# Patient Record
Sex: FEMALE | Race: WHITE | Employment: OTHER | URBAN - METROPOLITAN AREA
[De-identification: names, ages, dates, MRNs, and addresses within clinical notes are randomized per-mention and may not be internally consistent; named-entity substitution may affect disease eponyms.]

---

## 2017-01-16 ENCOUNTER — TELEPHONE (OUTPATIENT)
Dept: RHEUMATOLOGY | Facility: CLINIC | Age: 57
End: 2017-01-16

## 2017-01-16 RX ORDER — OMEPRAZOLE 40 MG/1
CAPSULE, DELAYED RELEASE ORAL
Qty: 90 CAPSULE | Refills: 5 | Status: SHIPPED | OUTPATIENT
Start: 2017-01-16 | End: 2017-03-16

## 2017-01-16 NOTE — TELEPHONE ENCOUNTER
Pt states that she has pain in the buttocks area. Per pt the pain medication is not helping her at all. Pt would like to know what doctor recommends for the pain. Pt is requesting a call back in [address] [facility].

## 2017-01-16 NOTE — TELEPHONE ENCOUNTER
Attempted to contact pt via , Salvatore Eisenberg #[id number], not available at first number (W). Unable to leave message at home number. FYI-please see below.

## 2017-01-17 NOTE — TELEPHONE ENCOUNTER
Attempted to call pt.  Via  Erasmo Rothman - not available at home number and not able to leave message  Tried again to call work number - no answer

## 2017-02-07 RX ORDER — TRAMADOL HYDROCHLORIDE 50 MG/1
TABLET ORAL
Qty: 60 TABLET | Refills: 0 | OUTPATIENT
Start: 2017-02-07 | End: 2017-03-16

## 2017-02-09 RX ORDER — MELOXICAM 15 MG/1
TABLET ORAL
Qty: 30 TABLET | Refills: 1 | Status: SHIPPED | OUTPATIENT
Start: 2017-02-09 | End: 2017-04-04

## 2017-02-09 NOTE — TELEPHONE ENCOUNTER
Called [facility] pharmacy and left rx approval on voicemail as authorized by Dr. Fabi Villalobos for Tramadol 50mg # 60    TRAMADOL HCL 50 MG Oral Tab 60 tablet 0        Start: 2/7/2017       Sig:  TAKE 1 TABLET BY MOUTH TWICE A DAY AS NEEDED FOR PAIN       Class:  Phon

## 2017-02-10 RX ORDER — TRAMADOL HYDROCHLORIDE 50 MG/1
TABLET ORAL
Qty: 60 TABLET | OUTPATIENT
Start: 2017-02-10

## 2017-02-14 RX ORDER — OMEPRAZOLE 40 MG/1
CAPSULE, DELAYED RELEASE ORAL
Qty: 90 CAPSULE | Refills: 1 | Status: SHIPPED | OUTPATIENT
Start: 2017-02-14 | End: 2018-07-19

## 2017-03-06 ENCOUNTER — TELEPHONE (OUTPATIENT)
Dept: RHEUMATOLOGY | Facility: CLINIC | Age: 57
End: 2017-03-06

## 2017-03-06 NOTE — TELEPHONE ENCOUNTER
Patient states that she has been having pain in her buttock area where she is being treated and the medication that she is currently prescribed is not working for her. She would like to know what dr recommends.  Patient speak Greenlandic    She has an appt fo

## 2017-03-15 ENCOUNTER — TELEPHONE (OUTPATIENT)
Dept: INTERNAL MEDICINE CLINIC | Facility: CLINIC | Age: 57
End: 2017-03-15

## 2017-03-15 NOTE — TELEPHONE ENCOUNTER
Pt requesting a call back to advise if she is due for blood test, Pt states she recalls EG advised her needed to re-do in March, Please Advise.

## 2017-03-15 NOTE — TELEPHONE ENCOUNTER
Pt given appt for tomorrow at 11:20 am with Dr. Isael Trent in [address] [place] [address] for further evaluation.

## 2017-03-16 ENCOUNTER — OFFICE VISIT (OUTPATIENT)
Dept: RHEUMATOLOGY | Facility: CLINIC | Age: 57
End: 2017-03-16

## 2017-03-16 ENCOUNTER — TELEPHONE (OUTPATIENT)
Dept: RHEUMATOLOGY | Facility: CLINIC | Age: 57
End: 2017-03-16

## 2017-03-16 VITALS
HEIGHT: 59 IN | SYSTOLIC BLOOD PRESSURE: 156 MMHG | WEIGHT: 137.63 LBS | HEART RATE: 69 BPM | DIASTOLIC BLOOD PRESSURE: 92 MMHG | BODY MASS INDEX: 27.75 KG/M2 | TEMPERATURE: 98 F

## 2017-03-16 DIAGNOSIS — G89.29 CHRONIC LEFT SHOULDER PAIN: ICD-10-CM

## 2017-03-16 DIAGNOSIS — M53.3 CHRONIC SI JOINT PAIN: ICD-10-CM

## 2017-03-16 DIAGNOSIS — M70.61 TROCHANTERIC BURSITIS OF RIGHT HIP: ICD-10-CM

## 2017-03-16 DIAGNOSIS — M25.512 CHRONIC LEFT SHOULDER PAIN: ICD-10-CM

## 2017-03-16 DIAGNOSIS — M79.7 FIBROMYALGIA: Primary | ICD-10-CM

## 2017-03-16 DIAGNOSIS — M53.3 SACRAL PAIN: ICD-10-CM

## 2017-03-16 DIAGNOSIS — M54.50 CHRONIC MIDLINE LOW BACK PAIN WITHOUT SCIATICA: ICD-10-CM

## 2017-03-16 DIAGNOSIS — G89.29 CHRONIC MIDLINE LOW BACK PAIN WITHOUT SCIATICA: ICD-10-CM

## 2017-03-16 DIAGNOSIS — G89.29 CHRONIC SI JOINT PAIN: ICD-10-CM

## 2017-03-16 PROCEDURE — 20610 DRAIN/INJ JOINT/BURSA W/O US: CPT | Performed by: INTERNAL MEDICINE

## 2017-03-16 PROCEDURE — 99214 OFFICE O/P EST MOD 30 MIN: CPT | Performed by: INTERNAL MEDICINE

## 2017-03-16 RX ORDER — TRAMADOL HYDROCHLORIDE 50 MG/1
50 TABLET ORAL EVERY 8 HOURS PRN
Qty: 90 TABLET | Refills: 3 | Status: SHIPPED | OUTPATIENT
Start: 2017-03-16 | End: 2017-06-02

## 2017-03-16 RX ORDER — TRIAMCINOLONE ACETONIDE 40 MG/ML
40 INJECTION, SUSPENSION INTRA-ARTICULAR; INTRAMUSCULAR
Status: COMPLETED | OUTPATIENT
Start: 2017-03-16 | End: 2017-03-16

## 2017-03-16 NOTE — TELEPHONE ENCOUNTER
Told daughter was difficult to contact pt.  Back - and com alan - pt.'s phone was disconnected and not working for a while   [facility]   Her daughter's number - [phone] - Mandeep Tenorio -

## 2017-03-16 NOTE — PROCEDURES
With damaristent's consent, I injected pt's left shoudler ac joint with 1ml lidocaine 1 % and 1 ml kenalog 40. It was done under sterile technique using iodine and alcohol swabs and ethyl chloride was used as an anaesthetic spray. Pt.  tolerated it well.   With

## 2017-03-16 NOTE — PATIENT INSTRUCTIONS
1. Cont.  meloxicam 15m ga day   2. Physiatry referral   3. Take tramadol  increase traamdotl to 50mg every 8 hours   4. Return to clinic in 2-3 months. 5. Gabapentin 300mg twice a day   6. Rest left shoudler and right hip x 3 days - can uses ice    7.  C

## 2017-03-16 NOTE — PROGRESS NOTES
Verona Borges is a 61year old female who presents for Patient presents with:  Fibromyalgia Syndrome  Hip Pain: bilateral  Back Pain: saccrum/coccyx  .    HPI:     She is a pleasant 61year old who has joint pain 9/10 and is here for evaluation on She wants to talk about medical marijuana. She feels she has been losing muscle on her bottome side and back. 3/16/2017  She has a lto of pain. Her daughter is transltion. She cna't yara down or sit down for a long period of time.  seh feels when sh Does not apply Misc Test blood sugar twice a day.  Disp: 100 each Rfl: 6      Past Medical History   Diagnosis Date   • Hyperlipidemia    • Hypertension    • Depression    • Carpal tunnel syndrome      L & R   • Cyst of finger 2013     R index   • Unspecifi other ROS are negative.      EXAM:   /92 mmHg  Pulse 69  Temp(Src) 97.9 °F (36.6 °C)  Ht 4' 11\" (1.499 m)  Wt 137 lb 9.6 oz (62.415 kg)  BMI 27.78 kg/m2  HEENT: Clear oropharynx, no oral ulcers, EOM intact, clear sclear, PERRLA, pleasant, no acute di (HgA1c) (L)      4.0 - 6.0 % 6.1 (H)     4/17/2013 - emg b/l hand s- mod to marketed right carpal tunnel and mild to moderated left carpal surgery , no cervical radiculopathy -   4/11/2016 - ct abd .pelvis   No actue intrabominal process is identified, ine 500mg bid and felt pain was worse - she doenst' want to take this medications   - will refer to physiatry to see if local inejction s- si ojint or lumbar are appropriate for her   - she would like to try right trochanteir cbursitis injections while she is

## 2017-04-04 RX ORDER — MELOXICAM 15 MG/1
TABLET ORAL
Qty: 30 TABLET | Refills: 1 | Status: SHIPPED | OUTPATIENT
Start: 2017-04-04 | End: 2017-05-27

## 2017-04-04 NOTE — TELEPHONE ENCOUNTER
LOV: 3/16/17  Last Refilled:#30, 1rf 2/9/17    Future Appointments  Date Time Provider Callie Perez   4/10/2017 2:00 PM Thiago Baldwin MD [facility]       Please advise.

## 2017-04-05 ENCOUNTER — TELEPHONE (OUTPATIENT)
Dept: INTERNAL MEDICINE CLINIC | Facility: CLINIC | Age: 57
End: 2017-04-05

## 2017-04-06 NOTE — TELEPHONE ENCOUNTER
The orders will be given to the patient is they call for follow-up after we review the prior labs. The only time that we order prior to the visit is scheduled annual physical. Call the patient and relay the information.  Thanks

## 2017-04-10 ENCOUNTER — TELEPHONE (OUTPATIENT)
Dept: NEUROLOGY | Facility: CLINIC | Age: 57
End: 2017-04-10

## 2017-04-10 ENCOUNTER — OFFICE VISIT (OUTPATIENT)
Dept: NEUROLOGY | Facility: CLINIC | Age: 57
End: 2017-04-10

## 2017-04-10 VITALS
WEIGHT: 136 LBS | DIASTOLIC BLOOD PRESSURE: 86 MMHG | RESPIRATION RATE: 16 BRPM | HEIGHT: 59 IN | HEART RATE: 80 BPM | SYSTOLIC BLOOD PRESSURE: 144 MMHG | BODY MASS INDEX: 27.42 KG/M2

## 2017-04-10 DIAGNOSIS — M46.1 BILATERAL SACROILIITIS (HCC): ICD-10-CM

## 2017-04-10 DIAGNOSIS — M70.72 ISCHIAL BURSITIS, LEFT: ICD-10-CM

## 2017-04-10 DIAGNOSIS — M53.3 COCCYDYNIA: Primary | ICD-10-CM

## 2017-04-10 DIAGNOSIS — M70.71 BURSITIS, ISCHIAL, RIGHT: ICD-10-CM

## 2017-04-10 DIAGNOSIS — M21.70 LEG LENGTH DISCREPANCY: ICD-10-CM

## 2017-04-10 DIAGNOSIS — M95.5 PELVIC OBLIQUITY: ICD-10-CM

## 2017-04-10 DIAGNOSIS — M70.61 GREATER TROCHANTERIC BURSITIS OF RIGHT HIP: ICD-10-CM

## 2017-04-10 PROCEDURE — 99204 OFFICE O/P NEW MOD 45 MIN: CPT | Performed by: PHYSICAL MEDICINE & REHABILITATION

## 2017-04-10 NOTE — PROGRESS NOTES
History of Present Illness:   Patient presents with:  Low Back Pain: New patient, referred by Dr. Le Barclay, reports bilateral buttock pain for three years. Tried Meloxicam, but stopped, due to not helping.  Had x-ray and MRI of sacrum and coccyx at [facility] Pre-diabetes            Past Surgical History    VAGINAL HYSTERECTOMY  2007    CARPAL TUNNEL RELEASE Bilateral 2013    OTHER  2015    Comment Right shoulder arthroscopically assisted rotator-cuff repair    REPAIR ROTATOR CUFF,ACUTE           Fish-Derived P coffee daily    Exercise No     Social History Narrative    The patient does not use an assistive device. .      The patient does live in a home with stairs.            Review of Systems:  Constitutional: negative for fevers  Eyes: negative for drainage  Ear painful. Right tfl is painful more than gr troch. Ilial rotation freda does not create pain. Right sij stiffer than left to compression. Right posterior rotation and posterior shift.    Range of Motion thoracolumbar spine:  decr range of motion to flexion and

## 2017-04-10 NOTE — PROGRESS NOTES
At end of office visit, patient received the routine printed and verbal instructions for steroid injection. Patient was instructed to avoid aspirin, NSAIDs, fish oil, multivit and vit. E for one week before injection.  Also was instructed to have a  t

## 2017-04-10 NOTE — TELEPHONE ENCOUNTER
Medicare Online for insurance coverage of sacrococcygeal joint and coccygeal joint steroid injections under fluoroscopy cpt code 99700. Insurance was verified and procedure  is a covered benefit and does not require authorization. .  Will inform Nursing.

## 2017-04-10 NOTE — PATIENT INSTRUCTIONS
- schedule physical therapy - [address] [phone] ALDAIR  Consider the back injection - tailbone. Stop for 7 days before injection- all aspirin, advil, aleve, fish oil, vitamin E and similar products.  Including any medications combined year.      Jamar Frames up protocol for controlled substances: Written prescriptions  · Written prescriptions must be picked up in office.   · Please allow the office 48-72 hours to fill the prescription as our physicians rotate between multiple offices and procedu

## 2017-04-12 ENCOUNTER — TELEPHONE (OUTPATIENT)
Dept: NEUROLOGY | Facility: CLINIC | Age: 57
End: 2017-04-12

## 2017-04-12 NOTE — TELEPHONE ENCOUNTER
Patient was scheduled for sacrococcygeal joint and coccygeal joint steroid injections under fluoroscopy with MAC on Monday, April 24, 2017. Medications and allergies reviewed with daughter.  Patient's daughter informed that patient will need to hold aspiri

## 2017-04-18 NOTE — TELEPHONE ENCOUNTER
Please advise regarding refill request. Rx pended for review.        Refill Protocol Appointment Criteria  · Appointment scheduled in the past 6 months or in the next 3 months  Recent Visits       Provider Department Primary Dx    4 months ago Vaughan Denver

## 2017-04-20 RX ORDER — ZOLPIDEM TARTRATE 10 MG/1
TABLET ORAL
Qty: 30 TABLET | Refills: 2 | OUTPATIENT
Start: 2017-04-20 | End: 2017-06-02

## 2017-04-24 ENCOUNTER — OFFICE VISIT (OUTPATIENT)
Dept: SURGERY | Facility: CLINIC | Age: 57
End: 2017-04-24

## 2017-04-24 DIAGNOSIS — M53.3 COCCYDYNIA: Primary | ICD-10-CM

## 2017-04-24 PROCEDURE — 77002 NEEDLE LOCALIZATION BY XRAY: CPT | Performed by: PHYSICAL MEDICINE & REHABILITATION

## 2017-04-24 PROCEDURE — 20600 DRAIN/INJ JOINT/BURSA W/O US: CPT | Performed by: PHYSICAL MEDICINE & REHABILITATION

## 2017-04-24 NOTE — PROCEDURES
[facility]      [facility]   [id number]       DATE OF BIRTH:  1/25/1960  PROCEDURE DATE:  4/24/2017  OPERATING PHYSICIAN:  Gavin Bundy MD      OPERATIVE REPORT    PREOPERATIV coccygeal joint. Omnipaque-240 contrast was used to obtain an  arthrogram.  Radiographic interpretation was that the needle was in the proper position for the injection. No blood, fluid, or air was aspirated.   The joint was injected with a 0.5 ml of the

## 2017-04-24 NOTE — PROGRESS NOTES
Sacrococcygeal and coccygeal joint steroid injection under fluoro. On further review of imaging, distal sacrum may have additional disc or coccygeal joints are enlarged. Distal 2 joints injected.  If needed, will do proximal joint/disc. -mk

## 2017-04-25 ENCOUNTER — TELEPHONE (OUTPATIENT)
Dept: NEUROLOGY | Facility: CLINIC | Age: 57
End: 2017-04-25

## 2017-04-25 NOTE — TELEPHONE ENCOUNTER
Patient had sacrococcygeal joint and coccygeal joint steroid injections on 4/24/17. Spoke to patient's daughter who states that patient still has pain in tailbone and feels the pain is unchanged from before. She states that the pain is 9/10.  Patient wanted

## 2017-04-26 NOTE — TELEPHONE ENCOUNTER
Ok. On fu Will need to check that she is not having pain midline and complaining of medial glute pain still.  Informed preprocedure injection is not meant for the whole area of her glutes and tailbone. -mk

## 2017-04-27 ENCOUNTER — OFFICE VISIT (OUTPATIENT)
Dept: PHYSICAL THERAPY | Facility: HOSPITAL | Age: 57
End: 2017-04-27
Attending: PHYSICAL MEDICINE & REHABILITATION
Payer: MEDICARE

## 2017-04-27 DIAGNOSIS — M70.71 BURSITIS, ISCHIAL, RIGHT: ICD-10-CM

## 2017-04-27 DIAGNOSIS — M46.1 BILATERAL SACROILIITIS (HCC): ICD-10-CM

## 2017-04-27 DIAGNOSIS — M70.72 ISCHIAL BURSITIS, LEFT: ICD-10-CM

## 2017-04-27 DIAGNOSIS — M95.5 PELVIC OBLIQUITY: ICD-10-CM

## 2017-04-27 DIAGNOSIS — M21.70 LEG LENGTH DISCREPANCY: ICD-10-CM

## 2017-04-27 DIAGNOSIS — M70.61 GREATER TROCHANTERIC BURSITIS OF RIGHT HIP: Primary | ICD-10-CM

## 2017-04-27 DIAGNOSIS — M53.3 COCCYDYNIA: ICD-10-CM

## 2017-04-27 PROCEDURE — 97110 THERAPEUTIC EXERCISES: CPT

## 2017-04-27 PROCEDURE — 97162 PT EVAL MOD COMPLEX 30 MIN: CPT

## 2017-04-27 NOTE — PROGRESS NOTES
PELVIC FLOOR EVALUATION:   Referring Physician: Dr. Becca Chaudhary  Diagnosis: Greater trochanteric bursitis of right hip (M70.61)  Pelvic obliquity (M95.5)  Bilateral sacroiliitis (HCC) (M46.1)  Bursitis, ischial, right (M70.71)  Ischial bursitis, left (M70.72) fracture.   FOTO outcome score: 62% impaired          ASSESSMENT:    Cristin is a pleasant Cambodian speaking 61year old female requiring language line for interpretation with a diagnosis of Coccydynia (M53.3), Greater trochanteric bursitis o re-education, manual joint mobilization, self care, education, HEP and ultrasound    Reccommended frequency/duration: 1-2x/week X 10 VISITS: by 7/30/17    Patient/Family Goal: \"to be able to sit down again\"     Long Term Goals (Time Frame 10 visits) by 0 deliveries: none  Menarche: 10  Last menstrual period: Natural menopause - Hysterectomy 2007    URINARY HABITS  Types of symptoms: stress incontinence  Events associated with the onset of urinary complaints: none  Vaginal Pressure complaints: no  Urinary F 4+/5    Internal Rotation 4/5 4/5    Knee:      Flexion 5/5 5/5    Extension 5/5 5/5    Ankle:      Dorsiflexion 5/5 5/5    Abdominals: 3+/5       FLEXIBILITY/PALPATION    Muscle Left Right Comments   Illiopsoas Mild restriction Mild restriction    [place] questions, please contact me at Dept: [phone]    Sincerely,  Electronically signed by:    Therapist: Parminder Newman, PT, DPT, OCS  4/27/2017 5:30 PM      Physician's certification required: Yes  I certify the need for these services furnished under th

## 2017-05-01 RX ORDER — SIMVASTATIN 20 MG
TABLET ORAL
Qty: 90 TABLET | Refills: 1 | Status: SHIPPED | OUTPATIENT
Start: 2017-05-01 | End: 2017-10-15

## 2017-05-04 ENCOUNTER — OFFICE VISIT (OUTPATIENT)
Dept: PHYSICAL THERAPY | Facility: HOSPITAL | Age: 57
End: 2017-05-04
Attending: PHYSICAL MEDICINE & REHABILITATION
Payer: MEDICARE

## 2017-05-04 DIAGNOSIS — M70.72 ISCHIAL BURSITIS, LEFT: ICD-10-CM

## 2017-05-04 DIAGNOSIS — M70.61 GREATER TROCHANTERIC BURSITIS OF RIGHT HIP: Primary | ICD-10-CM

## 2017-05-04 DIAGNOSIS — M70.71 BURSITIS, ISCHIAL, RIGHT: ICD-10-CM

## 2017-05-04 DIAGNOSIS — M21.70 LEG LENGTH DISCREPANCY: ICD-10-CM

## 2017-05-04 DIAGNOSIS — M53.3 COCCYDYNIA: ICD-10-CM

## 2017-05-04 DIAGNOSIS — M95.5 PELVIC OBLIQUITY: ICD-10-CM

## 2017-05-04 DIAGNOSIS — M46.1 BILATERAL SACROILIITIS (HCC): ICD-10-CM

## 2017-05-04 PROCEDURE — 97140 MANUAL THERAPY 1/> REGIONS: CPT

## 2017-05-04 NOTE — PROGRESS NOTES
Goals     • Therapy Goals       Long Term Goals (Time Frame 10 visits) by 07/30/17   1. Pt to report reduction of buttock pain with sitting >20 minutes to no more than 2-3/10 for functional pain management   2.  Pt to demonstrate improved strength in all mu language line interpretation used and will perform at next visit to assess coccydynia and pt complaint of stress urinary incontinence.  The patient would benefit from the skilled intervention of a physical therapist for the impairments and functional limita after treatment, although reports feeling slightly better to a 7/10 pain now post-tx. Instructed pt to start performing self massage to the trigger point and verbalized understanding.   Plan to continue performing soft tissue mobilization as needed and yazan

## 2017-05-22 ENCOUNTER — APPOINTMENT (OUTPATIENT)
Dept: PHYSICAL THERAPY | Facility: HOSPITAL | Age: 57
End: 2017-05-22
Attending: PHYSICAL MEDICINE & REHABILITATION
Payer: MEDICARE

## 2017-05-22 PROCEDURE — 97110 THERAPEUTIC EXERCISES: CPT

## 2017-05-22 PROCEDURE — 97140 MANUAL THERAPY 1/> REGIONS: CPT

## 2017-05-22 NOTE — PROGRESS NOTES
Goals     • Therapy Goals       Long Term Goals (Time Frame 10 visits) by 07/30/17   1. Pt to report reduction of buttock pain with sitting >20 minutes to no more than 2-3/10 for functional pain management   2.  Pt to demonstrate improved strength in all mu language line interpretation used and will perform at next visit to assess coccydynia and pt complaint of stress urinary incontinence.  The patient would benefit from the skilled intervention of a physical therapist for the impairments and functional limita walking.  Continues to demonstrate moderate to severe restrictions and trigger points present in the R>L piriformis, gluteals, hamstrings and quadratus lumborum which is addressed with soft tissue mobilization including effleurage and pétrissage, myofascial distraction. Plan to continue performing soft tissue mobilization as needed and plan to progress lumbo-pelvic strengthening as able and plan to perform internal treatment as needed.      Therapist: Symone Perdue PT, DPT, OCS  5/11/2017 5:36 PM    5/22/

## 2017-05-30 NOTE — TELEPHONE ENCOUNTER
LOV:3-16  Last Filled:4-4, #30 with 1 refill  Labs:6-1, WNL  Future Appointments  Date Time Provider Callie Perez   5/31/2017 4:45 PM Fahad Gonzalez PT [facility]   6/2/2017 4:05 PM Elva Thapa MD [facility]   6/6/2017 4:00 PM Lauryn Torres,

## 2017-05-31 ENCOUNTER — APPOINTMENT (OUTPATIENT)
Dept: PHYSICAL THERAPY | Facility: HOSPITAL | Age: 57
End: 2017-05-31
Attending: PHYSICAL MEDICINE & REHABILITATION
Payer: MEDICARE

## 2017-05-31 RX ORDER — MELOXICAM 15 MG/1
TABLET ORAL
Qty: 30 TABLET | Refills: 1 | Status: SHIPPED | OUTPATIENT
Start: 2017-05-31 | End: 2017-08-08

## 2017-06-02 NOTE — PROGRESS NOTES
HPI:    Patient ID: Nehal Pierce is a 61year old female. Low Back Pain  This is a chronic problem. The current episode started more than 1 month ago. Pain location: coccyx. Pertinent negatives include no chest pain or fever.  Treatments tried • Hyperlipidemia    • Hypertension    • Depression    • Carpal tunnel syndrome      L & R   • Cyst of finger 2013     R index   • Unspecified essential hypertension    • Other and unspecified hyperlipidemia    • Rotator cuff disorder      right shoulder HCl (ZOLOFT) 50 MG Oral Tab Take 1 tablet (50 mg total) by mouth daily.  Disp: 30 tablet Rfl: 2     Allergies:  Fish-Derived Produc*    Unknown  Pollen                    Seasonal                   PHYSICAL EXAM:   Physical Exam   Constitutional: She appear diagnosis)  - Patient is currently taking zolpidem tartrate 10 MG with significant improvement and was instructed to continue medication as prescribed. Pt no longer taking sertraline HCl 50 MG.   - Received a refill for zolpidem tartrate 10 MG.    - Chadwick Sandhu Yandy Albrecht MD.   Electronically Signed: Chip Will, 6/2/2017, 3:55 PM.      I, Nam Miller MD,  personally performed the services described in this documentation. All medical record entries made by the scribe were at my direction and in my presence.   I

## 2017-06-06 ENCOUNTER — OFFICE VISIT (OUTPATIENT)
Dept: PHYSICAL THERAPY | Facility: HOSPITAL | Age: 57
End: 2017-06-06
Attending: PHYSICAL MEDICINE & REHABILITATION
Payer: MEDICARE

## 2017-06-06 PROCEDURE — 97140 MANUAL THERAPY 1/> REGIONS: CPT

## 2017-06-06 NOTE — PROGRESS NOTES
Goals     • Therapy Goals       Long Term Goals (Time Frame 10 visits) by 07/30/17   1. Pt to report reduction of buttock pain with sitting >20 minutes to no more than 2-3/10 for functional pain management   2.  Pt to demonstrate improved strength in all mu language line interpretation used and will perform at next visit to assess coccydynia and pt complaint of stress urinary incontinence.  The patient would benefit from the skilled intervention of a physical therapist for the impairments and functional limita of R LE radiating symptoms, although still present with prolonged standing and walking.  Continues to demonstrate moderate to severe restrictions and trigger points present in the R>L piriformis, gluteals, hamstrings and quadratus lumborum which is [employer] reports significant reduction in radiating symptoms with bilateral and single leg distraction.    Plan to continue performing soft tissue mobilization as needed and plan to progress lumbo-pelvic strengthening as able and plan to perform internal treatment a mottled  Labia minora: mottled  Urethral meatus: mottled  Introitus: mottled  Perineal body: WNL  Pelvic clock: Non-tender to all external except pain at R/L levator ani    Internal Palpation Left Right Comments   Superficial Transverse perineal minimal re

## 2017-06-07 ENCOUNTER — OFFICE VISIT (OUTPATIENT)
Dept: NEUROLOGY | Facility: CLINIC | Age: 57
End: 2017-06-07

## 2017-06-07 VITALS
HEIGHT: 59 IN | DIASTOLIC BLOOD PRESSURE: 70 MMHG | HEART RATE: 68 BPM | WEIGHT: 133 LBS | BODY MASS INDEX: 26.81 KG/M2 | SYSTOLIC BLOOD PRESSURE: 120 MMHG | RESPIRATION RATE: 14 BRPM

## 2017-06-07 DIAGNOSIS — M53.3 COCCYDYNIA: Primary | ICD-10-CM

## 2017-06-07 DIAGNOSIS — M54.42 CHRONIC BILATERAL LOW BACK PAIN WITH LEFT-SIDED SCIATICA: ICD-10-CM

## 2017-06-07 DIAGNOSIS — R10.2 PELVIC PAIN: ICD-10-CM

## 2017-06-07 DIAGNOSIS — G89.29 CHRONIC BILATERAL LOW BACK PAIN WITH LEFT-SIDED SCIATICA: ICD-10-CM

## 2017-06-07 PROCEDURE — 99215 OFFICE O/P EST HI 40 MIN: CPT | Performed by: PHYSICAL MEDICINE & REHABILITATION

## 2017-06-07 RX ORDER — BACLOFEN 10 MG/1
10 TABLET ORAL 3 TIMES DAILY PRN
Qty: 90 TABLET | Refills: 0 | Status: SHIPPED | OUTPATIENT
Start: 2017-06-07 | End: 2017-07-17

## 2017-06-07 NOTE — PROGRESS NOTES
Patient speaks [place] and [address] Answers all questions in [place] but [facility] Backbone also used as  for initial assessment. Language line used as  during exam and after exam. ID# [id number] Joseph.

## 2017-06-07 NOTE — PATIENT INSTRUCTIONS
- get the mri of your low back  - get the mri of your lower pelvis with and without contrast  - try 2 pills of tramadol at a time for three times a day as needed.  Maximum is 6 pills a day  - try the baclofen 10mg 1 pill three times a day as needed - new mu between multiple offices and procedure days in the hospitals. · Patient must present photo ID at time of .  If a designated family member will be picking up prescription, office must be given name of individual in advance and they must present an ID

## 2017-06-08 ENCOUNTER — TELEPHONE (OUTPATIENT)
Dept: NEUROLOGY | Facility: CLINIC | Age: 57
End: 2017-06-08

## 2017-06-08 ENCOUNTER — OFFICE VISIT (OUTPATIENT)
Dept: PHYSICAL THERAPY | Facility: HOSPITAL | Age: 57
End: 2017-06-08
Attending: PHYSICAL MEDICINE & REHABILITATION
Payer: MEDICARE

## 2017-06-08 PROCEDURE — 97112 NEUROMUSCULAR REEDUCATION: CPT

## 2017-06-08 PROCEDURE — 97110 THERAPEUTIC EXERCISES: CPT

## 2017-06-08 NOTE — PROGRESS NOTES
Goals     • Therapy Goals       Long Term Goals (Time Frame 10 visits) by 07/30/17   1. Pt to report reduction of buttock pain with sitting >20 minutes to no more than 2-3/10 for functional pain management   2.  Pt to demonstrate improved strength in all mu language line interpretation used and will perform at next visit to assess coccydynia and pt complaint of stress urinary incontinence.  The patient would benefit from the skilled intervention of a physical therapist for the impairments and functional limita cues:  Sidelyin. 2x10 c 5\" hold  2. 2x10 c 5x quick   1. PFM Endurance  2.  PFM quick coordination     Self-Care                      2017: Pt returns to clinic with 8-/10 tailbone and R buttock pain currently and reports the HEP stretches are go increase lymphatic flow, increase circulation and increase waste removal of inflammation.  Demonstrates slight R pelvic crest, ASIS, and PSIS elevated in standing and supine - addressed with single leg distraction with internal rotation of the femur to dist weakness for pain management and await for guidance from Dr. Rach Verduzco for further treatment options if necessary.      PELVIC EXAMINATION - Both vaginal and rectal examination performed  Verbal consent given internal examination: yes    External Observation  Mon tramadol to be used 3 times daily with new baclofen 10mg 3 times daily for muscle relaxant to determine pain relief. Performed exercises in sidelying position due to significantly increased pain in supine or seated position due to coccyx pressure.   Reports

## 2017-06-08 NOTE — TELEPHONE ENCOUNTER
Pt. informed insurance was verified and MRI pelvis w/wo is a covered benefit and does not require authorization. Can proceed with scheduling appt. Scheduled MRI on 06/15/17 at [address] [place] [address].

## 2017-06-12 RX ORDER — LOSARTAN POTASSIUM 50 MG/1
TABLET ORAL
Qty: 90 TABLET | Refills: 1 | Status: SHIPPED | OUTPATIENT
Start: 2017-06-12 | End: 2017-12-04

## 2017-06-15 ENCOUNTER — HOSPITAL ENCOUNTER (OUTPATIENT)
Dept: MRI IMAGING | Age: 57
Discharge: HOME | End: 2017-06-15
Attending: PHYSICAL MEDICINE & REHABILITATION
Payer: MEDICARE

## 2017-06-15 DIAGNOSIS — M53.3 COCCYDYNIA: ICD-10-CM

## 2017-06-15 DIAGNOSIS — R10.2 PELVIC PAIN: ICD-10-CM

## 2017-06-15 PROCEDURE — 72197 MRI PELVIS W/O & W/DYE: CPT | Performed by: PHYSICAL MEDICINE & REHABILITATION

## 2017-06-15 PROCEDURE — A9575 INJ GADOTERATE MEGLUMI 0.1ML: HCPCS | Performed by: PHYSICAL MEDICINE & REHABILITATION

## 2017-06-15 PROCEDURE — 82565 ASSAY OF CREATININE: CPT

## 2017-06-17 ENCOUNTER — LAB ENCOUNTER (OUTPATIENT)
Dept: LAB | Age: 57
End: 2017-06-17
Attending: INTERNAL MEDICINE
Payer: MEDICARE

## 2017-06-17 DIAGNOSIS — D64.9 ANEMIA, UNSPECIFIED TYPE: ICD-10-CM

## 2017-06-17 DIAGNOSIS — R73.01 IMPAIRED FASTING GLUCOSE: ICD-10-CM

## 2017-06-17 DIAGNOSIS — E78.5 HYPERLIPIDEMIA LDL GOAL <100: ICD-10-CM

## 2017-06-17 PROCEDURE — 36415 COLL VENOUS BLD VENIPUNCTURE: CPT

## 2017-06-17 PROCEDURE — 80053 COMPREHEN METABOLIC PANEL: CPT

## 2017-06-17 PROCEDURE — 85025 COMPLETE CBC W/AUTO DIFF WBC: CPT

## 2017-06-17 PROCEDURE — 83036 HEMOGLOBIN GLYCOSYLATED A1C: CPT

## 2017-06-17 PROCEDURE — 80061 LIPID PANEL: CPT

## 2017-06-19 ENCOUNTER — TELEPHONE (OUTPATIENT)
Dept: PHYSICAL THERAPY | Facility: HOSPITAL | Age: 57
End: 2017-06-19

## 2017-06-19 ENCOUNTER — TELEPHONE (OUTPATIENT)
Dept: INTERNAL MEDICINE CLINIC | Facility: CLINIC | Age: 57
End: 2017-06-19

## 2017-06-19 ENCOUNTER — TELEPHONE (OUTPATIENT)
Dept: NEUROLOGY | Facility: CLINIC | Age: 57
End: 2017-06-19

## 2017-06-19 NOTE — TELEPHONE ENCOUNTER
I called to give patient the MRI findings, and recommendation for follow up office visit. Patient said that she wants someone to speak Korean. I will forward message to [facility], LPN, in our office to call patient.

## 2017-06-19 NOTE — TELEPHONE ENCOUNTER
Mri does not show anything structurally abnormal that would cause her pain. She has some diverticulae in her colon but those are not inflamed. She can still have pain from her muscles and joints.  She needs fu appt-mk

## 2017-06-19 NOTE — TELEPHONE ENCOUNTER
Spoke to Beatrice Church and notified her that per Dr. Jose Champion, patient advised to complete one more PT session for HEP. Beatrice Church states that patient has a good HEP at this point and will hold her chart until after patient follow-up with Dr. Jose Champion.

## 2017-06-19 NOTE — TELEPHONE ENCOUNTER
Received call from Rudi Hawkins in [address] [place] [address]. She would like to know if she should continue PT with patient or if they should hold PT until after follow-up. Wang advise. Ext: [phone].

## 2017-06-19 NOTE — TELEPHONE ENCOUNTER
Spoke to "[employer]" RN for Dr. Yarelis Goel whom confirmed from Dr. Yarelis Goel to hold on therapy until she sees [place] for follow up on MRI results and to determine continuity of therapy services.   Roman Daniels was able to confirm the pt understood and will only perform H

## 2017-06-20 ENCOUNTER — TELEPHONE (OUTPATIENT)
Dept: NEUROLOGY | Facility: CLINIC | Age: 57
End: 2017-06-20

## 2017-06-20 NOTE — PROGRESS NOTES
History of Present Illness:   Patient presents with:  Pain: LOV 4/24/17. C/o pain to coccyx,buttocks greater on left side. Pain for last 3 years, worse since Jan 2017. Taking tramadol with very little relief. Pain is 9/10 today with tramadol.    Patient ret TraMADol HCl 50 MG Oral Tab Take 1 tablet (50 mg total) by mouth every 8 (eight) hours as needed for Pain. Disp: 90 tablet Rfl: 3   Zolpidem Tartrate 10 MG Oral Tab Take 1 tablet (10 mg total) by mouth nightly as needed for Sleep.  At Bedtime Disp: 30 tab Exam:  Vital Signs:  /70 mmHg  Pulse 68  Resp 14  Ht 59\"  Wt 133 lb  BMI 26.85 kg/m2    General: Alert, oriented x3. Cooperative. No apparent distress. HEENT:  No discharge freda eyes, nares, ears. .   Lungs: no acute respiratory distress.   Pelvic a tailbone area and she wants to know if she can get another injection. 2. Mri pelvis with and without contrast.  History of hysterectomy. Pelvic floor and area of tenderness is not visible on mri sacrum.   3. Showed mri sacrum to patient and where the imag

## 2017-06-20 NOTE — TELEPHONE ENCOUNTER
Recent blood tests done was normal for the CBC. Excellent results for cholesterol panel with HDL 72, triglycerides 87, LDL 79. Triglycerides 87. Comprehensive metabolic panel was normal except for glucose elevated at 120.   If the patient was fasting for

## 2017-06-20 NOTE — TELEPHONE ENCOUNTER
Pt. informed insurance was verified and MRI L-spine wo is a covered benefit and does not require authorization. Pt. Had procedure today.

## 2017-06-21 ENCOUNTER — TELEPHONE (OUTPATIENT)
Dept: NEUROLOGY | Facility: CLINIC | Age: 57
End: 2017-06-21

## 2017-06-21 NOTE — TELEPHONE ENCOUNTER
Dr. Rach Verduzco has ordered MRI of lumbar spine for patient to have completed. Called patient. She requested that someone Greek speaking call her. Will try to have someone Greek speaking call patient.

## 2017-06-21 NOTE — TELEPHONE ENCOUNTER
Pt called back - per Ocie Day was just to inform pt Dr Bob Smith ordered MRI LSpine ok to sched - PSR informed pt new MRI was ordered and ok to sched per auth notes - scheduling # given to pt

## 2017-06-24 ENCOUNTER — HOSPITAL ENCOUNTER (OUTPATIENT)
Dept: MRI IMAGING | Facility: HOSPITAL | Age: 57
Discharge: HOME | End: 2017-06-24
Attending: PHYSICAL MEDICINE & REHABILITATION
Payer: MEDICARE

## 2017-06-24 DIAGNOSIS — M54.42 CHRONIC BILATERAL LOW BACK PAIN WITH LEFT-SIDED SCIATICA: ICD-10-CM

## 2017-06-24 DIAGNOSIS — G89.29 CHRONIC BILATERAL LOW BACK PAIN WITH LEFT-SIDED SCIATICA: ICD-10-CM

## 2017-06-24 PROCEDURE — 72148 MRI LUMBAR SPINE W/O DYE: CPT | Performed by: PHYSICAL MEDICINE & REHABILITATION

## 2017-06-28 ENCOUNTER — APPOINTMENT (OUTPATIENT)
Dept: PHYSICAL THERAPY | Facility: HOSPITAL | Age: 57
End: 2017-06-28
Attending: PHYSICAL MEDICINE & REHABILITATION
Payer: MEDICARE

## 2017-06-28 ENCOUNTER — TELEPHONE (OUTPATIENT)
Dept: NEUROLOGY | Facility: CLINIC | Age: 57
End: 2017-06-28

## 2017-06-28 NOTE — TELEPHONE ENCOUNTER
Spoke to patient and advised her to schedule appointment to see Dr. Yessica Garcia to review results. She was understanding. Transferred to the front to schedule appointment.

## 2017-06-28 NOTE — TELEPHONE ENCOUNTER
----- Message from Alejandrina Castrejon MD sent at 6/27/2017  6:22 PM CDT -----  Viewed pls call -   Mri low back shows a few disc bulges with arthritis. She has diverticulosis on part of the colon that was in the mri.  She has one bone that has gotten smaller du

## 2017-07-03 ENCOUNTER — APPOINTMENT (OUTPATIENT)
Dept: PHYSICAL THERAPY | Facility: HOSPITAL | Age: 57
End: 2017-07-03
Attending: PHYSICAL MEDICINE & REHABILITATION
Payer: MEDICARE

## 2017-07-05 ENCOUNTER — TELEPHONE (OUTPATIENT)
Dept: INTERNAL MEDICINE CLINIC | Facility: CLINIC | Age: 57
End: 2017-07-05

## 2017-07-05 DIAGNOSIS — G89.29 CHRONIC LOW BACK PAIN WITH SCIATICA, SCIATICA LATERALITY UNSPECIFIED, UNSPECIFIED BACK PAIN LATERALITY: Primary | ICD-10-CM

## 2017-07-05 DIAGNOSIS — M54.40 CHRONIC LOW BACK PAIN WITH SCIATICA, SCIATICA LATERALITY UNSPECIFIED, UNSPECIFIED BACK PAIN LATERALITY: Primary | ICD-10-CM

## 2017-07-05 NOTE — TELEPHONE ENCOUNTER
I generated a referral for Pain management. Please provide the phone number to the patient and let her know that the referral has been completed. Thanks.

## 2017-07-05 NOTE — TELEPHONE ENCOUNTER
Actions Requested:  Patient states she has severe back pain 9/10, and would like to see Dr. Fabi Villalobos for pain management.   Appointment given for 7/5/2017 at 1:50 pm.  Problem:   Severe back pain  Onset and Timing:   ongoing  Associated Symptoms:   Patient i Abdominal pain and age > 61  * Unable to urinate (or only a few drops) and bladder feels very full  * Numbness (loss of sensation) in groin or rectal area  * Sudden onset of severe back pain and age > 60  * Pain radiates into groin, scrotum  * Blood in uri

## 2017-07-05 NOTE — TELEPHONE ENCOUNTER
Unable to leave VM at either # listed for pt. Will need to try again at another time. Referred to  [address] [place] [address] for Pain Management [address]  [phone]

## 2017-07-06 ENCOUNTER — OFFICE VISIT (OUTPATIENT)
Dept: INTERNAL MEDICINE CLINIC | Facility: CLINIC | Age: 57
End: 2017-07-06

## 2017-07-06 VITALS
HEART RATE: 67 BPM | SYSTOLIC BLOOD PRESSURE: 138 MMHG | WEIGHT: 132.38 LBS | DIASTOLIC BLOOD PRESSURE: 90 MMHG | TEMPERATURE: 98 F | BODY MASS INDEX: 27 KG/M2

## 2017-07-06 DIAGNOSIS — M51.16 LUMBAR DISC DISEASE WITH RADICULOPATHY: Primary | ICD-10-CM

## 2017-07-06 DIAGNOSIS — M26.622 ARTHRALGIA OF LEFT TEMPOROMANDIBULAR JOINT: ICD-10-CM

## 2017-07-06 PROCEDURE — 99214 OFFICE O/P EST MOD 30 MIN: CPT | Performed by: INTERNAL MEDICINE

## 2017-07-06 PROCEDURE — G0463 HOSPITAL OUTPT CLINIC VISIT: HCPCS | Performed by: INTERNAL MEDICINE

## 2017-07-06 NOTE — TELEPHONE ENCOUNTER
Please try calling the patient again. If unable to reach the patient please send a letter with information to the patient.

## 2017-07-06 NOTE — PROGRESS NOTES
HPI:    Patient ID: Laura Patricia is a 61year old female. Ear Pain    There is pain in the left ear. This is a new problem. The current episode started in the past 7 days. The problem occurs constantly. The problem has been unchanged.  There h LOSARTAN POTASSIUM 50 MG Oral Tab TAKE 1 TABLET (50 MG TOTAL) BY MOUTH ONCE DAILY. Disp: 90 tablet Rfl: 1   baclofen 10 MG Oral Tab Take 1 tablet (10 mg total) by mouth 3 (three) times daily as needed. First dose at night.  Disp: 90 tablet Rfl: 0   TraMADol Pulmonary/Chest: No respiratory distress. Neurological: She is alert. Skin: Skin is dry. Psychiatric: Her behavior is normal.   Nursing note and vitals reviewed.      07/06/17  1823   BP: 138/90   Pulse: 67   Temp: 98.3 °F (36.8 °C)   TempSrc: Oral Shelia French MD,  personally performed the services described in this documentation. All medical record entries made by the scribe were at my direction and in my presence.   I have reviewed the chart and discharge instructions (if applicable) and agree

## 2017-07-06 NOTE — TELEPHONE ENCOUNTER
Spoke to Daughter [facility] - she stated the patient already has an office visit today with Dr Rubina Hernandez at 6:20pm.    Informed daughter that Dr Rubina Hernandez put in order for referral for the pain clinic, she states she will let her mother know.

## 2017-07-08 ENCOUNTER — HOSPITAL ENCOUNTER (EMERGENCY)
Facility: HOSPITAL | Age: 57
Discharge: HOME | End: 2017-07-08
Attending: EMERGENCY MEDICINE
Payer: MEDICARE

## 2017-07-08 VITALS
BODY MASS INDEX: 31.02 KG/M2 | SYSTOLIC BLOOD PRESSURE: 145 MMHG | RESPIRATION RATE: 18 BRPM | WEIGHT: 158 LBS | HEIGHT: 60 IN | DIASTOLIC BLOOD PRESSURE: 78 MMHG | OXYGEN SATURATION: 98 % | HEART RATE: 62 BPM | TEMPERATURE: 97 F

## 2017-07-08 DIAGNOSIS — J01.00 ACUTE NON-RECURRENT MAXILLARY SINUSITIS: ICD-10-CM

## 2017-07-08 DIAGNOSIS — R11.0 NAUSEA: Primary | ICD-10-CM

## 2017-07-08 LAB
ALBUMIN SERPL BCP-MCNC: 4.5 G/DL (ref 3.5–4.8)
ALBUMIN/GLOB SERPL: 1.6 {RATIO} (ref 1–2)
ALP SERPL-CCNC: 78 U/L (ref 32–100)
ALT SERPL-CCNC: 19 U/L (ref 14–54)
ANION GAP SERPL CALC-SCNC: 17 MMOL/L (ref 0–18)
AST SERPL-CCNC: 29 U/L (ref 15–41)
BACTERIA UR QL AUTO: NEGATIVE /HPF
BASOPHILS # BLD: 0.1 K/UL (ref 0–0.2)
BASOPHILS NFR BLD: 1 %
BILIRUB SERPL-MCNC: 1.1 MG/DL (ref 0.3–1.2)
BILIRUB UR QL: NEGATIVE
BUN SERPL-MCNC: 5 MG/DL (ref 8–20)
BUN/CREAT SERPL: 8.3 (ref 10–20)
CALCIUM SERPL-MCNC: 9.5 MG/DL (ref 8.5–10.5)
CHLORIDE SERPL-SCNC: 91 MMOL/L (ref 95–110)
CLARITY UR: CLEAR
CO2 SERPL-SCNC: 19 MMOL/L (ref 22–32)
COLOR UR: YELLOW
CREAT SERPL-MCNC: 0.6 MG/DL (ref 0.5–1.5)
EOSINOPHIL # BLD: 0 K/UL (ref 0–0.7)
EOSINOPHIL NFR BLD: 0 %
ERYTHROCYTE [DISTWIDTH] IN BLOOD BY AUTOMATED COUNT: 13 % (ref 11–15)
GLOBULIN PLAS-MCNC: 2.8 G/DL (ref 2.5–3.7)
GLUCOSE SERPL-MCNC: 137 MG/DL (ref 70–99)
HCT VFR BLD AUTO: 41.6 % (ref 35–48)
HGB BLD-MCNC: 14.3 G/DL (ref 12–16)
KETONES UR-MCNC: 20 MG/DL
LEUKOCYTE ESTERASE UR QL STRIP.AUTO: NEGATIVE
LYMPHOCYTES # BLD: 1.7 K/UL (ref 1–4)
LYMPHOCYTES NFR BLD: 23 %
MCH RBC QN AUTO: 30.6 PG (ref 27–32)
MCHC RBC AUTO-ENTMCNC: 34.4 G/DL (ref 32–37)
MCV RBC AUTO: 88.9 FL (ref 80–100)
MONOCYTES # BLD: 0.4 K/UL (ref 0–1)
MONOCYTES NFR BLD: 6 %
NEUTROPHILS # BLD AUTO: 5.2 K/UL (ref 1.8–7.7)
NEUTROPHILS NFR BLD: 70 %
NITRITE UR QL STRIP.AUTO: NEGATIVE
OSMOLALITY UR CALC.SUM OF ELEC: 263 MOSM/KG (ref 275–295)
PH UR: 8 [PH] (ref 5–8)
PLATELET # BLD AUTO: 307 K/UL (ref 140–400)
PMV BLD AUTO: 8.6 FL (ref 7.4–10.3)
POTASSIUM SERPL-SCNC: 3.5 MMOL/L (ref 3.3–5.1)
PROT SERPL-MCNC: 7.3 G/DL (ref 5.9–8.4)
PROT UR-MCNC: NEGATIVE MG/DL
RBC # BLD AUTO: 4.68 M/UL (ref 3.7–5.4)
RBC #/AREA URNS AUTO: 1 /HPF
SODIUM SERPL-SCNC: 127 MMOL/L (ref 136–144)
SP GR UR STRIP: 1.01 (ref 1–1.03)
UROBILINOGEN UR STRIP-ACNC: <2
VIT C UR-MCNC: NEGATIVE MG/DL
WBC # BLD AUTO: 7.4 K/UL (ref 4–11)
WBC #/AREA URNS AUTO: <1 /HPF

## 2017-07-08 PROCEDURE — 99284 EMERGENCY DEPT VISIT MOD MDM: CPT

## 2017-07-08 PROCEDURE — 81001 URINALYSIS AUTO W/SCOPE: CPT | Performed by: EMERGENCY MEDICINE

## 2017-07-08 PROCEDURE — 85025 COMPLETE CBC W/AUTO DIFF WBC: CPT | Performed by: EMERGENCY MEDICINE

## 2017-07-08 PROCEDURE — 96374 THER/PROPH/DIAG INJ IV PUSH: CPT

## 2017-07-08 PROCEDURE — 80053 COMPREHEN METABOLIC PANEL: CPT | Performed by: EMERGENCY MEDICINE

## 2017-07-08 PROCEDURE — 96375 TX/PRO/DX INJ NEW DRUG ADDON: CPT

## 2017-07-08 PROCEDURE — 96361 HYDRATE IV INFUSION ADD-ON: CPT

## 2017-07-08 RX ORDER — ONDANSETRON 2 MG/ML
4 INJECTION INTRAMUSCULAR; INTRAVENOUS ONCE
Status: COMPLETED | OUTPATIENT
Start: 2017-07-08 | End: 2017-07-08

## 2017-07-08 RX ORDER — AMOXICILLIN 875 MG/1
875 TABLET, COATED ORAL 2 TIMES DAILY
Qty: 14 TABLET | Refills: 0 | Status: SHIPPED | OUTPATIENT
Start: 2017-07-08 | End: 2017-07-15

## 2017-07-08 RX ORDER — ONDANSETRON 4 MG/1
4 TABLET, ORALLY DISINTEGRATING ORAL EVERY 4 HOURS PRN
Qty: 15 TABLET | Refills: 0 | Status: SHIPPED | OUTPATIENT
Start: 2017-07-08 | End: 2017-10-12 | Stop reason: ALTCHOICE

## 2017-07-08 RX ORDER — ONDANSETRON 2 MG/ML
INJECTION INTRAMUSCULAR; INTRAVENOUS
Status: COMPLETED
Start: 2017-07-08 | End: 2017-07-08

## 2017-07-08 RX ORDER — KETOROLAC TROMETHAMINE 30 MG/ML
30 INJECTION, SOLUTION INTRAMUSCULAR; INTRAVENOUS ONCE
Status: COMPLETED | OUTPATIENT
Start: 2017-07-08 | End: 2017-07-08

## 2017-07-09 NOTE — ED INITIAL ASSESSMENT (HPI)
Pt cam in for N/V and diffuse abdominal pain since this morning. Reports chills and fatigue. Denies CP/SOB, fever, cough, diarrhea and urinary symptoms. RR even and nonlabored, speaking in full sentences.

## 2017-07-14 NOTE — TELEPHONE ENCOUNTER
Unable to locate fax, call placed to [facility] to see which medication asking for refill. Message left.

## 2017-07-17 RX ORDER — BACLOFEN 10 MG/1
10 TABLET ORAL 3 TIMES DAILY PRN
Qty: 90 TABLET | Refills: 0 | Status: SHIPPED | OUTPATIENT
Start: 2017-07-17 | End: 2017-08-13

## 2017-07-17 NOTE — TELEPHONE ENCOUNTER
Spoke to pharmacy. Patient needs refill on baclofen 10 mg.      Refill request for baclofen 10 mg, take 1 tab TID PRN, #90, no refills    LOV: 6/7/17  NOV: 8/8/17  Last refilled on 6/7/17

## 2017-08-08 ENCOUNTER — OFFICE VISIT (OUTPATIENT)
Dept: NEUROLOGY | Facility: CLINIC | Age: 57
End: 2017-08-08

## 2017-08-08 ENCOUNTER — TELEPHONE (OUTPATIENT)
Dept: NEUROLOGY | Facility: CLINIC | Age: 57
End: 2017-08-08

## 2017-08-08 VITALS
RESPIRATION RATE: 16 BRPM | HEIGHT: 59 IN | WEIGHT: 131 LBS | BODY MASS INDEX: 26.41 KG/M2 | HEART RATE: 64 BPM | SYSTOLIC BLOOD PRESSURE: 116 MMHG | DIASTOLIC BLOOD PRESSURE: 80 MMHG

## 2017-08-08 DIAGNOSIS — G89.29 CHRONIC MIDLINE THORACIC BACK PAIN: ICD-10-CM

## 2017-08-08 DIAGNOSIS — M54.6 CHRONIC MIDLINE THORACIC BACK PAIN: ICD-10-CM

## 2017-08-08 DIAGNOSIS — M51.26 BULGING LUMBAR DISC: ICD-10-CM

## 2017-08-08 DIAGNOSIS — M79.18 MYOFASCIAL PAIN: ICD-10-CM

## 2017-08-08 DIAGNOSIS — M47.816 LUMBAR FACET ARTHROPATHY: ICD-10-CM

## 2017-08-08 DIAGNOSIS — M53.3 COCCYDYNIA: Primary | ICD-10-CM

## 2017-08-08 PROCEDURE — 99214 OFFICE O/P EST MOD 30 MIN: CPT | Performed by: PHYSICAL MEDICINE & REHABILITATION

## 2017-08-08 NOTE — TELEPHONE ENCOUNTER
Medicare Online for insurance coverage of ganglion  Impar steroid injection cpt code 57051,65831. Insurance was verified and procedure is a covered benefit and does not require authorization. .  Will inform Nursing.

## 2017-08-08 NOTE — PATIENT INSTRUCTIONS
- Consider the tailbone injection. Stop for 7 days before injection- all aspirin, advil, aleve, fish oil, vitamin E and similar products. Including any medications combined with ibuprofen. You need a  to get home even if you are not being sedated. prescriptions  · Written prescriptions must be picked up in office. · Please allow the office 48-72 hours to fill the prescription as our physicians rotate between multiple offices and procedure days in the hospitals.   · Patient must present photo ID at t

## 2017-08-09 NOTE — PROGRESS NOTES
History of Present Illness:   Patient presents with:  Pain: Patient presents today for a follow up on MRI of Lumbar Spine and Pelvis done 06/2017. Pt states she is having constant pain on buttocks and L pelvis. Pt rates pain 8/10.  Pt states she is having s repair  No date: REPAIR ROTATOR CUFF,ACUTE  2007: VAGINAL HYSTERECTOMY      Fish-Derived Produc*    Unknown  Pollen                    Seasonal                      Current Outpatient Prescriptions:  ondansetron 4 MG Oral Tablet Dispersible Take 1 tablet ( incontinence  Integument/breast: negative for rash  Hematologic/lymphatic: negative for bleeding  Musculoskeletal: see hpi  Neurological: negative for tremors  Behavioral/Psych: negative for sleep disturbance  Endocrine: negative for diabetic symptoms  All tailbone or freda gluteal pain. Mri lumbar shows the facet arthropathy and disc bulges. Showed L2 compression fracture and angulation that is chronic related to posture.   Informed she has different areas of pain for different reasons but all have been chroni

## 2017-08-14 RX ORDER — BACLOFEN 10 MG/1
10 TABLET ORAL 3 TIMES DAILY PRN
Qty: 90 TABLET | Refills: 0 | Status: SHIPPED | OUTPATIENT
Start: 2017-08-14 | End: 2017-09-13

## 2017-08-14 NOTE — TELEPHONE ENCOUNTER
Refill request for baclofen 10 mg, TID PRN, #90, no refills    LOV: 8/8/17  NOV :None  Last refilled on 7/17/17

## 2017-10-12 ENCOUNTER — OFFICE VISIT (OUTPATIENT)
Dept: INTERNAL MEDICINE CLINIC | Facility: CLINIC | Age: 57
End: 2017-10-12

## 2017-10-12 VITALS
WEIGHT: 129.81 LBS | HEART RATE: 69 BPM | SYSTOLIC BLOOD PRESSURE: 151 MMHG | TEMPERATURE: 99 F | DIASTOLIC BLOOD PRESSURE: 96 MMHG | BODY MASS INDEX: 26 KG/M2

## 2017-10-12 DIAGNOSIS — M53.3 COCCYX PAIN: Primary | ICD-10-CM

## 2017-10-12 DIAGNOSIS — G47.01 INSOMNIA SECONDARY TO CHRONIC PAIN: ICD-10-CM

## 2017-10-12 DIAGNOSIS — G89.29 INSOMNIA SECONDARY TO CHRONIC PAIN: ICD-10-CM

## 2017-10-12 DIAGNOSIS — Z12.39 BREAST SCREENING: ICD-10-CM

## 2017-10-12 DIAGNOSIS — F41.8 DEPRESSION WITH ANXIETY: ICD-10-CM

## 2017-10-12 DIAGNOSIS — M48.061 SPINAL STENOSIS OF LUMBAR REGION WITHOUT NEUROGENIC CLAUDICATION: ICD-10-CM

## 2017-10-12 PROCEDURE — G0463 HOSPITAL OUTPT CLINIC VISIT: HCPCS | Performed by: INTERNAL MEDICINE

## 2017-10-12 PROCEDURE — 99214 OFFICE O/P EST MOD 30 MIN: CPT | Performed by: INTERNAL MEDICINE

## 2017-10-12 PROCEDURE — 90686 IIV4 VACC NO PRSV 0.5 ML IM: CPT | Performed by: INTERNAL MEDICINE

## 2017-10-12 PROCEDURE — G0008 ADMIN INFLUENZA VIRUS VAC: HCPCS | Performed by: INTERNAL MEDICINE

## 2017-10-12 RX ORDER — HYDROCODONE BITARTRATE AND ACETAMINOPHEN 10; 325 MG/1; MG/1
1 TABLET ORAL 2 TIMES DAILY PRN
Qty: 30 TABLET | Refills: 1 | Status: SHIPPED | OUTPATIENT
Start: 2017-10-12 | End: 2018-06-28

## 2017-10-12 RX ORDER — ZOLPIDEM TARTRATE 10 MG/1
10 TABLET ORAL NIGHTLY PRN
Qty: 30 TABLET | Refills: 3 | Status: SHIPPED | OUTPATIENT
Start: 2017-10-12 | End: 2018-04-23

## 2017-10-12 RX ORDER — TRAMADOL HYDROCHLORIDE 50 MG/1
50 TABLET ORAL EVERY 8 HOURS PRN
Qty: 90 TABLET | Refills: 2 | Status: SHIPPED | OUTPATIENT
Start: 2017-10-12 | End: 2017-12-01

## 2017-10-12 RX ORDER — PAROXETINE HYDROCHLORIDE 20 MG/1
10 TABLET, FILM COATED ORAL EVERY MORNING
Qty: 30 TABLET | Refills: 2 | Status: SHIPPED | OUTPATIENT
Start: 2017-10-12 | End: 2018-04-05

## 2017-10-12 NOTE — PROGRESS NOTES
HPI:    Patient ID: Anh Way is a 61year old female. Back Pain   This is a chronic problem. The current episode started more than 1 year ago (MRI spine lumbar, 6/24/17). The problem occurs constantly. The problem is unchanged.  The pain i tunnel syndrome     L & R   • Cyst of finger 2013    R index   • Depression    • Esophageal reflux    • Hyperlipidemia    • Hypertension    • Obesity    • Other and unspecified hyperlipidemia    • Pre-diabetes    • Rotator cuff disorder     right shoulder sugar twice daily. Disp: 1 kit Rfl: 0   Anastasiya Microlet Lancets Does not apply Misc Test blood sugar twice a day.  Disp: 100 each Rfl: 6     Allergies:  Fish-Derived Produc*    Unknown  Pollen                    Seasonal                   PHYSICAL EXAM:   Ph claudication  Insomnia secondary to chronic pain  Breast screening         (M48.061) Spinal stenosis of lumbar region without neurogenic claudication  Plan  -MRI spine lumbar, 6/24/17, see imaging above  -Patient is currently taking Tramadol HCl 50 mg with HYDROcodone-acetaminophen  MG Oral Tab 30 tablet 1      Sig: Take 1 tablet by mouth 2 (two) times daily as needed for Pain.            Imaging & Referrals:  [facility] SCREENING BILAT (ZJI=[id number])       AO#[id number]  By signing my name below, Stanislaw Clarke,

## 2017-10-17 RX ORDER — SIMVASTATIN 20 MG
TABLET ORAL
Qty: 90 TABLET | Refills: 1 | Status: SHIPPED | OUTPATIENT
Start: 2017-10-17 | End: 2017-12-04

## 2017-10-18 NOTE — TELEPHONE ENCOUNTER
Cholesterol Medications  Protocol Criteria:  · Appointment scheduled in the past 12 months or in the next 3 months  · ALT & LDL on file in the past 12 months  · ALT result < 80  · LDL result <130   Recent Outpatient Visits            5 days ago Coccyx pain

## 2017-11-27 DIAGNOSIS — M53.3 COCCYX PAIN: ICD-10-CM

## 2017-11-27 DIAGNOSIS — M48.061 SPINAL STENOSIS OF LUMBAR REGION WITHOUT NEUROGENIC CLAUDICATION: ICD-10-CM

## 2017-11-27 NOTE — TELEPHONE ENCOUNTER
Mirta from pharmacy is calling to request refill on pts:      Current Outpatient Prescriptions:  TraMADol HCl 50 MG Oral Tab Take 1 tablet (50 mg total) by mouth every 8 (eight) hours as needed for Pain.  Disp: 90 tablet Rfl: 2

## 2017-11-28 RX ORDER — GABAPENTIN 300 MG/1
CAPSULE ORAL
Qty: 450 CAPSULE | Refills: 0 | Status: SHIPPED | OUTPATIENT
Start: 2017-11-28 | End: 2018-02-24

## 2017-11-28 NOTE — TELEPHONE ENCOUNTER
LOV: 3/6/17  No future appointments. Please advise    Summary:  1. Cont.  meloxicam 15m ga day   2. Physiatry referral   3. Take tramadol  increase traamdotl to 50mg every 8 hours   4. Return to clinic in 2-3 months. 5. Gabapentin 300mg twice a day   6.

## 2017-12-01 ENCOUNTER — TELEPHONE (OUTPATIENT)
Dept: NEUROLOGY | Facility: CLINIC | Age: 57
End: 2017-12-01

## 2017-12-01 DIAGNOSIS — M53.3 COCCYX PAIN: ICD-10-CM

## 2017-12-01 DIAGNOSIS — M48.061 SPINAL STENOSIS OF LUMBAR REGION WITHOUT NEUROGENIC CLAUDICATION: ICD-10-CM

## 2017-12-01 RX ORDER — LOSARTAN POTASSIUM 50 MG/1
TABLET ORAL
Qty: 90 TABLET | Refills: 1 | Status: CANCELLED | OUTPATIENT
Start: 2017-12-01

## 2017-12-01 NOTE — TELEPHONE ENCOUNTER
Using language line   Sherwin Cosby [id number]:  Patient stated she needs a refill for Tramadol for her middle back pain and hip pain. Patient is crying on the phone. Stated pain is a sharp 9/10 and goes to her left leg. Instructed if the pain is so sever she needs to proceed to the ED. The patient stated she does not want to go to the ED. Patient did not want the appointment in [address] [place] [address] or [facility] tomorrow. Asking for a [place] appointment with Dr. Alannah Moore. Appointment given for Monday 12/4/17. Patient stated she will take tylenol and ice the area. She will proceed to the ED if pain becomes unbearable. Patient also has calls into Dr. Sarahi cain for refill on Tramadol and  Rheumatology for refill on Gabapentin. Dr. Alannah Moore or on call Dr. Uzma Yap  will you refill some of the Tramadol until seen? Patient new PCP is Dr. Alannah Moore. Last refilled on 10/12/17  #90 2 refills.     Please reply to pool: MARISOL Motley Links

## 2017-12-01 NOTE — TELEPHONE ENCOUNTER
CSS please assist patient to make appt with new PCP as EG was her last physician and then re-route to triage

## 2017-12-01 NOTE — TELEPHONE ENCOUNTER
Pt. called with update. States  told her she can take 2 Tramadols at one time  for pain at last visit; out of med. when she takes 2 Tramadol pain is still 7/10. Requesting refill for increased amount or another med for pain.  States she cannot take [employer]

## 2017-12-02 RX ORDER — TRAMADOL HYDROCHLORIDE 50 MG/1
50 TABLET ORAL EVERY 8 HOURS PRN
Qty: 90 TABLET | Refills: 2 | Status: SHIPPED | OUTPATIENT
Start: 2017-12-02 | End: 2018-03-19

## 2017-12-02 NOTE — TELEPHONE ENCOUNTER
Rx for Tramadol HCL 50 mg faxed to [facility] pharmacy confirmation received.

## 2017-12-04 ENCOUNTER — OFFICE VISIT (OUTPATIENT)
Dept: FAMILY MEDICINE CLINIC | Facility: CLINIC | Age: 57
End: 2017-12-04

## 2017-12-04 VITALS
BODY MASS INDEX: 25.4 KG/M2 | DIASTOLIC BLOOD PRESSURE: 87 MMHG | WEIGHT: 126 LBS | HEART RATE: 82 BPM | SYSTOLIC BLOOD PRESSURE: 134 MMHG | HEIGHT: 59 IN

## 2017-12-04 DIAGNOSIS — M54.50 CHRONIC LOW BACK PAIN WITHOUT SCIATICA, UNSPECIFIED BACK PAIN LATERALITY: ICD-10-CM

## 2017-12-04 DIAGNOSIS — G89.29 CHRONIC LOW BACK PAIN WITHOUT SCIATICA, UNSPECIFIED BACK PAIN LATERALITY: ICD-10-CM

## 2017-12-04 DIAGNOSIS — M79.7 FIBROMYALGIA: Primary | ICD-10-CM

## 2017-12-04 PROCEDURE — 99203 OFFICE O/P NEW LOW 30 MIN: CPT | Performed by: FAMILY MEDICINE

## 2017-12-04 PROCEDURE — G0463 HOSPITAL OUTPT CLINIC VISIT: HCPCS | Performed by: FAMILY MEDICINE

## 2017-12-04 RX ORDER — TRAMADOL HYDROCHLORIDE 50 MG/1
TABLET ORAL
Qty: 90 TABLET | OUTPATIENT
Start: 2017-12-04

## 2017-12-04 RX ORDER — SIMVASTATIN 20 MG
TABLET ORAL
Qty: 90 TABLET | Refills: 1 | Status: SHIPPED | OUTPATIENT
Start: 2017-12-04 | End: 2018-10-01

## 2017-12-04 RX ORDER — LOSARTAN POTASSIUM 50 MG/1
TABLET ORAL
Qty: 90 TABLET | Refills: 4 | Status: SHIPPED | OUTPATIENT
Start: 2017-12-04 | End: 2018-11-27

## 2017-12-04 NOTE — PROGRESS NOTES
Mercedes Johnson is a 61year old female. Patient presents with:  Low Back Pain: follow up     HPI:   Pt is new to me. Been  About 1 year with pain - its getting worse. Does not work - disability. Reports pain in her lower back is not improving. tunnel syndrome     L & R   • Cyst of finger 2013    R index   • Depression    • Esophageal reflux    • Hyperlipidemia    • Hypertension    • Obesity    • Other and unspecified hyperlipidemia    • Pre-diabetes    • Rotator cuff disorder     right shoulder

## 2018-01-02 ENCOUNTER — HOSPITAL ENCOUNTER (OUTPATIENT)
Dept: MAMMOGRAPHY | Age: 58
Discharge: HOME | End: 2018-01-02
Attending: INTERNAL MEDICINE
Payer: MEDICARE

## 2018-01-02 DIAGNOSIS — Z12.39 BREAST SCREENING: ICD-10-CM

## 2018-01-02 PROCEDURE — 77067 SCR MAMMO BI INCL CAD: CPT | Performed by: INTERNAL MEDICINE

## 2018-01-09 ENCOUNTER — TELEPHONE (OUTPATIENT)
Dept: FAMILY MEDICINE CLINIC | Facility: CLINIC | Age: 58
End: 2018-01-09

## 2018-01-09 NOTE — TELEPHONE ENCOUNTER
----- Message from Chares Kehr, MD sent at 1/4/2018  9:18 AM CST -----  Normal mammogram. Plan for repeat in 1 year.

## 2018-01-31 ENCOUNTER — OFFICE VISIT (OUTPATIENT)
Dept: ORTHOPEDICS CLINIC | Facility: CLINIC | Age: 58
End: 2018-01-31

## 2018-01-31 DIAGNOSIS — M79.7 FIBROMYALGIA: Primary | ICD-10-CM

## 2018-01-31 DIAGNOSIS — M54.40 LOW BACK PAIN WITH SCIATICA, SCIATICA LATERALITY UNSPECIFIED, UNSPECIFIED BACK PAIN LATERALITY, UNSPECIFIED CHRONICITY: ICD-10-CM

## 2018-01-31 DIAGNOSIS — M53.3 SI (SACROILIAC) JOINT DYSFUNCTION: ICD-10-CM

## 2018-01-31 PROCEDURE — G0463 HOSPITAL OUTPT CLINIC VISIT: HCPCS | Performed by: ORTHOPAEDIC SURGERY

## 2018-01-31 PROCEDURE — 99213 OFFICE O/P EST LOW 20 MIN: CPT | Performed by: ORTHOPAEDIC SURGERY

## 2018-01-31 NOTE — PROGRESS NOTES
[address], [address] [facility]    Patient: [id number] [facility] Record Number: [id number]  Site: [address] [facility], [address] [address],[address], [address]  Referring Physician:  Meg Brown The patient indicates understanding of these issues and agrees to the plan. I appreciate the chance to take care of your patient.     Respectfully yours,    Jorge Villareal MD                                                                I have carefu Smoking status: Never Smoker                                                                Smokeless tobacco: Never Used                        Alcohol use: No              Drug use:  No              Sexual activity: Yes                     Birth control GENERAL HEALTH: No fevers, chills, recent weight loss or unremitting night pain,  n difficulty sleeping, fatigue  SKIN: No history of skin rashes, n easy bruising, swollen ankles  EYES: No blurred vision, double vision or changes in vision.   HEENT: No swal Lumbar/Sacral Integument: Skin over lumbar sacral spine is intact without rashes,excoriations,lesions or erythema noted    ROM:  Forward Flexion  Pain free    Extension  Pain     Palpation:  See chart below:  Palpation of Lumbar Area Right   (POS or NEG) L

## 2018-02-03 RX ORDER — OMEPRAZOLE 40 MG/1
CAPSULE, DELAYED RELEASE ORAL
Qty: 90 CAPSULE | Refills: 1 | Status: SHIPPED | OUTPATIENT
Start: 2018-02-03 | End: 2018-04-05

## 2018-02-26 RX ORDER — GABAPENTIN 300 MG/1
CAPSULE ORAL
Qty: 450 CAPSULE | Refills: 0 | Status: SHIPPED | OUTPATIENT
Start: 2018-02-26 | End: 2018-11-27

## 2018-02-26 NOTE — TELEPHONE ENCOUNTER
LOV: 3-16-17  Last Refilled:#450, 0rfs 11/28/17    Future Appointments  Date Time Provider Callie Perez   4/4/2018 5:30 PM Stefanie Orlando MD [facility]       Please advise.

## 2018-03-19 DIAGNOSIS — M48.061 SPINAL STENOSIS OF LUMBAR REGION WITHOUT NEUROGENIC CLAUDICATION: ICD-10-CM

## 2018-03-19 DIAGNOSIS — M53.3 COCCYX PAIN: ICD-10-CM

## 2018-03-19 NOTE — TELEPHONE ENCOUNTER
LOV: 12/4/17 Last Rx: 12/2/17    Please advise in regards to refill request. Thank You    Please respond to pool: MARISOL CHAVEZO LPN/CRUZITO

## 2018-03-20 RX ORDER — TRAMADOL HYDROCHLORIDE 50 MG/1
TABLET ORAL
Qty: 90 TABLET | Refills: 2 | Status: SHIPPED | OUTPATIENT
Start: 2018-03-20 | End: 2018-04-05

## 2018-04-05 ENCOUNTER — OFFICE VISIT (OUTPATIENT)
Dept: FAMILY MEDICINE CLINIC | Facility: CLINIC | Age: 58
End: 2018-04-05

## 2018-04-05 VITALS
HEIGHT: 59 IN | DIASTOLIC BLOOD PRESSURE: 94 MMHG | HEART RATE: 75 BPM | BODY MASS INDEX: 25.6 KG/M2 | TEMPERATURE: 98 F | WEIGHT: 127 LBS | SYSTOLIC BLOOD PRESSURE: 142 MMHG

## 2018-04-05 DIAGNOSIS — M48.061 SPINAL STENOSIS OF LUMBAR REGION WITHOUT NEUROGENIC CLAUDICATION: ICD-10-CM

## 2018-04-05 DIAGNOSIS — G89.29 CHRONIC LOW BACK PAIN WITHOUT SCIATICA, UNSPECIFIED BACK PAIN LATERALITY: Primary | ICD-10-CM

## 2018-04-05 DIAGNOSIS — M54.50 CHRONIC LOW BACK PAIN WITHOUT SCIATICA, UNSPECIFIED BACK PAIN LATERALITY: Primary | ICD-10-CM

## 2018-04-05 DIAGNOSIS — F41.8 DEPRESSION WITH ANXIETY: ICD-10-CM

## 2018-04-05 DIAGNOSIS — R21 RASH: ICD-10-CM

## 2018-04-05 PROCEDURE — 99214 OFFICE O/P EST MOD 30 MIN: CPT | Performed by: FAMILY MEDICINE

## 2018-04-05 PROCEDURE — G0463 HOSPITAL OUTPT CLINIC VISIT: HCPCS | Performed by: FAMILY MEDICINE

## 2018-04-05 RX ORDER — TRAMADOL HYDROCHLORIDE 50 MG/1
TABLET ORAL
Qty: 90 TABLET | Refills: 2 | Status: SHIPPED | OUTPATIENT
Start: 2018-04-05 | End: 2018-05-08

## 2018-04-05 RX ORDER — PAROXETINE HYDROCHLORIDE 20 MG/1
TABLET, FILM COATED ORAL
Qty: 30 TABLET | Refills: 2 | Status: SHIPPED | OUTPATIENT
Start: 2018-04-05 | End: 2020-08-03

## 2018-04-05 RX ORDER — HYDROCODONE BITARTRATE AND ACETAMINOPHEN 10; 325 MG/1; MG/1
1 TABLET ORAL EVERY 8 HOURS PRN
Qty: 60 TABLET | Refills: 0 | Status: SHIPPED | OUTPATIENT
Start: 2018-04-05 | End: 2018-05-08 | Stop reason: DRUGHIGH

## 2018-04-05 NOTE — PROGRESS NOTES
4/5/2018  3:18 PM    Debbie Toribio is a 60year old female. Chief complaint(s): Patient presents with:  Back Pain  Derm Problem: right chest area    HPI:     Debbie Catarino primary complaint is regarding as above.      [address] [facility] arthroscopically assisted                rotator-cuff repair  No date: REPAIR ROTATOR CUFF,ACUTE  2007: VAGINAL HYSTERECTOMY   Family History   Problem Relation Age of Onset   • Diabetes Mother    • Diabetes Brother       Social History: Smoking status: Ne CONTOUR MONITOR) W/DEVICE Does not apply Kit Check blood sugar twice daily. Disp: 1 kit Rfl: 0   Anastasiya Microlet Lancets Does not apply Misc Test blood sugar twice a day.  Disp: 100 each Rfl: 6       Allergies:    Fish-Derived Produc*    Unknown  Pollen (primary encounter diagnosis)  Spinal stenosis of lumbar region without neurogenic claudication  Rash    1. Chronic low back pain without sciatica, unspecified back pain laterality  2.  Spinal stenosis of lumbar region without neurogenic claudication    MED

## 2018-04-05 NOTE — TELEPHONE ENCOUNTER
Refill Protocol Appointment Criteria  · Appointment scheduled in the past 6 months or in the next 3 months  Recent Outpatient Visits            2 months ago [address] [place] [address] [facility] Rahel Uribe MD    Of

## 2018-04-18 NOTE — Clinical Note
RM#8  Chief Complaint   Patient presents with    Complete Physical     Results for orders placed or performed in visit on 04/18/18   AMB POC URINALYSIS DIP STICK AUTO W/O MICRO   Result Value Ref Range    Color (UA POC) Yellow     Clarity (UA POC) Clear     Glucose (UA POC) Negative Negative    Bilirubin (UA POC) Negative Negative    Ketones (UA POC) Negative Negative    Specific gravity (UA POC) 1.020 1.001 - 1.035    Blood (UA POC) 1+ Negative    pH (UA POC) 5.0 4.6 - 8.0    Protein (UA POC) Negative Negative    Urobilinogen (UA POC) 0.2 mg/dL 0.2 - 1    Nitrites (UA POC) Negative Negative    Leukocyte esterase (UA POC) Negative Negative       1. Have you been to the ER, urgent care clinic since your last visit? Hospitalized since your last visit? No    2. Have you seen or consulted any other health care providers outside of the [address] [place] [address] since your last visit? Include any pap smears or colon screening.  No  Health Maintenance Due   Topic Date Due    Pneumococcal 19-64 Medium Risk (1 of 1 - PPSV23) 02/09/2007    PAP AKA CERVICAL CYTOLOGY  10/18/2016 Spoke with pt today for TCM--sent TE to office staff for appt f/u--thank you.

## 2018-04-23 ENCOUNTER — TELEPHONE (OUTPATIENT)
Dept: GASTROENTEROLOGY | Facility: CLINIC | Age: 58
End: 2018-04-23

## 2018-04-23 DIAGNOSIS — G47.01 INSOMNIA SECONDARY TO CHRONIC PAIN: ICD-10-CM

## 2018-04-23 DIAGNOSIS — G89.29 INSOMNIA SECONDARY TO CHRONIC PAIN: ICD-10-CM

## 2018-04-23 RX ORDER — ZOLPIDEM TARTRATE 10 MG/1
TABLET ORAL
Qty: 30 TABLET | Refills: 0 | OUTPATIENT
Start: 2018-04-23 | End: 2018-04-24

## 2018-04-23 NOTE — TELEPHONE ENCOUNTER
----- Message from Neymar Edwards RN sent at 7/28/2015  3:45 PM CDT -----  Regarding: Recall Colon  Recall colon in 3 years per PL.  Colon done 5/23/15

## 2018-04-24 ENCOUNTER — OFFICE VISIT (OUTPATIENT)
Dept: FAMILY MEDICINE CLINIC | Facility: CLINIC | Age: 58
End: 2018-04-24

## 2018-04-24 ENCOUNTER — TELEPHONE (OUTPATIENT)
Dept: FAMILY MEDICINE CLINIC | Facility: CLINIC | Age: 58
End: 2018-04-24

## 2018-04-24 VITALS
HEART RATE: 66 BPM | TEMPERATURE: 98 F | BODY MASS INDEX: 26 KG/M2 | DIASTOLIC BLOOD PRESSURE: 82 MMHG | WEIGHT: 128 LBS | SYSTOLIC BLOOD PRESSURE: 122 MMHG

## 2018-04-24 DIAGNOSIS — G47.01 INSOMNIA SECONDARY TO CHRONIC PAIN: ICD-10-CM

## 2018-04-24 DIAGNOSIS — M48.061 SPINAL STENOSIS OF LUMBAR REGION WITHOUT NEUROGENIC CLAUDICATION: ICD-10-CM

## 2018-04-24 DIAGNOSIS — G89.29 INSOMNIA SECONDARY TO CHRONIC PAIN: ICD-10-CM

## 2018-04-24 DIAGNOSIS — G89.29 CHRONIC LOW BACK PAIN WITHOUT SCIATICA, UNSPECIFIED BACK PAIN LATERALITY: Primary | ICD-10-CM

## 2018-04-24 DIAGNOSIS — M54.50 CHRONIC LOW BACK PAIN WITHOUT SCIATICA, UNSPECIFIED BACK PAIN LATERALITY: Primary | ICD-10-CM

## 2018-04-24 PROCEDURE — G0463 HOSPITAL OUTPT CLINIC VISIT: HCPCS | Performed by: FAMILY MEDICINE

## 2018-04-24 PROCEDURE — 99214 OFFICE O/P EST MOD 30 MIN: CPT | Performed by: FAMILY MEDICINE

## 2018-04-24 RX ORDER — ZOLPIDEM TARTRATE 10 MG/1
TABLET ORAL
Qty: 90 TABLET | Refills: 0 | Status: SHIPPED | OUTPATIENT
Start: 2018-04-24 | End: 2018-04-24

## 2018-04-24 NOTE — PROGRESS NOTES
4/24/2018  11:45 AM    Bob Ruffin is a 60year old female. Chief complaint(s): Patient presents with: Follow - Up  Back Pain    HPI:     Bob Ruffin primary complaint is regarding back pain.      Bob Ruffin is a 60 See medication list for a list of her current prescriptions. Remedies that she has already tried include OTC sleep aids.       HISTORY:  Past Medical History:   Diagnosis Date   • Carpal tunnel syndrome     L & R   • Cyst of finger 2013    R index   • Depr Rfl: 0   simvastatin 20 MG Oral Tab TAKE 1 TABLET BY MOUTH NIGHTLY. Disp: 90 tablet Rfl: 1   Losartan Potassium 50 MG Oral Tab TAKE 1 TABLET (50 MG TOTAL) BY MOUTH ONCE DAILY.  Disp: 90 tablet Rfl: 4   HYDROcodone-acetaminophen  MG Oral Tab Take 1 tab Normal rate and regular rhythm. Pulmonary/Chest: Effort normal and breath sounds normal. She has no wheezes. She has no rales. Abdominal: Soft. Normal appearance and bowel sounds are normal. There is no tenderness.    Musculoskeletal:   Negative for sc adhere to a proper sleep hygiene schedule, get regular exercise, maintain a sleep diary, reduce stress. FOLLOW-UP: Advised to call if there is no improvement in 7 day(s). Schedule follow-up appointments on a p.r.n. basis.           Orders This Visit:

## 2018-04-25 RX ORDER — ZOLPIDEM TARTRATE 10 MG/1
TABLET ORAL
Qty: 90 TABLET | Refills: 1 | Status: SHIPPED | OUTPATIENT
Start: 2018-04-25 | End: 2018-07-19

## 2018-04-25 NOTE — TELEPHONE ENCOUNTER
Spoke with [facility] who is requesting a refill on the zolpidem.  Rx dated 4/25/18 for the zolpidem was called into the pharmacy this nurse spoke with Rene Harrison the pharmacist.

## 2018-04-25 NOTE — TELEPHONE ENCOUNTER
Anastasia/[facility] [phone] is calling for status of medication refill request. Pt is out of medication.

## 2018-05-08 ENCOUNTER — LAB ENCOUNTER (OUTPATIENT)
Dept: LAB | Age: 58
End: 2018-05-08
Attending: FAMILY MEDICINE
Payer: MEDICARE

## 2018-05-08 ENCOUNTER — APPOINTMENT (OUTPATIENT)
Dept: LAB | Age: 58
End: 2018-05-08
Attending: FAMILY MEDICINE
Payer: MEDICARE

## 2018-05-08 ENCOUNTER — OFFICE VISIT (OUTPATIENT)
Dept: FAMILY MEDICINE CLINIC | Facility: CLINIC | Age: 58
End: 2018-05-08

## 2018-05-08 VITALS
SYSTOLIC BLOOD PRESSURE: 124 MMHG | HEART RATE: 65 BPM | BODY MASS INDEX: 25.6 KG/M2 | TEMPERATURE: 98 F | DIASTOLIC BLOOD PRESSURE: 85 MMHG | HEIGHT: 59 IN | WEIGHT: 127 LBS

## 2018-05-08 DIAGNOSIS — Z00.00 MEDICARE ANNUAL WELLNESS VISIT, SUBSEQUENT: ICD-10-CM

## 2018-05-08 DIAGNOSIS — M53.3 COCCYX PAIN: ICD-10-CM

## 2018-05-08 DIAGNOSIS — A04.8 H. PYLORI INFECTION: ICD-10-CM

## 2018-05-08 DIAGNOSIS — M54.12 CERVICAL RADICULOPATHY: ICD-10-CM

## 2018-05-08 DIAGNOSIS — I10 ESSENTIAL HYPERTENSION: ICD-10-CM

## 2018-05-08 DIAGNOSIS — D22.30 NEVUS OF FACE: ICD-10-CM

## 2018-05-08 DIAGNOSIS — G89.29 CHRONIC LOW BACK PAIN WITHOUT SCIATICA, UNSPECIFIED BACK PAIN LATERALITY: ICD-10-CM

## 2018-05-08 DIAGNOSIS — M48.061 SPINAL STENOSIS OF LUMBAR REGION WITHOUT NEUROGENIC CLAUDICATION: ICD-10-CM

## 2018-05-08 DIAGNOSIS — E78.00 PURE HYPERCHOLESTEROLEMIA: ICD-10-CM

## 2018-05-08 DIAGNOSIS — M54.50 CHRONIC LOW BACK PAIN WITHOUT SCIATICA, UNSPECIFIED BACK PAIN LATERALITY: ICD-10-CM

## 2018-05-08 DIAGNOSIS — G47.01 INSOMNIA SECONDARY TO CHRONIC PAIN: ICD-10-CM

## 2018-05-08 DIAGNOSIS — K63.5 POLYP OF COLON, UNSPECIFIED PART OF COLON, UNSPECIFIED TYPE: ICD-10-CM

## 2018-05-08 DIAGNOSIS — Z00.00 MEDICARE ANNUAL WELLNESS VISIT, SUBSEQUENT: Primary | ICD-10-CM

## 2018-05-08 DIAGNOSIS — K21.9 GASTROESOPHAGEAL REFLUX DISEASE WITHOUT ESOPHAGITIS: ICD-10-CM

## 2018-05-08 DIAGNOSIS — G89.29 INSOMNIA SECONDARY TO CHRONIC PAIN: ICD-10-CM

## 2018-05-08 DIAGNOSIS — M79.7 FIBROMYALGIA: ICD-10-CM

## 2018-05-08 DIAGNOSIS — Z13.820 OSTEOPOROSIS SCREENING: ICD-10-CM

## 2018-05-08 DIAGNOSIS — M72.2 PLANTAR FASCIITIS: ICD-10-CM

## 2018-05-08 PROCEDURE — 84443 ASSAY THYROID STIM HORMONE: CPT

## 2018-05-08 PROCEDURE — 80053 COMPREHEN METABOLIC PANEL: CPT

## 2018-05-08 PROCEDURE — 85025 COMPLETE CBC W/AUTO DIFF WBC: CPT

## 2018-05-08 PROCEDURE — 81015 MICROSCOPIC EXAM OF URINE: CPT | Performed by: FAMILY MEDICINE

## 2018-05-08 PROCEDURE — 81001 URINALYSIS AUTO W/SCOPE: CPT

## 2018-05-08 PROCEDURE — 93010 ELECTROCARDIOGRAM REPORT: CPT | Performed by: FAMILY MEDICINE

## 2018-05-08 PROCEDURE — 93005 ELECTROCARDIOGRAM TRACING: CPT

## 2018-05-08 PROCEDURE — 80061 LIPID PANEL: CPT

## 2018-05-08 PROCEDURE — G0438 PPPS, INITIAL VISIT: HCPCS | Performed by: FAMILY MEDICINE

## 2018-05-08 PROCEDURE — 86304 IMMUNOASSAY TUMOR CA 125: CPT

## 2018-05-08 PROCEDURE — 36415 COLL VENOUS BLD VENIPUNCTURE: CPT

## 2018-05-08 PROCEDURE — 83036 HEMOGLOBIN GLYCOSYLATED A1C: CPT

## 2018-05-08 RX ORDER — TRAMADOL HYDROCHLORIDE 50 MG/1
TABLET ORAL
Qty: 90 TABLET | Refills: 1 | Status: SHIPPED | OUTPATIENT
Start: 2018-05-08 | End: 2018-06-28

## 2018-05-08 NOTE — PROGRESS NOTES
Please call patient, results are within normal limits, except a1c at prediabetes range recheck in 6 months. Advise to follow a low carb diet.

## 2018-05-08 NOTE — PROGRESS NOTES
HPI:   Brenna Webster is a 60year old female who presents for a Medicare Subsequent Annual Wellness visit (Pt already had Initial Annual Wellness).     Brenna Webster is a 60year old female present today for a routine periodic health gyn Walking problems based on screening of functional status. Difficulty walking?: Yes (back issues)   She has problems with Daily Activities based on screening of functional status.    Problems with daily activities? : Yes (back issues)          Depression S 07/08/2017   ALT 19 07/08/2017   CA 9.5 07/08/2017   ALB 4.5 07/08/2017   TSH 1.36 07/16/2014   CREATSERUM 0.60 07/08/2017    (H) 07/08/2017        CBC  (most recent labs)     Lab Results  Component Value Date   WBC 7.4 07/08/2017   HGB 14.3 07/08/2 hysterectomy (2007). Her family history includes Diabetes in her brother and mother. SOCIAL HISTORY:   She  reports that she has never smoked. She has never used smokeless tobacco. She reports that she does not drink alcohol or use drugs.      REVIEW O No I have trouble following the conversations when two or more people are talking at the same time:  No   I have trouble understanding things on the TV:  Sometimes I have to strain to understand conversations:  No   I have to worry about missing the teleph and normal tonsils. Neck: Neck supple. No JVD present. No thyromegaly present. Cardiovascular: Normal rate, regular rhythm and S2 normal.  Exam reveals no gallop and no friction rub. No murmur heard. Pulmonary/Chest: No respiratory distress.  Right URINALYSIS, ROUTINE; Future  -     URINE MICROSCOPIC W REFLEX CULTURE  -     VITAMIN D, 25-HYDROXY    Chronic low back pain without sciatica, unspecified back pain laterality    Spinal stenosis of lumbar region without neurogenic claudication    Fibromyalg self breast examination to be done on a monthly basis. Consider a  if over weight and/or having difficult in staying active. Attempt to keep a schedule that includes adequate sleep, and physical activities/exercise.  Patient was educated on would you describe your daily physical activity?: Light  How would you describe your current health state?: Poor  How do you maintain positive mental well-being?: Visiting Family; Social Interaction      This section provided for quick review of doug orta valentin Cook due on 01/02/2019 Update Health Maintenance if applicable     Immunizations (Update Immunization Activity if applicable)     Influenza  Covered Annually 10/12/2017 Please get every year    Pneumococcal 13 (Prevnar)  Covered Once af

## 2018-05-24 ENCOUNTER — OFFICE VISIT (OUTPATIENT)
Dept: FAMILY MEDICINE CLINIC | Facility: CLINIC | Age: 58
End: 2018-05-24

## 2018-05-24 VITALS
DIASTOLIC BLOOD PRESSURE: 84 MMHG | WEIGHT: 127 LBS | HEART RATE: 78 BPM | SYSTOLIC BLOOD PRESSURE: 127 MMHG | TEMPERATURE: 98 F | BODY MASS INDEX: 26 KG/M2

## 2018-05-24 DIAGNOSIS — D22.9 NEVUS: Primary | ICD-10-CM

## 2018-05-24 PROCEDURE — 11421 EXC H-F-NK-SP B9+MARG 0.6-1: CPT | Performed by: FAMILY MEDICINE

## 2018-05-24 NOTE — PROGRESS NOTES
5/24/2018  3:14 PM    Alexx Davis is a 60year old female.     Chief complaint(s): Patient presents with:  Procedure    HPI:     Alexx Ramal primary complaint is regarding nevous removal.     Patient is a 77-year-old female who prese TAKE 1/2 TABLET BY MOUTH EVERY MORNING Disp: 30 tablet Rfl: 2   GABAPENTIN 300 MG Oral Cap TAKE 2 CAPSULES BY MOUTH EVERY MORNING AND TAKE 3 CAPSULES BY MOUTH IN THE EVENING Disp: 450 capsule Rfl: 0   simvastatin 20 MG Oral Tab TAKE 1 TABLET BY MOUTH NIGHT normal.   Left Ear: External ear normal.   Nose: No rhinorrhea. Mouth/Throat: Oropharynx is clear and moist.   Eyes: Conjunctivae are normal.   Neck: Neck supple. Cardiovascular: Normal rate and regular rhythm.     Pulmonary/Chest: Effort normal and allyson office with any questions or concerns. Notify Dr Mert García or the [address] [place] [address] [place] if there is a deterioration or worsening of the medical condition. Also, inform the doctor with any new symptoms or medications' side effects. Keep area clean.     FOLLOW-UP:

## 2018-05-29 ENCOUNTER — OFFICE VISIT (OUTPATIENT)
Dept: FAMILY MEDICINE CLINIC | Facility: CLINIC | Age: 58
End: 2018-05-29

## 2018-05-29 VITALS
SYSTOLIC BLOOD PRESSURE: 147 MMHG | DIASTOLIC BLOOD PRESSURE: 92 MMHG | BODY MASS INDEX: 26 KG/M2 | WEIGHT: 128 LBS | HEART RATE: 66 BPM

## 2018-05-29 DIAGNOSIS — M48.061 SPINAL STENOSIS OF LUMBAR REGION WITHOUT NEUROGENIC CLAUDICATION: ICD-10-CM

## 2018-05-29 DIAGNOSIS — M54.50 CHRONIC LOW BACK PAIN WITHOUT SCIATICA, UNSPECIFIED BACK PAIN LATERALITY: Primary | ICD-10-CM

## 2018-05-29 DIAGNOSIS — G89.29 CHRONIC LOW BACK PAIN WITHOUT SCIATICA, UNSPECIFIED BACK PAIN LATERALITY: Primary | ICD-10-CM

## 2018-05-29 DIAGNOSIS — D22.9 NEVUS: ICD-10-CM

## 2018-05-29 PROCEDURE — G0463 HOSPITAL OUTPT CLINIC VISIT: HCPCS | Performed by: FAMILY MEDICINE

## 2018-05-29 PROCEDURE — 99024 POSTOP FOLLOW-UP VISIT: CPT | Performed by: FAMILY MEDICINE

## 2018-05-29 RX ORDER — GABAPENTIN 300 MG/1
CAPSULE ORAL
Qty: 450 CAPSULE | Refills: 0 | OUTPATIENT
Start: 2018-05-29

## 2018-05-29 NOTE — TELEPHONE ENCOUNTER
Pt. Needs follow up - please have her schedule since it's over 1 year and then I can refill for her.

## 2018-05-29 NOTE — PROGRESS NOTES
5/29/2018  1:22 PM    Brenna Webster is a 60year old female.     Chief complaint(s): Patient presents with:  Suture Removal  Shoulder Pain: left shoulder pain    HPI:     Brenna Hazeljorge primary complaint is regarding back and shoulder damaris • Hypertension    • Obesity    • Other and unspecified hyperlipidemia    • Pre-diabetes    • Rotator cuff disorder     right shoulder   • Unspecified essential hypertension       Past Surgical History:  2013: CARPAL TUNNEL RELEASE Bilateral  2007: [place] Letters Glucose Blood (ANASTASIYA CONTOUR TEST) In Vitro Strip Test blood sugar twice daily. Disp: 100 strip Rfl: 6   Blood Glucose Monitoring Suppl (ANASTASIYA CONTOUR MONITOR) W/DEVICE Does not apply Kit Check blood sugar twice daily.  Disp: 1 kit Rfl: 0   Anastasiya Microlet well.    LABORATORY RESULTS:   No results found for: Chaim Armijo     Results for orders placed or performed in visit on 05/24/18  -SURGICAL PATHOLOGY TISSUE   Result Value Ref Range   Case Report Surgical Pathology claudication    MEDICATIONS:   REFERRALS: Dr Manny Cuellar. RECOMMENDATIONS given include: Please, call our office with any questions or concerns. Notify Dr Vergara Sender or the [address] [place] [address] if there is a deterioration or worsening of the medical condition.

## 2018-05-29 NOTE — TELEPHONE ENCOUNTER
LOV:3-16-17  Last Filled:2-26, #450 with 0 refill  Labs:   Future Appointments  Date Time Provider Callie Ana   5/29/2018 1:00 PM Tawana Chavis MD [facility]       Please Advise

## 2018-05-30 ENCOUNTER — TELEPHONE (OUTPATIENT)
Dept: FAMILY MEDICINE CLINIC | Facility: CLINIC | Age: 58
End: 2018-05-30

## 2018-05-30 DIAGNOSIS — M85.89 OSTEOPENIA OF MULTIPLE SITES: Primary | ICD-10-CM

## 2018-05-30 NOTE — TELEPHONE ENCOUNTER
Central scheduling called to advise that DX code for XR DEXA BONE DENSITOMETRY (CPT=77080) (DEXA) [[id number]] (Order [id number]) Failed medicare's medical necessity.      Please update the order

## 2018-06-04 NOTE — TELEPHONE ENCOUNTER
DX code for XR DEXA BONE DENSITOMETRY (CPT=77080) (DEXA) [[id number]] (Order [id number]) Failed medicare's medical necessity please advise

## 2018-06-09 ENCOUNTER — HOSPITAL ENCOUNTER (OUTPATIENT)
Dept: BONE DENSITY | Age: 58
Discharge: HOME | End: 2018-06-09
Attending: FAMILY MEDICINE
Payer: MEDICARE

## 2018-06-09 DIAGNOSIS — Z13.820 OSTEOPOROSIS SCREENING: ICD-10-CM

## 2018-06-09 DIAGNOSIS — Z00.00 MEDICARE ANNUAL WELLNESS VISIT, SUBSEQUENT: ICD-10-CM

## 2018-06-09 PROCEDURE — 77080 DXA BONE DENSITY AXIAL: CPT | Performed by: FAMILY MEDICINE

## 2018-06-18 ENCOUNTER — TELEPHONE (OUTPATIENT)
Dept: FAMILY MEDICINE CLINIC | Facility: CLINIC | Age: 58
End: 2018-06-18

## 2018-06-18 NOTE — TELEPHONE ENCOUNTER
Pt would like a call back with her bone density test. Pt also states that she can also be reached on her cell # [phone]. Pt is requesting return call in [address] [facility]

## 2018-06-20 NOTE — TELEPHONE ENCOUNTER
Result Notes     Notes recorded by Lane Clarke RN on 6/20/2018 at 1:01 PM CDT  No answer, no voicemail. Please try again later. Thanks. Please transfer to triage if pt calls back.    Pt does not have pending appt.  ------    Notes recorded by [facility]

## 2018-06-21 NOTE — TELEPHONE ENCOUNTER
No answer at home #. Calling Cell #. Results given to patient. Patient states would like to schedule soon appt. Did not want to wait. She would like to discuss back issues, and if any different recommendations can be given. appt given 6/28/18.

## 2018-06-28 ENCOUNTER — OFFICE VISIT (OUTPATIENT)
Dept: FAMILY MEDICINE CLINIC | Facility: CLINIC | Age: 58
End: 2018-06-28

## 2018-06-28 ENCOUNTER — APPOINTMENT (OUTPATIENT)
Dept: LAB | Age: 58
End: 2018-06-28
Attending: FAMILY MEDICINE
Payer: MEDICARE

## 2018-06-28 VITALS
HEART RATE: 78 BPM | WEIGHT: 128 LBS | DIASTOLIC BLOOD PRESSURE: 83 MMHG | BODY MASS INDEX: 26 KG/M2 | SYSTOLIC BLOOD PRESSURE: 131 MMHG | TEMPERATURE: 98 F

## 2018-06-28 DIAGNOSIS — M81.0 AGE-RELATED OSTEOPOROSIS WITHOUT CURRENT PATHOLOGICAL FRACTURE: Primary | ICD-10-CM

## 2018-06-28 PROCEDURE — 82306 VITAMIN D 25 HYDROXY: CPT | Performed by: FAMILY MEDICINE

## 2018-06-28 PROCEDURE — 99214 OFFICE O/P EST MOD 30 MIN: CPT | Performed by: FAMILY MEDICINE

## 2018-06-28 PROCEDURE — 36415 COLL VENOUS BLD VENIPUNCTURE: CPT | Performed by: FAMILY MEDICINE

## 2018-06-28 PROCEDURE — G0463 HOSPITAL OUTPT CLINIC VISIT: HCPCS | Performed by: FAMILY MEDICINE

## 2018-06-28 RX ORDER — HYDROCODONE BITARTRATE AND ACETAMINOPHEN 10; 325 MG/1; MG/1
1 TABLET ORAL 2 TIMES DAILY PRN
Qty: 30 TABLET | Refills: 1 | Status: SHIPPED | OUTPATIENT
Start: 2018-06-28 | End: 2018-11-27

## 2018-06-28 RX ORDER — RISEDRONATE SODIUM 35 MG/1
35 TABLET, FILM COATED ORAL
Qty: 12 TABLET | Refills: 4 | Status: SHIPPED | OUTPATIENT
Start: 2018-06-28 | End: 2019-04-05

## 2018-06-28 NOTE — PROGRESS NOTES
6/28/2018  3:39 PM    Judi Ballesteros is a 60year old female. Chief complaint(s): Patient presents with:  Test Results: Bone Density results    HPI:     Judi Ballesteros primary complaint is regarding abnl dexa scan.      Patient is a 60- Administered    Flulaval 0.5 ml 6 months & older (06014)                          10/12/2017      Influenza Vaccine, No Preserv, 3YR +                          12/05/2014      Medications (Active prior to today's visit):    Current Outpatient Prescriptions Musculoskeletal: Positive for myalgias and back pain. Neurological: Negative for seizures and headaches. Psychiatric/Behavioral: Negative for depressed mood.        PHYSICAL EXAM:   Physical Exam    Constitutional: She appears well-developed and well- to Prevention and Treatment of Osteoporosis recommendations for treatment: Post menopausal women and men age 48 and older presenting with the following should be considered for treatment: * A hip or vertebral (clinical or morphometric) fracture * T score < Disp Refills    Risedronate Sodium 35 MG Oral Tab 12 tablet 4      Sig: Take 1 tablet (35 mg total) by mouth every 7 days.       HYDROcodone-acetaminophen  MG Oral Tab 30 tablet 1      Sig: Take 1 tablet by mouth 2 (two) times daily as needed for Pain

## 2018-06-29 RX ORDER — ERGOCALCIFEROL 1.25 MG/1
50000 CAPSULE ORAL WEEKLY
Qty: 12 CAPSULE | Refills: 4 | Status: SHIPPED | OUTPATIENT
Start: 2018-06-29 | End: 2018-07-29

## 2018-07-16 ENCOUNTER — TELEPHONE (OUTPATIENT)
Dept: GASTROENTEROLOGY | Facility: CLINIC | Age: 58
End: 2018-07-16

## 2018-07-16 ENCOUNTER — TELEPHONE (OUTPATIENT)
Dept: FAMILY MEDICINE CLINIC | Facility: CLINIC | Age: 58
End: 2018-07-16

## 2018-07-16 NOTE — TELEPHONE ENCOUNTER
Patient will need to schedule an appointment with Dr. Pushpa Amos for this injection since it cannot be done as a nurse visit.

## 2018-07-16 NOTE — TELEPHONE ENCOUNTER
The patient's chart has been reviewed. Okay to schedule pt for 3 year colonoscopy recall r/t hx colon polyps with Dr. Ermelinda Gordon. Advise MAC due to hydrocodone use with split dose Colyte or equivalent (eRx) preparation.      May continue all medications list

## 2018-07-16 NOTE — TELEPHONE ENCOUNTER
Last Procedure:  CLN 5/23/15  Last Diagnosis:  POST-PROCEDURE DIAGNOSES:  1. Two colonic polyps. 2.  Diverticulosis. 3.  Internal hemorrhoids. 4.  Hiatal hernia. 5.  Gastritis.   Hx of h.pylori  Recalled for (years): 3 yrs-due 5/23/18  Sedation used pr

## 2018-07-16 NOTE — TELEPHONE ENCOUNTER
Pt indicates she received letter for 3 yr recall cln, pt needs clarification as to when she is due, pt thought it was for every 5 yrs, pls call with  at:M#[phone],thanks.   *Pt also ask that any letter be sent in [address] [address]

## 2018-07-19 ENCOUNTER — OFFICE VISIT (OUTPATIENT)
Dept: FAMILY MEDICINE CLINIC | Facility: CLINIC | Age: 58
End: 2018-07-19
Payer: MEDICARE

## 2018-07-19 VITALS
TEMPERATURE: 98 F | WEIGHT: 129 LBS | BODY MASS INDEX: 26 KG/M2 | HEART RATE: 77 BPM | SYSTOLIC BLOOD PRESSURE: 141 MMHG | DIASTOLIC BLOOD PRESSURE: 91 MMHG

## 2018-07-19 DIAGNOSIS — L30.9 ECZEMA, UNSPECIFIED TYPE: ICD-10-CM

## 2018-07-19 DIAGNOSIS — M85.89 OSTEOPENIA OF MULTIPLE SITES: Primary | ICD-10-CM

## 2018-07-19 DIAGNOSIS — M54.50 CHRONIC LOW BACK PAIN WITHOUT SCIATICA, UNSPECIFIED BACK PAIN LATERALITY: ICD-10-CM

## 2018-07-19 DIAGNOSIS — G89.29 INSOMNIA SECONDARY TO CHRONIC PAIN: ICD-10-CM

## 2018-07-19 DIAGNOSIS — G47.01 INSOMNIA SECONDARY TO CHRONIC PAIN: ICD-10-CM

## 2018-07-19 DIAGNOSIS — G89.29 CHRONIC LOW BACK PAIN WITHOUT SCIATICA, UNSPECIFIED BACK PAIN LATERALITY: ICD-10-CM

## 2018-07-19 DIAGNOSIS — M81.0 AGE-RELATED OSTEOPOROSIS WITHOUT CURRENT PATHOLOGICAL FRACTURE: ICD-10-CM

## 2018-07-19 PROCEDURE — 99214 OFFICE O/P EST MOD 30 MIN: CPT | Performed by: FAMILY MEDICINE

## 2018-07-19 PROCEDURE — G0463 HOSPITAL OUTPT CLINIC VISIT: HCPCS | Performed by: FAMILY MEDICINE

## 2018-07-19 RX ORDER — MOMETASONE FUROATE 1 MG/G
CREAM TOPICAL
Qty: 30 G | Refills: 1 | Status: SHIPPED | OUTPATIENT
Start: 2018-07-19 | End: 2019-02-01

## 2018-07-19 RX ORDER — ZOLPIDEM TARTRATE 10 MG/1
TABLET ORAL
Qty: 90 TABLET | Refills: 1 | Status: SHIPPED | OUTPATIENT
Start: 2018-07-19 | End: 2019-05-28

## 2018-07-19 RX ORDER — OMEPRAZOLE 40 MG/1
CAPSULE, DELAYED RELEASE ORAL
Qty: 90 CAPSULE | Refills: 1 | Status: SHIPPED | OUTPATIENT
Start: 2018-07-19 | End: 2019-01-22

## 2018-07-19 NOTE — PROGRESS NOTES
7/19/2018  3:41 PM    Florencia Smith is a 60year old female. Chief complaint(s): Patient presents with: Follow - Up  Osteoporosis    HPI:     Florencia Smith primary complaint is regarding back pain/osteoporosis.      Patient is a 60-y ml 6 months & older (49262)                          10/12/2017      Influenza Vaccine, No Preserv, 3YR +                          12/05/2014      Medications (Active prior to today's visit):    Current Outpatient Prescriptions:  Mometasone Furoate 0.1 % E Constitutional: Negative for chills, fatigue and fever. HENT: Negative for ear pain and sore throat. Respiratory: Negative for cough and wheezing. Cardiovascular: Negative for chest pain and palpitations.    Gastrointestinal: Negative for nausea, affected area(s) BID, Disp: 30 g, Rfl: 1  •  ergocalciferol 25662 units Oral Cap, Take 1 capsule (50,000 Units total) by mouth once a week., Disp: 12 capsule, Rfl: 4  •  HYDROcodone-acetaminophen  MG Oral Tab, Take 1 tablet by mouth 2 (two) times rdaha doctor with any new symptoms or medications' side effects. FOLLOW-UP: Schedule a follow-up visit in  Prn /kpa. Orders This Visit:  No orders of the defined types were placed in this encounter.       Meds This Visit:    Pending Prescriptions Dis

## 2018-07-19 NOTE — TELEPHONE ENCOUNTER
CBLM to schedule procedure. Please transfer to [facility] at ext [phone] or [phone] for scheduling. Or please transfer to [facility] in GI if unavailable.

## 2018-07-23 ENCOUNTER — TELEPHONE (OUTPATIENT)
Dept: GASTROENTEROLOGY | Facility: CLINIC | Age: 58
End: 2018-07-23

## 2018-07-23 DIAGNOSIS — Z86.010 PERSONAL HISTORY OF COLONIC POLYPS: Primary | ICD-10-CM

## 2018-07-23 NOTE — TELEPHONE ENCOUNTER
Pt calling to [facility] her 3yr recall (declined consult) pt informed about the 72hr cb with , pls call at: Z#[phone] or [phone],DEA.

## 2018-07-23 NOTE — TELEPHONE ENCOUNTER
#[id number]     Last Procedure:  05/23/15 colonoscopy and EGD  Last Diagnosis:  2 colonic polyps,diverticulosis,internal hemorrhoids,hiatal hernia and gastritis  Recalled for (years): 3 years  Sedation used previously:  Versed and Fentanyl  Last Pr

## 2018-07-23 NOTE — TELEPHONE ENCOUNTER
The patient's chart has been reviewed. Okay to schedule pt for 3 year colonoscopy recall r/t history of colon polyps with Dr. Racheal Clark. Advise MAC due to use of hydrocodone with split dose Colyte or equivalent (eRx) preparation.      May continue all medic

## 2018-07-24 NOTE — TELEPHONE ENCOUNTER
Contacted pt using language line.       Scheduled for:  Colonoscopy - 52609  Provider Name:  Dr. Jaguar Bran  Date:  8/28/18  [place]  [facility]  Sedation:  MAC  Time:  10:30 am (pt is aware to arrive at 9:30 am)  Prep:  Colyte, Prep instructions were given to

## 2018-08-04 RX ORDER — TRAMADOL HYDROCHLORIDE 50 MG/1
TABLET ORAL
Qty: 90 TABLET | Refills: 1
Start: 2018-08-04

## 2018-08-28 ENCOUNTER — HOSPITAL ENCOUNTER (OUTPATIENT)
Age: 58
Setting detail: HOSPITAL OUTPATIENT SURGERY
Discharge: HOME | End: 2018-08-28
Attending: INTERNAL MEDICINE | Admitting: INTERNAL MEDICINE
Payer: MEDICARE

## 2018-08-28 ENCOUNTER — ANESTHESIA (OUTPATIENT)
Dept: ENDOSCOPY | Age: 58
End: 2018-08-28
Payer: MEDICARE

## 2018-08-28 ENCOUNTER — ANESTHESIA EVENT (OUTPATIENT)
Dept: ENDOSCOPY | Age: 58
End: 2018-08-28
Payer: MEDICARE

## 2018-08-28 ENCOUNTER — SURGERY (OUTPATIENT)
Age: 58
End: 2018-08-28

## 2018-08-28 VITALS
SYSTOLIC BLOOD PRESSURE: 134 MMHG | HEART RATE: 54 BPM | RESPIRATION RATE: 13 BRPM | OXYGEN SATURATION: 100 % | BODY MASS INDEX: 26 KG/M2 | WEIGHT: 129 LBS | DIASTOLIC BLOOD PRESSURE: 86 MMHG | HEIGHT: 59 IN

## 2018-08-28 DIAGNOSIS — Z86.010 PERSONAL HISTORY OF COLONIC POLYPS: ICD-10-CM

## 2018-08-28 PROCEDURE — 99070 SPECIAL SUPPLIES PHYS/QHP: CPT | Performed by: INTERNAL MEDICINE

## 2018-08-28 PROCEDURE — 45385 COLONOSCOPY W/LESION REMOVAL: CPT | Performed by: INTERNAL MEDICINE

## 2018-08-28 PROCEDURE — 88305 TISSUE EXAM BY PATHOLOGIST: CPT | Performed by: INTERNAL MEDICINE

## 2018-08-28 PROCEDURE — 45380 COLONOSCOPY AND BIOPSY: CPT | Performed by: INTERNAL MEDICINE

## 2018-08-28 RX ORDER — LIDOCAINE HYDROCHLORIDE 10 MG/ML
INJECTION, SOLUTION EPIDURAL; INFILTRATION; INTRACAUDAL; PERINEURAL AS NEEDED
Status: DISCONTINUED | OUTPATIENT
Start: 2018-08-28 | End: 2018-08-28 | Stop reason: SURG

## 2018-08-28 RX ORDER — NALOXONE HYDROCHLORIDE 0.4 MG/ML
80 INJECTION, SOLUTION INTRAMUSCULAR; INTRAVENOUS; SUBCUTANEOUS AS NEEDED
Status: CANCELLED | OUTPATIENT
Start: 2018-08-28 | End: 2018-08-28

## 2018-08-28 RX ORDER — SODIUM CHLORIDE, SODIUM LACTATE, POTASSIUM CHLORIDE, CALCIUM CHLORIDE 600; 310; 30; 20 MG/100ML; MG/100ML; MG/100ML; MG/100ML
INJECTION, SOLUTION INTRAVENOUS CONTINUOUS
Status: CANCELLED | OUTPATIENT
Start: 2018-08-28

## 2018-08-28 RX ORDER — SODIUM CHLORIDE, SODIUM LACTATE, POTASSIUM CHLORIDE, CALCIUM CHLORIDE 600; 310; 30; 20 MG/100ML; MG/100ML; MG/100ML; MG/100ML
INJECTION, SOLUTION INTRAVENOUS CONTINUOUS
Status: DISCONTINUED | OUTPATIENT
Start: 2018-08-28 | End: 2018-08-28

## 2018-08-28 RX ADMIN — SODIUM CHLORIDE, SODIUM LACTATE, POTASSIUM CHLORIDE, CALCIUM CHLORIDE: 600; 310; 30; 20 INJECTION, SOLUTION INTRAVENOUS at 10:36:00

## 2018-08-28 RX ADMIN — SODIUM CHLORIDE, SODIUM LACTATE, POTASSIUM CHLORIDE, CALCIUM CHLORIDE: 600; 310; 30; 20 INJECTION, SOLUTION INTRAVENOUS at 11:03:00

## 2018-08-28 RX ADMIN — LIDOCAINE HYDROCHLORIDE 40 MG: 10 INJECTION, SOLUTION EPIDURAL; INFILTRATION; INTRACAUDAL; PERINEURAL at 10:39:00

## 2018-08-28 NOTE — ANESTHESIA PREPROCEDURE EVALUATION
Anesthesia PreOp Note    HPI:     Cristin is a 60year old female who presents for preoperative consultation requested by: Shruthi Stoll MD    Date of Surgery: 8/28/2018    Procedure(s):  COLONOSCOPY  Indication: Personal history of col capsule Rfl: 1 8/27/2018   Risedronate Sodium 35 MG Oral Tab Take 1 tablet (35 mg total) by mouth every 7 days. Disp: 12 tablet Rfl: 4 8/26/2018   HYDROcodone-acetaminophen  MG Oral Tab Take 1 tablet by mouth 2 (two) times daily as needed for Pain.  D Social History Narrative    The patient does not use an assistive device. .      The patient does live in a home with stairs. Available pre-op labs reviewed. Vital Signs: Body mass index is 26.05 kg/m².    height is 1.499 m (4' 11\

## 2018-08-28 NOTE — H&P
History & Physical Examination    Patient Name: [place]  MRN: [id number]  CSN: [id number]  YOB: 1960    Diagnosis: history of colon polyp    Prescriptions Prior to Admission:  PEG 3350-[id number]-NaCl-NaSulf (COLYTE WITH FLAVOR Seasonal                    Past Medical History:   Diagnosis Date   • Anxiety state    • Carpal tunnel syndrome     L & R   • Cyst of finger 2013    R index   • Depression    • Esophageal reflux    • High blood pressure    • High choleste

## 2018-08-28 NOTE — OPERATIVE REPORT
[facility] Endoscopy Report      Preoperative Diagnosis:  - history of colon polyps       Postoperative Diagnosis:  - colon polyps x 3  - diverticular disease with associated colitis  - internal hemorrhoids      Procedure:    Colonoscopy diverticular disease with associated colitis  - internal hemorrhoids    Recommendations:  - Post polypectomy instructions given  - Repeat colonoscopy in 3- 5 years  - High fiber diet for diverticular disease  - Symptomatic treatment of hemorrhoids

## 2018-08-29 ENCOUNTER — TELEPHONE (OUTPATIENT)
Dept: GASTROENTEROLOGY | Facility: CLINIC | Age: 58
End: 2018-08-29

## 2018-08-29 NOTE — TELEPHONE ENCOUNTER
----- Message from Eder Cummings MD sent at 8/29/2018 12:45 PM CDT -----  RN to place on the colon call back for 5 years and mail letter to the pt. Thanks.

## 2018-09-27 ENCOUNTER — OFFICE VISIT (OUTPATIENT)
Dept: ORTHOPEDICS CLINIC | Facility: CLINIC | Age: 58
End: 2018-09-27
Payer: MEDICARE

## 2018-09-27 ENCOUNTER — HOSPITAL ENCOUNTER (OUTPATIENT)
Dept: GENERAL RADIOLOGY | Facility: HOSPITAL | Age: 58
Discharge: HOME | End: 2018-09-27
Attending: ORTHOPAEDIC SURGERY | Admitting: ORTHOPAEDIC SURGERY
Payer: MEDICARE

## 2018-09-27 DIAGNOSIS — M25.512 LEFT SHOULDER PAIN, UNSPECIFIED CHRONICITY: ICD-10-CM

## 2018-09-27 DIAGNOSIS — M75.122 COMPLETE TEAR OF LEFT ROTATOR CUFF: Primary | ICD-10-CM

## 2018-09-27 PROCEDURE — 20610 DRAIN/INJ JOINT/BURSA W/O US: CPT | Performed by: ORTHOPAEDIC SURGERY

## 2018-09-27 PROCEDURE — 99213 OFFICE O/P EST LOW 20 MIN: CPT | Performed by: ORTHOPAEDIC SURGERY

## 2018-09-27 PROCEDURE — 73030 X-RAY EXAM OF SHOULDER: CPT | Performed by: ORTHOPAEDIC SURGERY

## 2018-09-27 NOTE — H&P
Chief Complaint: Left shoulder pain    NURSING INTAKE COMMENTS: Patient presents with:  Consult: C/o constant burning pain in the left shoulder for 3 months. Stts she's unable to lift arm above her head. Had left shoulder surgery in 2014.   Pain level 9 Examination:  There is no height or weight on file to calculate BMI. This 60year old female is A&O in no acute distress.   SHOULDER EXAM: LEFT  RIGHT   Range of Motion-FF 90 WNL   Abd 80 WNL   IR T12 WNL   ER WNL WNL   Atrophy None None   Bruising None No

## 2018-09-27 NOTE — PROGRESS NOTES
Verbal order given by Dr. Shay Dominguez to draw up 20 mg of Kenalog and 2 cc 1% lidocaine for a left shoulder injection.   Pt had left the office before I could obtain post vital injections

## 2018-10-02 RX ORDER — SIMVASTATIN 20 MG
TABLET ORAL
Qty: 90 TABLET | Refills: 0 | Status: SHIPPED | OUTPATIENT
Start: 2018-10-02 | End: 2018-11-27

## 2018-10-02 NOTE — TELEPHONE ENCOUNTER
Requested Prescriptions     Pending Prescriptions Disp Refills   • SIMVASTATIN 20 MG Oral Tab [Pharmacy Med Name: SIMVASTATIN 20 MG TABLET] 90 tablet 1     Sig: TAKE 1 TABLET BY MOUTH NIGHTLY. Refill approved per protocol.   Cholesterol Medications

## 2018-11-02 RX ORDER — TRAMADOL HYDROCHLORIDE 50 MG/1
TABLET ORAL
Qty: 90 TABLET | OUTPATIENT
Start: 2018-11-02

## 2018-11-08 ENCOUNTER — HOSPITAL ENCOUNTER (OUTPATIENT)
Dept: MRI IMAGING | Age: 58
Discharge: HOME | End: 2018-11-08
Attending: ORTHOPAEDIC SURGERY
Payer: MEDICARE

## 2018-11-08 DIAGNOSIS — M75.122 COMPLETE TEAR OF LEFT ROTATOR CUFF: ICD-10-CM

## 2018-11-08 DIAGNOSIS — M25.512 LEFT SHOULDER PAIN, UNSPECIFIED CHRONICITY: ICD-10-CM

## 2018-11-08 PROCEDURE — 73221 MRI JOINT UPR EXTREM W/O DYE: CPT | Performed by: ORTHOPAEDIC SURGERY

## 2018-11-19 ENCOUNTER — OFFICE VISIT (OUTPATIENT)
Dept: ORTHOPEDICS CLINIC | Facility: CLINIC | Age: 58
End: 2018-11-19
Payer: MEDICARE

## 2018-11-19 DIAGNOSIS — M75.122 COMPLETE ROTATOR CUFF TEAR OF LEFT SHOULDER: Primary | ICD-10-CM

## 2018-11-19 PROCEDURE — 99213 OFFICE O/P EST LOW 20 MIN: CPT | Performed by: ORTHOPAEDIC SURGERY

## 2018-11-19 PROCEDURE — G0463 HOSPITAL OUTPT CLINIC VISIT: HCPCS | Performed by: ORTHOPAEDIC SURGERY

## 2018-11-19 NOTE — H&P
Chief Complaint: left shoulder pain    NURSING INTAKE COMMENTS: Patient presents with:  Shoulder Pain: Pt is here for a follow up on her left shoulder. Pain scale 9 of the shoulder. Pt had MRI. To discuss the results. History of present illness:   Jonatan Rodas 110 WNL   Abd 90 WNL   IR T12 WNL   ER WNL WNL   Atrophy None None   Bruising None None   Strength        Supraspinatus 4/5 5/5      Infraspinatus 5/5 5/5      Teres Minor 5/5 5/5      Subscapularis 5/5 5/5      Deltoid 5/5 5/5      Biceps 5/5 5/5        P

## 2018-11-27 ENCOUNTER — OFFICE VISIT (OUTPATIENT)
Dept: FAMILY MEDICINE CLINIC | Facility: CLINIC | Age: 58
End: 2018-11-27
Payer: MEDICARE

## 2018-11-27 VITALS
SYSTOLIC BLOOD PRESSURE: 153 MMHG | BODY MASS INDEX: 25.16 KG/M2 | DIASTOLIC BLOOD PRESSURE: 91 MMHG | HEART RATE: 65 BPM | WEIGHT: 124.81 LBS | TEMPERATURE: 98 F | HEIGHT: 59 IN

## 2018-11-27 DIAGNOSIS — G89.29 INSOMNIA SECONDARY TO CHRONIC PAIN: ICD-10-CM

## 2018-11-27 DIAGNOSIS — I10 ESSENTIAL HYPERTENSION: Primary | ICD-10-CM

## 2018-11-27 DIAGNOSIS — M75.102 TEAR OF LEFT ROTATOR CUFF, UNSPECIFIED TEAR EXTENT: ICD-10-CM

## 2018-11-27 DIAGNOSIS — G47.01 INSOMNIA SECONDARY TO CHRONIC PAIN: ICD-10-CM

## 2018-11-27 DIAGNOSIS — E78.00 PURE HYPERCHOLESTEROLEMIA: ICD-10-CM

## 2018-11-27 PROCEDURE — G0463 HOSPITAL OUTPT CLINIC VISIT: HCPCS | Performed by: FAMILY MEDICINE

## 2018-11-27 PROCEDURE — 99214 OFFICE O/P EST MOD 30 MIN: CPT | Performed by: FAMILY MEDICINE

## 2018-11-27 PROCEDURE — G0008 ADMIN INFLUENZA VIRUS VAC: HCPCS | Performed by: FAMILY MEDICINE

## 2018-11-27 PROCEDURE — 90686 IIV4 VACC NO PRSV 0.5 ML IM: CPT | Performed by: FAMILY MEDICINE

## 2018-11-27 RX ORDER — LOSARTAN POTASSIUM 50 MG/1
TABLET ORAL
Qty: 90 TABLET | Refills: 4 | Status: SHIPPED | OUTPATIENT
Start: 2018-11-27 | End: 2019-04-05

## 2018-11-27 RX ORDER — TRAMADOL HYDROCHLORIDE 50 MG/1
TABLET ORAL
Refills: 2 | COMMUNITY
Start: 2018-08-23 | End: 2018-11-27

## 2018-11-27 RX ORDER — GABAPENTIN 300 MG/1
CAPSULE ORAL
Qty: 450 CAPSULE | Refills: 0 | Status: SHIPPED | OUTPATIENT
Start: 2018-11-27 | End: 2019-02-19

## 2018-11-27 RX ORDER — TRAMADOL HYDROCHLORIDE 50 MG/1
TABLET ORAL
Qty: 90 TABLET | OUTPATIENT
Start: 2018-11-27

## 2018-11-27 RX ORDER — BLOOD PRESSURE TEST KIT
KIT MISCELLANEOUS
Qty: 1 KIT | Refills: 0 | Status: SHIPPED | OUTPATIENT
Start: 2018-11-27 | End: 2018-12-01

## 2018-11-27 RX ORDER — TRAMADOL HYDROCHLORIDE 50 MG/1
50 TABLET ORAL EVERY 8 HOURS PRN
Qty: 60 TABLET | Refills: 1 | Status: SHIPPED | OUTPATIENT
Start: 2018-11-27 | End: 2019-01-14

## 2018-11-27 RX ORDER — ZOLPIDEM TARTRATE 10 MG/1
TABLET ORAL
Qty: 90 TABLET | OUTPATIENT
Start: 2018-11-27

## 2018-11-27 RX ORDER — SIMVASTATIN 20 MG
20 TABLET ORAL NIGHTLY
Qty: 90 TABLET | Refills: 2 | Status: SHIPPED | OUTPATIENT
Start: 2018-11-27 | End: 2019-08-29

## 2018-11-27 NOTE — PROGRESS NOTES
11/27/2018  1:23 PM    Caitlyn Stage is a 60year old female. Chief complaint(s): Patient presents with:   Follow - Up: Patient here to f/u needs refills on medications  HTN  Hyperlipidemia    HPI:     Caitlyn Stage primary complaint pressure    • High cholesterol    • Hyperlipidemia    • Hypertension    • Obesity    • Other and unspecified hyperlipidemia    • Pre-diabetes    • Prediabetes    • Rotator cuff disorder     right shoulder   • Unspecified essential hypertension       Past S tablet Rfl: 1   Omeprazole 40 MG Oral Capsule Delayed Release TAKE 1 CAPSULE (40 MG TOTAL) BY MOUTH DAILY. Disp: 90 capsule Rfl: 1   Risedronate Sodium 35 MG Oral Tab Take 1 tablet (35 mg total) by mouth every 7 days.  Disp: 12 tablet Rfl: 4   Glucose Blood Neck supple. Cardiovascular: Normal rate and regular rhythm. Pulmonary/Chest: Effort normal and breath sounds normal. She has no wheezes. She has no rales.    Musculoskeletal:   + lumbar decrease ROM, + tenderness  + left shoulder stiff, decrease ROM, SUBDELTOID BURSA:  There is thickening with increased fluid in the subacromial subdeltoid bursa. CONCLUSION:   Partial-thickness articular surface tears of the supraspinatus and infraspinatus with subacromial subdeltoid bursitis.   Mild intra-articular MG Oral Cap 450 capsule 0     Sig: TAKE 2 CAPSULES BY MOUTH EVERY MORNING AND TAKE 3 CAPSULES BY MOUTH IN THE EVENING   • Losartan Potassium 50 MG Oral Tab 90 tablet 4     Sig: TAKE 1 TABLET (50 MG TOTAL) BY MOUTH ONCE DAILY.    • simvastatin 20 MG Oral Tab AND TAKE 3 CAPSULES BY MOUTH IN THE EVENING   • Losartan Potassium 50 MG Oral Tab 90 tablet 4     Sig: TAKE 1 TABLET (50 MG TOTAL) BY MOUTH ONCE DAILY. • simvastatin 20 MG Oral Tab 90 tablet 2     Sig: Take 1 tablet (20 mg total) by mouth nightly.    • Tr

## 2018-11-27 NOTE — TELEPHONE ENCOUNTER
Pt seen today in [facility] by Dr Chito Ridley. MD on 11/27/18 refilled tramadol 50 mg for 60 tablets with one additional refill. Called script to [facility] pharmacy in [place] to [facility].   Note: MD on 7/19/18  wrote script for zolpidem 10 mg nightly quantity 90+1 a

## 2018-12-01 RX ORDER — BLOOD PRESSURE TEST KIT
KIT MISCELLANEOUS
Qty: 1 KIT | Refills: 0 | Status: SHIPPED | OUTPATIENT
Start: 2018-12-01

## 2018-12-01 NOTE — TELEPHONE ENCOUNTER
Requested Prescriptions     Pending Prescriptions Disp Refills   • Blood Pressure Does not apply Kit 1 kit 0     Sig: Use as directed       Last Office Visit with PCP: 11/27/2018    Unable to fill per protocol. Please advise. Thank you.

## 2019-01-14 ENCOUNTER — OFFICE VISIT (OUTPATIENT)
Dept: FAMILY MEDICINE CLINIC | Facility: CLINIC | Age: 59
End: 2019-01-14
Payer: MEDICARE

## 2019-01-14 VITALS
BODY MASS INDEX: 25.68 KG/M2 | WEIGHT: 127.38 LBS | TEMPERATURE: 99 F | RESPIRATION RATE: 14 BRPM | DIASTOLIC BLOOD PRESSURE: 86 MMHG | HEIGHT: 59 IN | SYSTOLIC BLOOD PRESSURE: 135 MMHG | HEART RATE: 61 BPM

## 2019-01-14 DIAGNOSIS — I10 ESSENTIAL HYPERTENSION: Primary | ICD-10-CM

## 2019-01-14 DIAGNOSIS — R21 RASH: ICD-10-CM

## 2019-01-14 PROCEDURE — 99213 OFFICE O/P EST LOW 20 MIN: CPT | Performed by: FAMILY MEDICINE

## 2019-01-14 PROCEDURE — G0463 HOSPITAL OUTPT CLINIC VISIT: HCPCS | Performed by: FAMILY MEDICINE

## 2019-01-14 RX ORDER — DIAPER,BRIEF,INFANT-TODD,DISP
EACH MISCELLANEOUS
Qty: 30 G | Refills: 0 | Status: SHIPPED | OUTPATIENT
Start: 2019-01-14

## 2019-01-14 RX ORDER — TRAMADOL HYDROCHLORIDE 50 MG/1
50 TABLET ORAL EVERY 8 HOURS PRN
Qty: 60 TABLET | Refills: 1 | Status: ON HOLD | OUTPATIENT
Start: 2019-01-14 | End: 2019-02-05

## 2019-01-14 NOTE — PROGRESS NOTES
1/14/2019  3:01 PM    Tena Small is a 60year old female. Chief complaint(s): Patient presents with:  Hypertension  Bump: on left hand index finger     HPI:     Tena Small primary complaint is regarding HTN.      Shaneka Rodriguez 10/12/2017  11/27/2018      FLUZONE 3 Yrs+ Quad Prsv Free 0.5 ml (32843)                          12/05/2014      Medications (Active prior to today's visit):    Current Outpatient Medications:  TraMADol HCl 50 MG Oral Tab Take 1 tab fatigue and fever. HENT: Negative for ear pain and sore throat. Respiratory: Negative for cough and wheezing. Cardiovascular: Negative for chest pain and palpitations.    Gastrointestinal: Negative for nausea, vomiting, abdominal pain, diarrhea and Requested Prescriptions     Signed Prescriptions Disp Refills   • TraMADol HCl 50 MG Oral Tab 60 tablet 1     Sig: Take 1 tablet (50 mg total) by mouth every 8 (eight) hours as needed for Pain.    • hydrocortisone 1 % External Cream 30 g 0     Sig: Apply

## 2019-01-22 RX ORDER — OMEPRAZOLE 40 MG/1
CAPSULE, DELAYED RELEASE ORAL
Qty: 90 CAPSULE | Refills: 1 | Status: SHIPPED | OUTPATIENT
Start: 2019-01-22 | End: 2019-02-01

## 2019-01-23 RX ORDER — OMEPRAZOLE 40 MG/1
CAPSULE, DELAYED RELEASE ORAL
Qty: 90 CAPSULE | Refills: 1 | Status: SHIPPED | OUTPATIENT
Start: 2019-01-23 | End: 2020-01-10

## 2019-01-23 NOTE — TELEPHONE ENCOUNTER
Refill Protocol Appointment Criteria  · Appointment scheduled in the past 12 months or in the next 3 months  Recent Outpatient Visits            1 week ago Essential hypertension    [facility], [facility], [address], Chandu Worley MD    Office Visi

## 2019-02-05 ENCOUNTER — HOSPITAL ENCOUNTER (OUTPATIENT)
Facility: HOSPITAL | Age: 59
Setting detail: HOSPITAL OUTPATIENT SURGERY
Discharge: HOME | End: 2019-02-05
Attending: ORTHOPAEDIC SURGERY | Admitting: ORTHOPAEDIC SURGERY
Payer: MEDICARE

## 2019-02-05 ENCOUNTER — ANESTHESIA EVENT (OUTPATIENT)
Dept: SURGERY | Facility: HOSPITAL | Age: 59
End: 2019-02-05
Payer: MEDICARE

## 2019-02-05 ENCOUNTER — ANESTHESIA (OUTPATIENT)
Dept: SURGERY | Facility: HOSPITAL | Age: 59
End: 2019-02-05
Payer: MEDICARE

## 2019-02-05 VITALS
SYSTOLIC BLOOD PRESSURE: 136 MMHG | RESPIRATION RATE: 17 BRPM | OXYGEN SATURATION: 95 % | HEIGHT: 59 IN | WEIGHT: 128 LBS | DIASTOLIC BLOOD PRESSURE: 70 MMHG | BODY MASS INDEX: 25.8 KG/M2 | TEMPERATURE: 98 F | HEART RATE: 60 BPM

## 2019-02-05 DIAGNOSIS — M75.122 COMPLETE ROTATOR CUFF TEAR OF LEFT SHOULDER: ICD-10-CM

## 2019-02-05 LAB — GLUCOSE BLDC GLUCOMTR-MCNC: 93 MG/DL (ref 70–99)

## 2019-02-05 PROCEDURE — 82962 GLUCOSE BLOOD TEST: CPT

## 2019-02-05 PROCEDURE — 3E0T3BZ INTRODUCTION OF ANESTHETIC AGENT INTO PERIPHERAL NERVES AND PLEXI, PERCUTANEOUS APPROACH: ICD-10-PCS | Performed by: ANESTHESIOLOGY

## 2019-02-05 PROCEDURE — 0LS24ZZ REPOSITION LEFT SHOULDER TENDON, PERCUTANEOUS ENDOSCOPIC APPROACH: ICD-10-PCS | Performed by: ORTHOPAEDIC SURGERY

## 2019-02-05 PROCEDURE — 99152 MOD SED SAME PHYS/QHP 5/>YRS: CPT | Performed by: ORTHOPAEDIC SURGERY

## 2019-02-05 PROCEDURE — 0LQ24ZZ REPAIR LEFT SHOULDER TENDON, PERCUTANEOUS ENDOSCOPIC APPROACH: ICD-10-PCS | Performed by: ORTHOPAEDIC SURGERY

## 2019-02-05 PROCEDURE — 76942 ECHO GUIDE FOR BIOPSY: CPT | Performed by: ORTHOPAEDIC SURGERY

## 2019-02-05 PROCEDURE — 64415 NJX AA&/STRD BRCH PLXS IMG: CPT | Performed by: ORTHOPAEDIC SURGERY

## 2019-02-05 PROCEDURE — 94010 BREATHING CAPACITY TEST: CPT | Performed by: ORTHOPAEDIC SURGERY

## 2019-02-05 RX ORDER — SODIUM CHLORIDE, SODIUM LACTATE, POTASSIUM CHLORIDE, CALCIUM CHLORIDE 600; 310; 30; 20 MG/100ML; MG/100ML; MG/100ML; MG/100ML
INJECTION, SOLUTION INTRAVENOUS CONTINUOUS
Status: DISCONTINUED | OUTPATIENT
Start: 2019-02-05 | End: 2019-02-05

## 2019-02-05 RX ORDER — FAMOTIDINE 20 MG/1
20 TABLET ORAL ONCE
Status: DISCONTINUED | OUTPATIENT
Start: 2019-02-05 | End: 2019-02-05

## 2019-02-05 RX ORDER — ROPIVACAINE HYDROCHLORIDE 5 MG/ML
INJECTION, SOLUTION EPIDURAL; INFILTRATION; PERINEURAL AS NEEDED
Status: DISCONTINUED | OUTPATIENT
Start: 2019-02-05 | End: 2019-02-05 | Stop reason: SURG

## 2019-02-05 RX ORDER — NEOSTIGMINE METHYLSULFATE 0.5 MG/ML
INJECTION INTRAVENOUS AS NEEDED
Status: DISCONTINUED | OUTPATIENT
Start: 2019-02-05 | End: 2019-02-05 | Stop reason: SURG

## 2019-02-05 RX ORDER — DEXAMETHASONE SODIUM PHOSPHATE 4 MG/ML
VIAL (ML) INJECTION AS NEEDED
Status: DISCONTINUED | OUTPATIENT
Start: 2019-02-05 | End: 2019-02-05 | Stop reason: SURG

## 2019-02-05 RX ORDER — EPHEDRINE SULFATE 50 MG/ML
INJECTION, SOLUTION INTRAVENOUS AS NEEDED
Status: DISCONTINUED | OUTPATIENT
Start: 2019-02-05 | End: 2019-02-05 | Stop reason: SURG

## 2019-02-05 RX ORDER — HYDROCODONE BITARTRATE AND ACETAMINOPHEN 5; 325 MG/1; MG/1
2 TABLET ORAL AS NEEDED
Status: DISCONTINUED | OUTPATIENT
Start: 2019-02-05 | End: 2019-02-05

## 2019-02-05 RX ORDER — MORPHINE SULFATE 4 MG/ML
2 INJECTION, SOLUTION INTRAMUSCULAR; INTRAVENOUS EVERY 10 MIN PRN
Status: DISCONTINUED | OUTPATIENT
Start: 2019-02-05 | End: 2019-02-05

## 2019-02-05 RX ORDER — MORPHINE SULFATE 4 MG/ML
4 INJECTION, SOLUTION INTRAMUSCULAR; INTRAVENOUS EVERY 10 MIN PRN
Status: DISCONTINUED | OUTPATIENT
Start: 2019-02-05 | End: 2019-02-05

## 2019-02-05 RX ORDER — GLYCOPYRROLATE 0.2 MG/ML
INJECTION INTRAMUSCULAR; INTRAVENOUS AS NEEDED
Status: DISCONTINUED | OUTPATIENT
Start: 2019-02-05 | End: 2019-02-05 | Stop reason: SURG

## 2019-02-05 RX ORDER — ONDANSETRON 2 MG/ML
INJECTION INTRAMUSCULAR; INTRAVENOUS AS NEEDED
Status: DISCONTINUED | OUTPATIENT
Start: 2019-02-05 | End: 2019-02-05 | Stop reason: SURG

## 2019-02-05 RX ORDER — LIDOCAINE HYDROCHLORIDE 10 MG/ML
INJECTION, SOLUTION EPIDURAL; INFILTRATION; INTRACAUDAL; PERINEURAL AS NEEDED
Status: DISCONTINUED | OUTPATIENT
Start: 2019-02-05 | End: 2019-02-05 | Stop reason: SURG

## 2019-02-05 RX ORDER — MORPHINE SULFATE 10 MG/ML
6 INJECTION, SOLUTION INTRAMUSCULAR; INTRAVENOUS EVERY 10 MIN PRN
Status: DISCONTINUED | OUTPATIENT
Start: 2019-02-05 | End: 2019-02-05

## 2019-02-05 RX ORDER — ONDANSETRON 2 MG/ML
4 INJECTION INTRAMUSCULAR; INTRAVENOUS EVERY 6 HOURS PRN
Status: DISCONTINUED | OUTPATIENT
Start: 2019-02-05 | End: 2019-02-05

## 2019-02-05 RX ORDER — HYDROCODONE BITARTRATE AND ACETAMINOPHEN 10; 325 MG/1; MG/1
1-2 TABLET ORAL EVERY 6 HOURS PRN
Qty: 40 TABLET | Refills: 0 | Status: SHIPPED | OUTPATIENT
Start: 2019-02-05 | End: 2019-02-21 | Stop reason: ALTCHOICE

## 2019-02-05 RX ORDER — BUPIVACAINE HYDROCHLORIDE AND EPINEPHRINE 5; 5 MG/ML; UG/ML
INJECTION, SOLUTION PERINEURAL AS NEEDED
Status: DISCONTINUED | OUTPATIENT
Start: 2019-02-05 | End: 2019-02-05 | Stop reason: HOSPADM

## 2019-02-05 RX ORDER — HYDROCODONE BITARTRATE AND ACETAMINOPHEN 5; 325 MG/1; MG/1
1 TABLET ORAL AS NEEDED
Status: DISCONTINUED | OUTPATIENT
Start: 2019-02-05 | End: 2019-02-05

## 2019-02-05 RX ORDER — CEFAZOLIN SODIUM/WATER 2 G/20 ML
2 SYRINGE (ML) INTRAVENOUS ONCE
Status: DISCONTINUED | OUTPATIENT
Start: 2019-02-05 | End: 2019-02-05 | Stop reason: HOSPADM

## 2019-02-05 RX ORDER — ACETAMINOPHEN 500 MG
1000 TABLET ORAL ONCE
Status: COMPLETED | OUTPATIENT
Start: 2019-02-05 | End: 2019-02-05

## 2019-02-05 RX ORDER — HALOPERIDOL 5 MG/ML
0.25 INJECTION INTRAMUSCULAR ONCE AS NEEDED
Status: DISCONTINUED | OUTPATIENT
Start: 2019-02-05 | End: 2019-02-05

## 2019-02-05 RX ORDER — NALOXONE HYDROCHLORIDE 0.4 MG/ML
80 INJECTION, SOLUTION INTRAMUSCULAR; INTRAVENOUS; SUBCUTANEOUS AS NEEDED
Status: DISCONTINUED | OUTPATIENT
Start: 2019-02-05 | End: 2019-02-05

## 2019-02-05 RX ORDER — DEXAMETHASONE SODIUM PHOSPHATE 10 MG/ML
INJECTION, SOLUTION INTRAMUSCULAR; INTRAVENOUS AS NEEDED
Status: DISCONTINUED | OUTPATIENT
Start: 2019-02-05 | End: 2019-02-05 | Stop reason: SURG

## 2019-02-05 RX ORDER — MIDAZOLAM HYDROCHLORIDE 1 MG/ML
INJECTION INTRAMUSCULAR; INTRAVENOUS AS NEEDED
Status: DISCONTINUED | OUTPATIENT
Start: 2019-02-05 | End: 2019-02-05 | Stop reason: SURG

## 2019-02-05 RX ORDER — ONDANSETRON 2 MG/ML
4 INJECTION INTRAMUSCULAR; INTRAVENOUS ONCE AS NEEDED
Status: DISCONTINUED | OUTPATIENT
Start: 2019-02-05 | End: 2019-02-05

## 2019-02-05 RX ORDER — ROCURONIUM BROMIDE 10 MG/ML
INJECTION, SOLUTION INTRAVENOUS AS NEEDED
Status: DISCONTINUED | OUTPATIENT
Start: 2019-02-05 | End: 2019-02-05 | Stop reason: SURG

## 2019-02-05 RX ADMIN — SODIUM CHLORIDE, SODIUM LACTATE, POTASSIUM CHLORIDE, CALCIUM CHLORIDE: 600; 310; 30; 20 INJECTION, SOLUTION INTRAVENOUS at 14:30:00

## 2019-02-05 RX ADMIN — LIDOCAINE HYDROCHLORIDE 5 ML: 10 INJECTION, SOLUTION EPIDURAL; INFILTRATION; INTRACAUDAL; PERINEURAL at 12:36:00

## 2019-02-05 RX ADMIN — DEXAMETHASONE SODIUM PHOSPHATE 4 MG: 10 INJECTION, SOLUTION INTRAMUSCULAR; INTRAVENOUS at 12:38:00

## 2019-02-05 RX ADMIN — LIDOCAINE HYDROCHLORIDE 50 MG: 10 INJECTION, SOLUTION EPIDURAL; INFILTRATION; INTRACAUDAL; PERINEURAL at 13:12:00

## 2019-02-05 RX ADMIN — ROCURONIUM BROMIDE 40 MG: 10 INJECTION, SOLUTION INTRAVENOUS at 13:12:00

## 2019-02-05 RX ADMIN — GLYCOPYRROLATE 0.6 MG: 0.2 INJECTION INTRAMUSCULAR; INTRAVENOUS at 14:25:00

## 2019-02-05 RX ADMIN — ONDANSETRON 4 MG: 2 INJECTION INTRAMUSCULAR; INTRAVENOUS at 14:24:00

## 2019-02-05 RX ADMIN — DEXAMETHASONE SODIUM PHOSPHATE 4 MG: 4 MG/ML VIAL (ML) INJECTION at 13:27:00

## 2019-02-05 RX ADMIN — SODIUM CHLORIDE, SODIUM LACTATE, POTASSIUM CHLORIDE, CALCIUM CHLORIDE: 600; 310; 30; 20 INJECTION, SOLUTION INTRAVENOUS at 13:10:00

## 2019-02-05 RX ADMIN — EPHEDRINE SULFATE 10 MG: 50 INJECTION, SOLUTION INTRAVENOUS at 13:41:00

## 2019-02-05 RX ADMIN — ROPIVACAINE HYDROCHLORIDE 30 ML: 5 INJECTION, SOLUTION EPIDURAL; INFILTRATION; PERINEURAL at 12:38:00

## 2019-02-05 RX ADMIN — NEOSTIGMINE METHYLSULFATE 4 MG: 0.5 INJECTION INTRAVENOUS at 14:25:00

## 2019-02-05 RX ADMIN — MIDAZOLAM HYDROCHLORIDE 2 MG: 1 INJECTION INTRAMUSCULAR; INTRAVENOUS at 12:34:00

## 2019-02-05 NOTE — BRIEF OP NOTE
Pre-Operative Diagnosis: Complete rotator cuff tear of left shoulder [M75.122]     Post-Operative Diagnosis: Complete rotator cuff tear of left shoulder [M75.122]      Procedure Performed:   Procedure(s):  LEFT SHOULDER ARTHROSCOPIC ROTATOR CUFF REPAIR, BI

## 2019-02-05 NOTE — ANESTHESIA PROCEDURE NOTES
Peripheral Block    Anesthesiologist:  Stanislaw Corona DO  Performed by:   Anesthesiologist  Patient Location:  PACU  Start Time:  2/5/2019 12:33 PM  End Time:  2/5/2019 12:38 PM  Site Identification: ultrasound guided, real time ultrasound guided and IMAGE

## 2019-02-05 NOTE — ANESTHESIA PROCEDURE NOTES
ANESTHESIA INTUBATION  Urgency: elective    Airway not difficult    General Information and Staff    Patient location during procedure: OR  Anesthesiologist: Mariah Nicole, DO  Resident/CRNA: Gissell Ca CRNA  Performed: anesthesiologist and C

## 2019-02-05 NOTE — ANESTHESIA PREPROCEDURE EVALUATION
Anesthesia PreOp Note    HPI:     Leisa Cr is a 60year old female who presents for preoperative consultation requested by:  Kathryn Scott MD    Date of Surgery: 2/5/2019    Procedure(s):  SHOULDER ARTHROSCOPY ROTATOR CUFF REPAIR  In Unknown time   gabapentin 300 MG Oral Cap TAKE 2 CAPSULES BY MOUTH EVERY MORNING AND TAKE 3 CAPSULES BY MOUTH IN THE EVENING Disp: 450 capsule Rfl: 0 2/1/2019 at Unknown time   Losartan Potassium 50 MG Oral Tab TAKE 1 TABLET (50 MG TOTAL) BY MOUTH ONCE VERONICA OTHER (SEE COMMENTS)    Comment:Nasal and throat congestion  Seasonal                    Family History   Problem Relation Age of Onset   • Diabetes Mother    • Diabetes Brother      Social History    Socioeconomic History      Marital status:  full  Dental - normal exam     Pulmonary - negative ROS and normal exam   Cardiovascular - normal exam    Neuro/Psych      GI/Hepatic/Renal      Endo/Other - negative ROS   Abdominal              Anesthesia Plan:   ASA:  2  Plan:   General  Post-op Pain Ma

## 2019-02-05 NOTE — ANESTHESIA POSTPROCEDURE EVALUATION
Patient: Lino     Procedure Summary     Date:  02/05/19 Room / Location:  [address] [place] [address] MAIN OR  / [address] [place] [address] MAIN OR    Anesthesia Start:  4091 Anesthesia Stop:      Procedure:  SHOULDER ARTHROSCOPY ROTATOR CUFF REPAIR (Left ) Diagnosis:       Comple

## 2019-02-05 NOTE — H&P
Chief Complaint: left shoulder pain     History of present illness:   This 60year old female presents for rotator cuff repair because of MRI findings, lack of response to conservative measures, and my clinical judgement.     HISTORY:       Past Medical His Myelopathic signs none none   Apprehension none none              Radiographs and Imaging: MRI was reviewed showing a partial-thickness articular sided tear of the rotator cuff        Assessment: 60year old female with left shoulder rotator cuff tear

## 2019-02-05 NOTE — OPERATIVE REPORT
[facility]    PATIENT'S NAME: ANSELMO Tate   ATTENDING PHYSICIAN: Rosalinda Muñoz MD   OPERATING PHYSICIAN: Rosalinda Muñoz MD   PATIENT ACCOUNT#:   [id number]    LOCATION:  [place] [address]  MEDICAL RECORD #:   [id number] Significant degeneration of the long head of the biceps tendon to the supraglenoid tubercle attachment. U-shaped tear of the supraspinatus anterior two-thirds with minimal to no retraction. No significant tearing of the subscapularis.   No significant gle tubercle using the radiofrequency wand. We lightly debrided the rotator cuff from the undersurface. We then redirected our instrumentation into the subacromial space. Bursectomy was performed. Tear was located and debrided further.   It was U-shaped in

## 2019-02-18 ENCOUNTER — TELEPHONE (OUTPATIENT)
Dept: ORTHOPEDICS CLINIC | Facility: CLINIC | Age: 59
End: 2019-02-18

## 2019-02-18 NOTE — TELEPHONE ENCOUNTER
Pt c/o of Left shoulder pain rates pain 7/10 s/p sx on 2/5/19.  Pt states has 2 Tab left on Norco. Pt would like to know if rx can be filled, or if she is switch back to Tramadol, she was informed to stop rx once provided with Norco. Pt states Tylenol and I

## 2019-02-19 RX ORDER — GABAPENTIN 300 MG/1
CAPSULE ORAL
Qty: 450 CAPSULE | Refills: 0 | Status: SHIPPED | OUTPATIENT
Start: 2019-02-19 | End: 2019-05-14

## 2019-02-19 NOTE — TELEPHONE ENCOUNTER
She can switch to tramadol. If she needs a refill, ok to provide.   Tramadol 50mg po Q6h prn pain, #40, 0 refill

## 2019-02-21 ENCOUNTER — OFFICE VISIT (OUTPATIENT)
Dept: ORTHOPEDICS CLINIC | Facility: CLINIC | Age: 59
End: 2019-02-21
Payer: MEDICARE

## 2019-02-21 DIAGNOSIS — M75.122 COMPLETE ROTATOR CUFF TEAR OF LEFT SHOULDER: Primary | ICD-10-CM

## 2019-02-21 PROCEDURE — G0463 HOSPITAL OUTPT CLINIC VISIT: HCPCS | Performed by: ORTHOPAEDIC SURGERY

## 2019-02-21 PROCEDURE — 99024 POSTOP FOLLOW-UP VISIT: CPT | Performed by: ORTHOPAEDIC SURGERY

## 2019-02-21 RX ORDER — HYDROCODONE BITARTRATE AND ACETAMINOPHEN 5; 325 MG/1; MG/1
1 TABLET ORAL EVERY 6 HOURS PRN
Qty: 30 TABLET | Refills: 0 | Status: SHIPPED | OUTPATIENT
Start: 2019-02-21 | End: 2019-04-05

## 2019-02-21 RX ORDER — TRAMADOL HYDROCHLORIDE 50 MG/1
TABLET ORAL
COMMUNITY
Start: 2019-02-19 | End: 2019-04-05

## 2019-02-21 NOTE — PROGRESS NOTES
NURSING INTAKE COMMENTS: Patient presents with:  Post-Op: 1st post op f/u on left shoulder surgery. Stts she notice swelling in the left shoulder more in the afternoon. Also there's pain and tingling in the left hand.   Was given a script for tramadol but 1 TABLET (50 MG TOTAL) BY MOUTH ONCE DAILY. Disp: 90 tablet Rfl: 4   simvastatin 20 MG Oral Tab Take 1 tablet (20 mg total) by mouth nightly.  Disp: 90 tablet Rfl: 2   Zolpidem Tartrate 10 MG Oral Tab TAKE 1 TABLET BY MOUTH NIGHTLY AT BEDTIME AS NEEDED FOR stairs. Physical examination reveals well healed incisions. Range of motion is appropriate. The operative extremity is neurovascularly intact with no focal deficits. Patient denies any calf pain.     Imaging: none    [place] was seen today for po

## 2019-03-07 ENCOUNTER — OFFICE VISIT (OUTPATIENT)
Dept: PHYSICAL THERAPY | Age: 59
End: 2019-03-07
Attending: ORTHOPAEDIC SURGERY
Payer: MEDICARE

## 2019-03-07 DIAGNOSIS — M75.122 COMPLETE ROTATOR CUFF TEAR OF LEFT SHOULDER: ICD-10-CM

## 2019-03-07 PROCEDURE — 97110 THERAPEUTIC EXERCISES: CPT | Performed by: PHYSICAL THERAPIST

## 2019-03-07 PROCEDURE — 97162 PT EVAL MOD COMPLEX 30 MIN: CPT | Performed by: PHYSICAL THERAPIST

## 2019-03-07 NOTE — PROGRESS NOTES
SHOULDER EVALUATION:   Referring Physician: Dr. Lito Benavides  Diagnosis: Complete rotator cuff tear of left shoulder (M75.122)  Onset: 2/5/19    Evaluation Date: 3/7/2019  Visit # 1  Scheduled Visits 8  Insurance Authorized visits 10 medicare     PATIENT S ADLs    Precautions: see protocol on order   OBJECTIVE:   Observation/Posture: poor B rounded shoulders and forward head, slouched sitting (pt unable to sit for long periods of time, pt stood up after 3-5 minutes during the evaluation  C-spine AROM  Flex10 education; Home exercise program instruction; modalities prn    Education or treatment limitation: None  Rehab Potential: good    FOTO: 96% limitation    Patient was advised of these findings, precautions, and treatment options and has agreed to actively p

## 2019-03-19 ENCOUNTER — OFFICE VISIT (OUTPATIENT)
Dept: PHYSICAL THERAPY | Age: 59
End: 2019-03-19
Attending: ORTHOPAEDIC SURGERY
Payer: MEDICARE

## 2019-03-19 DIAGNOSIS — M75.122 COMPLETE ROTATOR CUFF TEAR OF LEFT SHOULDER: ICD-10-CM

## 2019-03-19 PROCEDURE — 97110 THERAPEUTIC EXERCISES: CPT | Performed by: PHYSICAL THERAPIST

## 2019-03-19 NOTE — PROGRESS NOTES
Dx: Complete rotator cuff tear of left shoulder (Y24.100)         Visit # 2  Fall Risk: standard     Scheduled Visits 8  Precautions: See below for protocol   Insurance Authorized visits      10 medicare    Next MD visit: none scheduled  Evaluation Date 3/ IN THE FIRST 4-6 WEEKS  1. Sling for everyone 4 weeks for small/medium tears and 6 week for large tears   2. Cryotherapy prn   3. Pendulum exercises   4. Wrist/elbow ROM exercises   5.  exercises   6. Scapular exercises   7.  Day 10-14 suture removal IR/ER (0, 45, 90 degrees abduction) and rhythmic stabilization (flex, ext, hor abd/add) at 45, 60, 90, 120 degrees elevation in scapular plane   - AROM progressing to light manual resistance for PNF patterns   - Aquatic therapy- increases speed of movement

## 2019-03-21 ENCOUNTER — OFFICE VISIT (OUTPATIENT)
Dept: PHYSICAL THERAPY | Age: 59
End: 2019-03-21
Attending: ORTHOPAEDIC SURGERY
Payer: MEDICARE

## 2019-03-21 DIAGNOSIS — M75.122 COMPLETE ROTATOR CUFF TEAR OF LEFT SHOULDER: ICD-10-CM

## 2019-03-21 PROCEDURE — 97110 THERAPEUTIC EXERCISES: CPT | Performed by: PHYSICAL THERAPIST

## 2019-03-21 NOTE — PROGRESS NOTES
Dx: Complete rotator cuff tear of left shoulder (M39.056)         Visit # 3  Fall Risk: standard     Scheduled Visits 8  Precautions: See below for protocol   Insurance Authorized visits      10 medicare    Next MD visit: none scheduled  Evaluation Date 3/ tenotomy is performed, active elbow flexion is delayed until pain is resolved in the bicep, often by week 5-6.      Phase 1(0-4weeks)   Begins immediately post-op through approximately 4-6 weeks. THIS CAN MOSTLY BE ACHIEVED AT HOME.   PREFER \"UNDERDOING\" mobilization/passive ROM- (target- achieve full ROM by 12 weeks   2. AROM elevation/scaption in standing (must be performed in ROM that allows for good biomechanics; use mirror for feedback)   3.  Strengthening   - Continue manual resistance- rhythmic stabi tennis. ..)   2. Interval sport programs as indicated   3. Plyometrics with pitchback   4.  Advanced strengthening as indicated      Return to Activity Guidelines     Golf 12 weeks (chip & putt) 5-6 months (progress to full swing)   Ski 5-6 months   Tennis 6

## 2019-03-26 ENCOUNTER — OFFICE VISIT (OUTPATIENT)
Dept: PHYSICAL THERAPY | Age: 59
End: 2019-03-26
Attending: ORTHOPAEDIC SURGERY
Payer: MEDICARE

## 2019-03-26 DIAGNOSIS — M75.122 COMPLETE ROTATOR CUFF TEAR OF LEFT SHOULDER: ICD-10-CM

## 2019-03-26 PROCEDURE — 97110 THERAPEUTIC EXERCISES: CPT | Performed by: PHYSICAL THERAPIST

## 2019-03-26 NOTE — PROGRESS NOTES
Dx: Complete rotator cuff tear of left shoulder (E70.400)         Visit # 4  Fall Risk: standard     Scheduled Visits 8  Precautions: See below for protocol   Insurance Authorized visits      10 medicare    Next MD visit: none scheduled  Evaluation Date 3/ until pain is resolved in the bicep, often by week 5-6.      Phase 1(0-4weeks)   Begins immediately post-op through approximately 4-6 weeks. THIS CAN MOSTLY BE ACHIEVED AT HOME. PREFER \"UNDERDOING\" THERAPY IN THE FIRST 4-6 WEEKS  1.  Sling for everyone 2. AROM elevation/scaption in standing (must be performed in ROM that allows for good biomechanics; use mirror for feedback)   3.  Strengthening   - Continue manual resistance- rhythmic stabilization for IR/ER (0, 45, 90 degrees abduction) and rhythmic st with pitchback   4.  Advanced strengthening as indicated      Return to Activity Guidelines     Golf 12 weeks (chip & putt) 5-6 months (progress to full swing)   Ski 5-6 months   Tennis 6-8 months

## 2019-03-28 ENCOUNTER — OFFICE VISIT (OUTPATIENT)
Dept: PHYSICAL THERAPY | Age: 59
End: 2019-03-28
Attending: ORTHOPAEDIC SURGERY
Payer: MEDICARE

## 2019-03-28 DIAGNOSIS — M75.122 COMPLETE ROTATOR CUFF TEAR OF LEFT SHOULDER: ICD-10-CM

## 2019-03-28 PROCEDURE — 97110 THERAPEUTIC EXERCISES: CPT | Performed by: PHYSICAL THERAPIST

## 2019-04-02 ENCOUNTER — NURSE TRIAGE (OUTPATIENT)
Dept: OTHER | Age: 59
End: 2019-04-02

## 2019-04-02 ENCOUNTER — OFFICE VISIT (OUTPATIENT)
Dept: PHYSICAL THERAPY | Age: 59
End: 2019-04-02
Attending: ORTHOPAEDIC SURGERY
Payer: MEDICARE

## 2019-04-02 DIAGNOSIS — M75.122 COMPLETE ROTATOR CUFF TEAR OF LEFT SHOULDER: ICD-10-CM

## 2019-04-02 PROCEDURE — 97110 THERAPEUTIC EXERCISES: CPT

## 2019-04-02 NOTE — TELEPHONE ENCOUNTER
With French interpretor, patient stated that has had ongoing pain in her lower-mid back, bilateral hips, and bilateral legs. Patient taking the risedronate for osteopenia, but not helping at all, only having more pain. Pain level currently 9/10.  Patient i

## 2019-04-02 NOTE — PROGRESS NOTES
Dx: Complete rotator cuff tear of left shoulder (M26.513)         Visit # 6  Fall Risk: standard     Scheduled Visits 8  Precautions: See below for protocol   Insurance Authorized visits      10 medicare    Next MD visit: none scheduled  Evaluation Date 3/ order to sit with good posture and carry groceries              Plan: Cont PT per plan of care    Charges: 3 therex       Total Timed Treatment: 40 min  Total Treatment Time: 40 min    PROTOCOL  If a biceps tenodesis is performed in addition to a rotator c strengthening as appropriate (prone rowing, prone shoulder extension)      Home Exercise Program   1. Stretching for full ROM in all directions   2. Active exercise as directed by physical therapist   3.  Cryotherapy prn      Phase 3:   8-12 weeks post-op ( isokinetics for IR/ER beginning in a modified position with moderate speeds (120o-240o)      Home Exercise Program   1. Stretching to maintain ROM as needed   2. Strengthening as directed by PT.  Pt should have independent strengthening program prior to dis

## 2019-04-03 NOTE — TELEPHONE ENCOUNTER
Advised patient of Dr. Mcghee Nurse note. Patient verbalized understanding. Appointment made for 4/5/19 at 12pm with Dr Cami Lewis in [address] [place] [address].

## 2019-04-03 NOTE — TELEPHONE ENCOUNTER
Risedronate is not a medication for pain. If pain is sever she needs to go to the ER, otherwise need to make an appt.

## 2019-04-04 ENCOUNTER — OFFICE VISIT (OUTPATIENT)
Dept: PHYSICAL THERAPY | Age: 59
End: 2019-04-04
Attending: ORTHOPAEDIC SURGERY
Payer: MEDICARE

## 2019-04-04 DIAGNOSIS — M75.122 COMPLETE ROTATOR CUFF TEAR OF LEFT SHOULDER: ICD-10-CM

## 2019-04-04 PROCEDURE — 97110 THERAPEUTIC EXERCISES: CPT

## 2019-04-04 NOTE — PROGRESS NOTES
Dx: Complete rotator cuff tear of left shoulder (Y77.056)         Visit # 7  Fall Risk: standard     Scheduled Visits 8  Precautions: See below for protocol   Insurance Authorized visits      10 medicare    Next MD visit: none scheduled  Evaluation Date 3/ exhibit L shoulder AROM to [facility] with pain < 2/10 in order to reach into a cabinet to get a plate out  3. Pt to exhibit L shoulder/elbow strength to at least 4+/5 consistently in order for pt to put soup can into cabinet  4.  Pt to exhibit L scap strength of joint at multiple angles in supine (60o, 90o, 120o)   - AAROM progressing to AROM for elevation in supine. Elevate head of bed as appropriate maintaining good mechanics.    - AAROM progressing to AROM PNF D1/D2 diagonals in supine   - ER in sidelying   - Farhat Saldaña PROM as needed if FROM not yet achieved   2. Progress strengthening exercises in phase 3 with increasing weight as tolerated   3.  Add gym machines as appropriate (chest press, rowing, latissimus pulldown, triceps, biceps) and IR/ER at 90 degree abduction

## 2019-04-05 ENCOUNTER — OFFICE VISIT (OUTPATIENT)
Dept: FAMILY MEDICINE CLINIC | Facility: CLINIC | Age: 59
End: 2019-04-05
Payer: MEDICARE

## 2019-04-05 VITALS
DIASTOLIC BLOOD PRESSURE: 80 MMHG | WEIGHT: 128 LBS | HEIGHT: 59 IN | TEMPERATURE: 98 F | HEART RATE: 63 BPM | SYSTOLIC BLOOD PRESSURE: 128 MMHG | BODY MASS INDEX: 25.8 KG/M2

## 2019-04-05 DIAGNOSIS — M48.061 SPINAL STENOSIS OF LUMBAR REGION WITHOUT NEUROGENIC CLAUDICATION: ICD-10-CM

## 2019-04-05 DIAGNOSIS — M85.89 OSTEOPENIA OF MULTIPLE SITES: Primary | ICD-10-CM

## 2019-04-05 DIAGNOSIS — M75.102 TEAR OF LEFT ROTATOR CUFF, UNSPECIFIED TEAR EXTENT, UNSPECIFIED WHETHER TRAUMATIC: ICD-10-CM

## 2019-04-05 PROCEDURE — G0463 HOSPITAL OUTPT CLINIC VISIT: HCPCS | Performed by: FAMILY MEDICINE

## 2019-04-05 PROCEDURE — 99213 OFFICE O/P EST LOW 20 MIN: CPT | Performed by: FAMILY MEDICINE

## 2019-04-05 RX ORDER — TRAMADOL HYDROCHLORIDE 50 MG/1
50 TABLET ORAL EVERY 8 HOURS PRN
Qty: 60 TABLET | Refills: 2 | Status: SHIPPED | OUTPATIENT
Start: 2019-04-05 | End: 2019-09-10

## 2019-04-05 RX ORDER — LOSARTAN POTASSIUM 50 MG/1
TABLET ORAL
Qty: 90 TABLET | Refills: 4 | Status: SHIPPED | OUTPATIENT
Start: 2019-04-05 | End: 2019-09-10

## 2019-04-05 RX ORDER — ALENDRONATE SODIUM 70 MG/1
70 TABLET ORAL WEEKLY
Qty: 12 TABLET | Refills: 1 | Status: SHIPPED | OUTPATIENT
Start: 2019-04-05 | End: 2019-09-10

## 2019-04-05 NOTE — PROGRESS NOTES
4/5/2019  12:42 PM    Pavel Wilkins is a 60year old female.     Chief complaint(s): Patient presents with:  Medication Problem: requesting changes to her osteopenia medication  Low Back Pain: mid back pain that radiates down her lower back to h Diabetes Brother       Social History: Social History    Tobacco Use      Smoking status: Never Smoker      Smokeless tobacco: Never Used    Alcohol use: No      Alcohol/week: 0.0 oz    Drug use: No       Immunizations:     Immunization History  Administer External Cream Apply to affected area(s) BID Disp: 30 g Rfl: 0       Allergies:    Seafood                 HIVES  Pollen                  OTHER (SEE COMMENTS)    Comment:Nasal and throat congestion  Seasonal                      ROS:   Review of Systems Take 1 tablet (70 mg total) by mouth once a week., Disp: 12 tablet, Rfl: 1  •  traMADol HCl 50 MG Oral Tab, Take 1 tablet (50 mg total) by mouth every 8 (eight) hours as needed for Pain., Disp: 60 tablet, Rfl: 2  •  Losartan Potassium 50 MG Oral Tab, TAKE traumatic    MEDICATIONS:   CPM    REFERRALS: PT and Orthopedic     RECOMMENDATIONS given include: Please, call our office with any questions or concerns.  Notify Dr Tera Rodriguez or the [address] [place], [place] [address] if there is a deterioration or worsening of the medical con

## 2019-04-09 ENCOUNTER — OFFICE VISIT (OUTPATIENT)
Dept: PHYSICAL THERAPY | Age: 59
End: 2019-04-09
Attending: ORTHOPAEDIC SURGERY
Payer: MEDICARE

## 2019-04-09 DIAGNOSIS — M75.122 COMPLETE ROTATOR CUFF TEAR OF LEFT SHOULDER: ICD-10-CM

## 2019-04-09 PROCEDURE — 97110 THERAPEUTIC EXERCISES: CPT

## 2019-04-09 NOTE — PROGRESS NOTES
Dx: Complete rotator cuff tear of left shoulder (A30.581)         Visit # 8  Fall Risk: standard     Scheduled Visits 8  Precautions: See below for protocol   Insurance Authorized visits      10 medicare    Next MD visit: none scheduled  Evaluation Date 3/ Pt will be independent with progressive HEP, its' progression and management of symptoms  2. Pt to exhibit L shoulder AROM to [facility] with pain < 2/10 in order to reach into a cabinet to get a plate out  3.  Pt to exhibit L shoulder/elbow strength to at least 4 arm supported as needed   - Minimal manual resistance for rhythmic stabilization of glenohumeral joint at multiple angles in supine (60o, 90o, 120o)   - AAROM progressing to AROM for elevation in supine.  Elevate head of bed as appropriate maintaining good by PT      Phase 4:   (12-16 weeks)   1. Joint mobilization (glenohumeral/scapulothoracic) and PROM as needed if FROM not yet achieved   2. Progress strengthening exercises in phase 3 with increasing weight as tolerated   3.  Add gym machines as appropriate

## 2019-04-11 ENCOUNTER — OFFICE VISIT (OUTPATIENT)
Dept: PHYSICAL THERAPY | Age: 59
End: 2019-04-11
Attending: ORTHOPAEDIC SURGERY
Payer: MEDICARE

## 2019-04-11 PROCEDURE — 97110 THERAPEUTIC EXERCISES: CPT

## 2019-04-11 NOTE — PROGRESS NOTES
Dx: Complete rotator cuff tear of left shoulder (M75.122)     2/5/19    Visit # 9  Fall Risk: standard     Scheduled Visits 8  Precautions: See below for protocol   Insurance Authorized visits      10 medicare    Next MD visit: none scheduled  Evaluation D PROM.     HEP:  Added table slides and SB rolls (if ball available). Handout issued. Goals     • Therapy Goals      1. Pt will be independent with progressive HEP, its' progression and management of symptoms  2.  Pt to exhibit L shoulder AROM to [facility] of movement as tolerated.    4. Active ROM/Initial Strengthening   - Minimal manual resistance for isometric ER/IR at 0o, 45o, and 90o in supine with arm supported as needed   - Minimal manual resistance for rhythmic stabilization of glenohumeral joint at m Progress to Upper Body Ergometer (low resistance) Home Exercise Program   1. Passive stretching for FROM   2. Light strengthening exercises as directed by PT      Phase 4:   (12-16 weeks)   1.  Joint mobilization (glenohumeral/scapulothoracic) and PROM as n

## 2019-04-15 ENCOUNTER — OFFICE VISIT (OUTPATIENT)
Dept: PHYSICAL THERAPY | Age: 59
End: 2019-04-15
Attending: ORTHOPAEDIC SURGERY
Payer: MEDICARE

## 2019-04-15 PROCEDURE — 97110 THERAPEUTIC EXERCISES: CPT

## 2019-04-15 NOTE — PROGRESS NOTES
Patient Name: [place], [place]: 1/25/1960, MRN: [id number]   Date:  4/15/2019  Referring Physician:  Edie Maynard    Diagnosis: Complete rotator cuff tear of left shoulder (M75.122)      Progress Summary    Pt has attended 10 visits 55    ShoulderStrength:  Flexion: R: 4 ; L: 2+/5  Abduction: R: 4/4; L: 2+/5  Extension: R: 5/5; L: 3/5  ER: R: 4/5; L: 2/5  IR: R: 4+/5; L: 4-/5     Today's Treatment:  UBE lvl 0 3mins fwd, 3mins bwd  Re-assessment testing  Supine PROM all planes  Supine services furnished under this plan of treatment and while under my care.     X___________________________________________________ Date____________________    Certification From: 5/59/6981  To:7/14/2019

## 2019-04-18 ENCOUNTER — OFFICE VISIT (OUTPATIENT)
Dept: PHYSICAL THERAPY | Age: 59
End: 2019-04-18
Attending: ORTHOPAEDIC SURGERY
Payer: MEDICARE

## 2019-04-18 PROCEDURE — 97110 THERAPEUTIC EXERCISES: CPT

## 2019-04-18 NOTE — PROGRESS NOTES
Dx: Complete rotator cuff tear of left shoulder (M75.122)     2/5/19    Visit # 11  Fall Risk: standard     Scheduled Visits 25  Precautions: See below for protocol   Insurance Authorized visits      10 medicare    Next MD visit: none scheduled  Evaluation into a cabinet to get a plate out  3. Pt to exhibit L shoulder/elbow strength to at least 4+/5 consistently in order for pt to put soup can into cabinet  4.  Pt to exhibit L scap strength of at least 4/5 in order to sit with good posture and carry groceries (60o, 90o, 120o)   - AAROM progressing to AROM for elevation in supine. Elevate head of bed as appropriate maintaining good mechanics.    - AAROM progressing to AROM PNF D1/D2 diagonals in supine   - ER in sidelying   - Light periscapular strengthening as a achieved   2. Progress strengthening exercises in phase 3 with increasing weight as tolerated   3. Add gym machines as appropriate (chest press, rowing, latissimus pulldown, triceps, biceps) and IR/ER at 90 degree abduction   4.  May start isokinetics for I

## 2019-04-25 ENCOUNTER — OFFICE VISIT (OUTPATIENT)
Dept: PHYSICAL THERAPY | Age: 59
End: 2019-04-25
Attending: ORTHOPAEDIC SURGERY
Payer: MEDICARE

## 2019-04-25 PROCEDURE — 97110 THERAPEUTIC EXERCISES: CPT

## 2019-04-25 NOTE — PROGRESS NOTES
Dx: Complete rotator cuff tear of left shoulder (M75.122)     2/5/19    Visit # 12  Fall Risk: standard     Scheduled Visits 25  Precautions: See below for protocol   Insurance Authorized visits      20 medicare    Next MD visit: 5/1/19 Evaluation Date 5/1 to exhibit L scap strength of at least 4/5 in order to sit with good posture and carry groceries              Plan: Continue with ROM work, progressing AROM as tolerated.      Charges: 3 therex       Total Timed Treatment: 45 min  Total Treatment Time: 50 m IR/ER at 90 degree abduction   4. May start isokinetics for IR/ER beginning in a modified position with moderate speeds (120o-240o)      Home Exercise Program   1. Stretching to maintain ROM as needed   2. Strengthening as directed by PT.  Pt should have in

## 2019-04-29 ENCOUNTER — OFFICE VISIT (OUTPATIENT)
Dept: PHYSICAL THERAPY | Age: 59
End: 2019-04-29
Attending: ORTHOPAEDIC SURGERY
Payer: MEDICARE

## 2019-04-29 PROCEDURE — 97110 THERAPEUTIC EXERCISES: CPT

## 2019-04-29 PROCEDURE — 97140 MANUAL THERAPY 1/> REGIONS: CPT

## 2019-04-29 NOTE — PROGRESS NOTES
Patient Name: [place], [place]: 1/25/1960, MRN: [id number]   Date:  4/29/2019  Referring Physician:  Estrella Nobles    Diagnosis: Complete rotator cuff tear of left shoulder (M75.122)    Progress Summary    Pt has attended 14 visits in [id number]  Standing wall slides flexion with RUE assist to full elevation 10x      Goals     • Therapy Goals      1. Pt will be independent with progressive HEP, its' progression and management of symptoms - patient compliant  2.  Pt to exhibit L shoulder JONAH

## 2019-05-01 ENCOUNTER — OFFICE VISIT (OUTPATIENT)
Dept: ORTHOPEDICS CLINIC | Facility: CLINIC | Age: 59
End: 2019-05-01
Payer: MEDICARE

## 2019-05-01 DIAGNOSIS — M75.122 COMPLETE TEAR OF LEFT ROTATOR CUFF, UNSPECIFIED WHETHER TRAUMATIC: Primary | ICD-10-CM

## 2019-05-01 PROCEDURE — 99024 POSTOP FOLLOW-UP VISIT: CPT | Performed by: ORTHOPAEDIC SURGERY

## 2019-05-01 PROCEDURE — G0463 HOSPITAL OUTPT CLINIC VISIT: HCPCS | Performed by: ORTHOPAEDIC SURGERY

## 2019-05-01 RX ORDER — ERGOCALCIFEROL 1.25 MG/1
CAPSULE ORAL
Refills: 4 | COMMUNITY
Start: 2019-03-02

## 2019-05-01 NOTE — PROGRESS NOTES
NURSING INTAKE COMMENTS: Patient presents with:  Post-Op: s/p left shoulder pain -- Rates pain 5/10. Went to PT. Utilized [employer] [place] interpretor ID # [id number], Salvador Lincoln.        Patient presents 2 months status post left shoulder arthroscopic rotator c DAY Disp: 90 capsule Rfl: 1   simvastatin 20 MG Oral Tab Take 1 tablet (20 mg total) by mouth nightly.  Disp: 90 tablet Rfl: 2   Zolpidem Tartrate 10 MG Oral Tab TAKE 1 TABLET BY MOUTH NIGHTLY AT BEDTIME AS NEEDED FOR SLEEP Disp: 90 tablet Rfl: 1   PAROXETI operative extremity is neurovascularly intact with no focal deficits. Imaging: none today    [place] was seen today for post-op.     Diagnoses and all orders for this visit:    Complete tear of left rotator cuff, unspecified whether traumatic        Pl

## 2019-05-02 ENCOUNTER — OFFICE VISIT (OUTPATIENT)
Dept: PHYSICAL THERAPY | Age: 59
End: 2019-05-02
Attending: ORTHOPAEDIC SURGERY
Payer: MEDICARE

## 2019-05-02 PROCEDURE — 97110 THERAPEUTIC EXERCISES: CPT

## 2019-05-02 NOTE — PROGRESS NOTES
Dx: Complete rotator cuff tear of left shoulder (M75.122)     2/5/19  Fall Risk: standard      Precautions: See below for protocol   Insurance Authorized visits      24 medicare    Next MD visit: 5/1/19 Evaluation Date 5/1/19  Medication Changes since last plate out  3. Pt to exhibit L shoulder/elbow strength to at least 4+/5 consistently in order for pt to put soup can into cabinet  4.  Pt to exhibit L scap strength of at least 4/5 in order to sit with good posture and carry groceries              Plan: Cont

## 2019-05-07 ENCOUNTER — OFFICE VISIT (OUTPATIENT)
Dept: PHYSICAL THERAPY | Age: 59
End: 2019-05-07
Attending: ORTHOPAEDIC SURGERY
Payer: MEDICARE

## 2019-05-07 PROCEDURE — 97110 THERAPEUTIC EXERCISES: CPT

## 2019-05-07 NOTE — PROGRESS NOTES
Dx: Complete rotator cuff tear of left shoulder (M75.122)     2/5/19  Fall Risk: standard      Precautions: See below for protocol   Insurance Authorized visits      24 medicare    Next MD visit: July Evaluation Date 5/1/19  Medication Changes since last v strength to at least 4+/5 consistently in order for pt to put soup can into cabinet  4.  Pt to exhibit L scap strength of at least 4/5 in order to sit with good posture and carry groceries              Plan: Continue to progress ROM and strengthening as maren

## 2019-05-09 ENCOUNTER — OFFICE VISIT (OUTPATIENT)
Dept: PHYSICAL THERAPY | Age: 59
End: 2019-05-09
Attending: ORTHOPAEDIC SURGERY
Payer: MEDICARE

## 2019-05-09 PROCEDURE — 97110 THERAPEUTIC EXERCISES: CPT

## 2019-05-09 NOTE — PROGRESS NOTES
Dx: Complete rotator cuff tear of left shoulder (M75.122)     2/5/19  Fall Risk: standard      Precautions: See below for protocol   Insurance Authorized visits      24 medicare    Next MD visit: July Evaluation Date 5/1/19  Medication Changes since last v to put soup can into cabinet  4. Pt to exhibit L scap strength of at least 4/5 in order to sit with good posture and carry groceries              Plan: Continue to progress ROM and strengthening as tolerated. Charges:  TherEx x3       Total Timed Treatme

## 2019-05-13 ENCOUNTER — OFFICE VISIT (OUTPATIENT)
Dept: PHYSICAL THERAPY | Age: 59
End: 2019-05-13
Attending: ORTHOPAEDIC SURGERY
Payer: MEDICARE

## 2019-05-13 PROCEDURE — 97110 THERAPEUTIC EXERCISES: CPT

## 2019-05-13 NOTE — PROGRESS NOTES
Dx: Complete rotator cuff tear of left shoulder (M75.122)     2/5/19  Fall Risk: standard      Precautions: See below for protocol   Insurance Authorized visits      24 medicare    Next MD visit: July Evaluation Date 5/1/19  Medication Changes since last v progression and management of symptoms  2. Pt to exhibit L shoulder AROM to [facility] with pain < 2/10 in order to reach into a cabinet to get a plate out  3.  Pt to exhibit L shoulder/elbow strength to at least 4+/5 consistently in order for pt to put soup can i

## 2019-05-14 RX ORDER — GABAPENTIN 300 MG/1
CAPSULE ORAL
Qty: 450 CAPSULE | Refills: 0 | Status: SHIPPED | OUTPATIENT
Start: 2019-05-14 | End: 2019-08-09

## 2019-05-16 ENCOUNTER — OFFICE VISIT (OUTPATIENT)
Dept: PHYSICAL THERAPY | Age: 59
End: 2019-05-16
Attending: ORTHOPAEDIC SURGERY
Payer: MEDICARE

## 2019-05-16 PROCEDURE — 97110 THERAPEUTIC EXERCISES: CPT

## 2019-05-16 NOTE — PROGRESS NOTES
Dx: Complete rotator cuff tear of left shoulder (M75.122)     2/5/19  Fall Risk: standard      Precautions: See below for protocol   Insurance Authorized visits      24 medicare    Next MD visit: July Evaluation Date 5/1/19  Medication Changes since last v and horiz abd, and to RTB for IR/ER this visit. Min cueing required for proper positioning, however pt able to perform all repetitions without pain.     HEP:  Progressed to RTB for ER/IR, added shoulder flex and abd with YTB    Goals     • Therapy Goals months

## 2019-05-20 ENCOUNTER — OFFICE VISIT (OUTPATIENT)
Dept: PHYSICAL THERAPY | Age: 59
End: 2019-05-20
Attending: ORTHOPAEDIC SURGERY
Payer: MEDICARE

## 2019-05-20 PROCEDURE — 97110 THERAPEUTIC EXERCISES: CPT

## 2019-05-20 NOTE — PROGRESS NOTES
Dx: Complete rotator cuff tear of left shoulder (M75.122)     2/5/19  Fall Risk: standard      Precautions: See below for protocol   Insurance Authorized visits      24 medicare    Next MD visit: July Evaluation Date 5/1/19  Medication Changes since last v following (111 flexion, 93 abd) with pt reporting decreased pain. Prolonged time spent with compensatory patterns may contribute to restrictions and impact pt's ROM. HEP:  Instructed pt on self release with sheet and tennis ball and butterfly stretch. putt) 5-6 months (progress to full swing)   Ski 5-6 months   Tennis 6-8 months

## 2019-05-23 ENCOUNTER — OFFICE VISIT (OUTPATIENT)
Dept: PHYSICAL THERAPY | Age: 59
End: 2019-05-23
Attending: ORTHOPAEDIC SURGERY
Payer: MEDICARE

## 2019-05-23 PROCEDURE — 97110 THERAPEUTIC EXERCISES: CPT

## 2019-05-23 NOTE — PROGRESS NOTES
Dx: Complete rotator cuff tear of left shoulder (M75.122)     2/5/19  Fall Risk: standard      Precautions: See below for protocol   Insurance Authorized visits      24 medicare    Next MD visit: July Evaluation Date 5/1/19  Medication Changes since last v moderately fatigued, but able to tolerate progressions. Notable improvement in shoulder AAROM with wall slides into both flexion and abduction. AROM minimally improved with flexion, abduction and IR. Initiated CKC strengthening with modified push up.  Pt re with pitchback   4.  Advanced strengthening as indicated      Return to Activity Guidelines     Golf 12 weeks (chip & putt) 5-6 months (progress to full swing)   Ski 5-6 months   Tennis 6-8 months

## 2019-05-28 ENCOUNTER — APPOINTMENT (OUTPATIENT)
Dept: PHYSICAL THERAPY | Age: 59
End: 2019-05-28
Attending: ORTHOPAEDIC SURGERY
Payer: MEDICARE

## 2019-05-28 DIAGNOSIS — G89.29 INSOMNIA SECONDARY TO CHRONIC PAIN: ICD-10-CM

## 2019-05-28 DIAGNOSIS — G47.01 INSOMNIA SECONDARY TO CHRONIC PAIN: ICD-10-CM

## 2019-05-29 DIAGNOSIS — G47.01 INSOMNIA SECONDARY TO CHRONIC PAIN: ICD-10-CM

## 2019-05-29 DIAGNOSIS — G89.29 INSOMNIA SECONDARY TO CHRONIC PAIN: ICD-10-CM

## 2019-05-29 RX ORDER — ZOLPIDEM TARTRATE 10 MG/1
TABLET ORAL
Qty: 90 TABLET | Refills: 1 | Status: SHIPPED | OUTPATIENT
Start: 2019-05-29 | End: 2019-09-10

## 2019-05-30 ENCOUNTER — OFFICE VISIT (OUTPATIENT)
Dept: PHYSICAL THERAPY | Age: 59
End: 2019-05-30
Attending: ORTHOPAEDIC SURGERY
Payer: MEDICARE

## 2019-05-30 PROCEDURE — 97110 THERAPEUTIC EXERCISES: CPT

## 2019-05-30 RX ORDER — ZOLPIDEM TARTRATE 10 MG/1
TABLET ORAL
Qty: 90 TABLET | Refills: 1 | OUTPATIENT
Start: 2019-05-30

## 2019-05-30 NOTE — PROGRESS NOTES
Dx: Complete rotator cuff tear of left shoulder (M75.122)     2/5/19  Fall Risk: standard      Precautions: See below for protocol   Insurance Authorized visits      24 medicare    Next MD visit: July Evaluation Date 5/1/19  Medication Changes since last v supination/pronation with  5x ea dir   Last PN 4/29 at visit 14    Assessment: Continued with ROM work. Focused on wrist/hand strengthening this visit due to pt's reported difficulty with cooking, cleaning, gripping tasks.  Pt with moderately dec independent strengthening program prior to discharge from PT      Phase 5:   (16-24 weeks)   1. Functional progression for sports and activity-specific tasks (i.e. golf, tennis. ..)   2. Interval sport programs as indicated   3.  Plyometrics with pitchback

## 2019-05-30 NOTE — TELEPHONE ENCOUNTER
TATIANNA 5-29-19 # 90 + 1    Site staff pls call in rx to pharm as MD already approved rx. Thanks.        Requested Prescriptions     Pending Prescriptions Disp Refills   • ZOLPIDEM TARTRATE 10 MG Oral Tab [Pharmacy Med Name: ZOLPIDEM TARTRATE 10 MG TABLET] 90

## 2019-06-03 ENCOUNTER — OFFICE VISIT (OUTPATIENT)
Dept: PHYSICAL THERAPY | Age: 59
End: 2019-06-03
Attending: ORTHOPAEDIC SURGERY
Payer: MEDICARE

## 2019-06-03 PROCEDURE — 97110 THERAPEUTIC EXERCISES: CPT

## 2019-06-03 NOTE — PROGRESS NOTES
Dx: Complete rotator cuff tear of left shoulder (M75.122)     2/5/19  Fall Risk: standard      Precautions: See below for protocol   Insurance Authorized visits      24 medicare    Next MD visit: July Evaluation Date 5/1/19  Medication Changes since last v strengthening exercises and decreased cervical compensation with scapular strengthening. Pt reported moderate fatigue by end of session, but no increase in pain. HEP:  Reviewed current HEP. Progressed to incorporate quadruped hand heel rock.  Rachel hamilton

## 2019-06-06 ENCOUNTER — OFFICE VISIT (OUTPATIENT)
Dept: PHYSICAL THERAPY | Age: 59
End: 2019-06-06
Attending: ORTHOPAEDIC SURGERY
Payer: MEDICARE

## 2019-06-06 PROCEDURE — 97110 THERAPEUTIC EXERCISES: CPT

## 2019-06-06 NOTE — PROGRESS NOTES
Patient Name: [place], [place]: 1/25/1960, MRN: [id number]   Date:  6/6/2019  Referring Physician:  Dr. Keith Child    Diagnosis: Complete rotator cuff tear of left shoulder (M75.122)    Progress Summary    Pt has attended 24 visits in Phys HEP  Standing doorway pec stretch at 90deg elevation [id number] L - HEP  Wall push up 20x    Goals     • Therapy Goals      1. Pt will be independent with progressive HEP, its' progression and management of symptoms - patient compliant  2.  Pt to exhibit L scarlet

## 2019-06-10 ENCOUNTER — APPOINTMENT (OUTPATIENT)
Dept: PHYSICAL THERAPY | Age: 59
End: 2019-06-10
Attending: ORTHOPAEDIC SURGERY
Payer: MEDICARE

## 2019-06-13 ENCOUNTER — OFFICE VISIT (OUTPATIENT)
Dept: PHYSICAL THERAPY | Age: 59
End: 2019-06-13
Attending: ORTHOPAEDIC SURGERY
Payer: MEDICARE

## 2019-06-13 PROCEDURE — 97110 THERAPEUTIC EXERCISES: CPT

## 2019-06-13 NOTE — PROGRESS NOTES
Dx: Complete rotator cuff tear of left shoulder (M75.122)     2/5/19  Fall Risk: standard      Precautions: See below for protocol   Insurance Authorized visits      28 medicare    Next MD visit: July Evaluation Date 5/1/19  Medication Changes since last v strengthening this visit. Pt able to tolerate all exercise progressions well with improved control and decreased fatigue. HEP:  Reviewed current HEP. Progressed to incorporate ER stretch and pec stretch. Progressed to GTB for rows, extensions and ER/IR.

## 2019-06-17 ENCOUNTER — APPOINTMENT (OUTPATIENT)
Dept: PHYSICAL THERAPY | Age: 59
End: 2019-06-17
Attending: ORTHOPAEDIC SURGERY
Payer: MEDICARE

## 2019-06-20 ENCOUNTER — OFFICE VISIT (OUTPATIENT)
Dept: PHYSICAL THERAPY | Age: 59
End: 2019-06-20
Attending: ORTHOPAEDIC SURGERY
Payer: MEDICARE

## 2019-06-20 PROCEDURE — 97110 THERAPEUTIC EXERCISES: CPT

## 2019-06-20 NOTE — PROGRESS NOTES
Dx: Complete rotator cuff tear of left shoulder (M75.122)     2/5/19  Fall Risk: standard      Precautions: See below for protocol   Insurance Authorized visits      28 medicare    Next MD visit: July Evaluation Date 5/1/19  Medication Changes since last v Therapy Goals      1. Pt will be independent with progressive HEP, its' progression and management of symptoms - pt complliant  2.  Pt to exhibit L shoulder AROM to [facility] with pain < 2/10 in order to reach into a cabinet to get a plate out - pt reports minima

## 2019-06-24 ENCOUNTER — APPOINTMENT (OUTPATIENT)
Dept: PHYSICAL THERAPY | Age: 59
End: 2019-06-24
Attending: ORTHOPAEDIC SURGERY
Payer: MEDICARE

## 2019-06-27 ENCOUNTER — OFFICE VISIT (OUTPATIENT)
Dept: PHYSICAL THERAPY | Age: 59
End: 2019-06-27
Attending: ORTHOPAEDIC SURGERY
Payer: MEDICARE

## 2019-06-27 PROCEDURE — 97530 THERAPEUTIC ACTIVITIES: CPT

## 2019-06-27 PROCEDURE — 97110 THERAPEUTIC EXERCISES: CPT

## 2019-06-27 NOTE — PROGRESS NOTES
Patient Name: [place], [place]: 1/25/1960, MRN: [id number]   Date:  6/27/2019  Referring Physician:  Dr. Jael Ross    Diagnosis: Complete rotator cuff tear of left shoulder (M75.122)     2/5/19    Discharge Summary    Pt has attende 4+/5; L: 4/5     Dynamometer:  R: 20, 19, 15  L: 23, 21, 22    Today's Treatment:  UBE lvl 3.2 3mins fwd, 3mins bwd  Re-assessment testing  Review of current home program and discussion of expectations with continued consistency with HEP  Standing resisted

## 2019-07-01 ENCOUNTER — OFFICE VISIT (OUTPATIENT)
Dept: ORTHOPEDICS CLINIC | Facility: CLINIC | Age: 59
End: 2019-07-01
Payer: MEDICARE

## 2019-07-01 ENCOUNTER — APPOINTMENT (OUTPATIENT)
Dept: PHYSICAL THERAPY | Age: 59
End: 2019-07-01
Attending: ORTHOPAEDIC SURGERY
Payer: MEDICARE

## 2019-07-01 VITALS — HEIGHT: 59 IN | BODY MASS INDEX: 26.21 KG/M2 | WEIGHT: 130 LBS

## 2019-07-01 DIAGNOSIS — M75.122 COMPLETE TEAR OF LEFT ROTATOR CUFF, UNSPECIFIED WHETHER TRAUMATIC: Primary | ICD-10-CM

## 2019-07-01 PROCEDURE — 99213 OFFICE O/P EST LOW 20 MIN: CPT | Performed by: ORTHOPAEDIC SURGERY

## 2019-07-01 PROCEDURE — G0463 HOSPITAL OUTPT CLINIC VISIT: HCPCS | Performed by: ORTHOPAEDIC SURGERY

## 2019-07-01 NOTE — PROGRESS NOTES
NURSING INTAKE COMMENTS: Patient presents with:  Post-Op: rotator cuff repaitr left ,patient denies pain today       Patient presents 5 months status post left shoulder arthroscopy, rotator cuff repair. The patient reports mild pain.   The patient has been Release TAKE 1 CAPSULE BY MOUTH EVERY DAY Disp: 90 capsule Rfl: 1   simvastatin 20 MG Oral Tab Take 1 tablet (20 mg total) by mouth nightly.  Disp: 90 tablet Rfl: 2   PAROXETINE HCL 20 MG Oral Tab TAKE 1/2 TABLET BY MOUTH EVERY MORNING (Patient taking diffe today for post-op.     Diagnoses and all orders for this visit:    Complete tear of left rotator cuff, unspecified whether traumatic        Plan:  Home exercise program  Post operative instructions reviewed  Follow up in as needed only  Patient doing well,

## 2019-08-09 RX ORDER — GABAPENTIN 300 MG/1
CAPSULE ORAL
Qty: 450 CAPSULE | Refills: 0 | Status: SHIPPED | OUTPATIENT
Start: 2019-08-09 | End: 2019-11-01

## 2019-08-09 NOTE — TELEPHONE ENCOUNTER
Refill Protocol Appointment Criteria  · Appointment scheduled in the past 6 months or in the next 3 months  Recent Outpatient Visits            1 month ago Complete tear of left rotator cuff, unspecified whether traumatic    [facility]

## 2019-08-16 RX ORDER — ERGOCALCIFEROL 1.25 MG/1
CAPSULE ORAL
Qty: 12 CAPSULE | Refills: 4 | OUTPATIENT
Start: 2019-08-16

## 2019-08-30 RX ORDER — SIMVASTATIN 20 MG
TABLET ORAL
Qty: 90 TABLET | Refills: 1 | Status: SHIPPED | OUTPATIENT
Start: 2019-08-30 | End: 2020-02-18

## 2019-08-30 NOTE — TELEPHONE ENCOUNTER
Please review; protocol failed.     Requested Prescriptions     Pending Prescriptions Disp Refills   • SIMVASTATIN 20 MG Oral Tab [Pharmacy Med Name: SIMVASTATIN 20 MG TABLET] 90 tablet 2     Sig: TAKE 1 TABLET BY MOUTH EVERY DAY AT [facility]

## 2019-09-10 ENCOUNTER — OFFICE VISIT (OUTPATIENT)
Dept: FAMILY MEDICINE CLINIC | Facility: CLINIC | Age: 59
End: 2019-09-10
Payer: MEDICARE

## 2019-09-10 VITALS
SYSTOLIC BLOOD PRESSURE: 150 MMHG | TEMPERATURE: 99 F | HEART RATE: 67 BPM | DIASTOLIC BLOOD PRESSURE: 105 MMHG | HEIGHT: 59 IN | BODY MASS INDEX: 26.57 KG/M2 | WEIGHT: 131.81 LBS

## 2019-09-10 DIAGNOSIS — K63.5 POLYP OF COLON, UNSPECIFIED PART OF COLON, UNSPECIFIED TYPE: ICD-10-CM

## 2019-09-10 DIAGNOSIS — Z00.00 MEDICARE ANNUAL WELLNESS VISIT, SUBSEQUENT: Primary | ICD-10-CM

## 2019-09-10 DIAGNOSIS — I10 ESSENTIAL HYPERTENSION: ICD-10-CM

## 2019-09-10 DIAGNOSIS — L30.9 ECZEMA, UNSPECIFIED TYPE: ICD-10-CM

## 2019-09-10 DIAGNOSIS — G89.29 INSOMNIA SECONDARY TO CHRONIC PAIN: ICD-10-CM

## 2019-09-10 DIAGNOSIS — F33.1 MODERATE EPISODE OF RECURRENT MAJOR DEPRESSIVE DISORDER (HCC): ICD-10-CM

## 2019-09-10 DIAGNOSIS — A04.8 H. PYLORI INFECTION: ICD-10-CM

## 2019-09-10 DIAGNOSIS — M81.0 AGE-RELATED OSTEOPOROSIS WITHOUT CURRENT PATHOLOGICAL FRACTURE: ICD-10-CM

## 2019-09-10 DIAGNOSIS — M79.7 FIBROMYALGIA: ICD-10-CM

## 2019-09-10 DIAGNOSIS — K21.9 GASTROESOPHAGEAL REFLUX DISEASE WITHOUT ESOPHAGITIS: ICD-10-CM

## 2019-09-10 DIAGNOSIS — E78.00 PURE HYPERCHOLESTEROLEMIA: ICD-10-CM

## 2019-09-10 DIAGNOSIS — M75.102 TEAR OF LEFT ROTATOR CUFF, UNSPECIFIED TEAR EXTENT, UNSPECIFIED WHETHER TRAUMATIC: ICD-10-CM

## 2019-09-10 DIAGNOSIS — M72.2 PLANTAR FASCIITIS: ICD-10-CM

## 2019-09-10 DIAGNOSIS — E55.9 HYPOVITAMINOSIS D: ICD-10-CM

## 2019-09-10 DIAGNOSIS — M54.50 CHRONIC LOW BACK PAIN WITHOUT SCIATICA, UNSPECIFIED BACK PAIN LATERALITY: ICD-10-CM

## 2019-09-10 DIAGNOSIS — G89.29 CHRONIC LOW BACK PAIN WITHOUT SCIATICA, UNSPECIFIED BACK PAIN LATERALITY: ICD-10-CM

## 2019-09-10 DIAGNOSIS — M85.89 OSTEOPENIA OF MULTIPLE SITES: ICD-10-CM

## 2019-09-10 DIAGNOSIS — M48.061 SPINAL STENOSIS OF LUMBAR REGION WITHOUT NEUROGENIC CLAUDICATION: ICD-10-CM

## 2019-09-10 DIAGNOSIS — G47.01 INSOMNIA SECONDARY TO CHRONIC PAIN: ICD-10-CM

## 2019-09-10 PROCEDURE — G0439 PPPS, SUBSEQ VISIT: HCPCS | Performed by: FAMILY MEDICINE

## 2019-09-10 PROCEDURE — 90686 IIV4 VACC NO PRSV 0.5 ML IM: CPT | Performed by: FAMILY MEDICINE

## 2019-09-10 PROCEDURE — G0008 ADMIN INFLUENZA VIRUS VAC: HCPCS | Performed by: FAMILY MEDICINE

## 2019-09-10 RX ORDER — TRAMADOL HYDROCHLORIDE 50 MG/1
50 TABLET ORAL EVERY 8 HOURS PRN
Qty: 60 TABLET | Refills: 2 | Status: SHIPPED | OUTPATIENT
Start: 2019-09-10 | End: 2020-04-13

## 2019-09-10 RX ORDER — ZOLPIDEM TARTRATE 10 MG/1
TABLET ORAL
Qty: 90 TABLET | Refills: 1 | Status: SHIPPED | OUTPATIENT
Start: 2019-09-10 | End: 2020-04-13

## 2019-09-10 RX ORDER — ALENDRONATE SODIUM 70 MG/1
70 TABLET ORAL WEEKLY
Qty: 12 TABLET | Refills: 1 | Status: SHIPPED | OUTPATIENT
Start: 2019-09-10 | End: 2020-02-18

## 2019-09-10 RX ORDER — LOSARTAN POTASSIUM 50 MG/1
TABLET ORAL
Qty: 60 TABLET | Refills: 4 | Status: SHIPPED | OUTPATIENT
Start: 2019-09-10 | End: 2019-11-14

## 2019-09-10 NOTE — PROGRESS NOTES
HPI:   Mary Werner is a 60year old female who presents for a Medicare Subsequent Annual Wellness visit (Pt already had Initial Annual Wellness).     Ren Bergeron is a 60year old female present today for a routine periodic health g has problems with Daily Activities based on screening of functional status. Depression Screening (PHQ-2/PHQ-9): Over the LAST 2 WEEKS         Advanced Directive:   She does NOT have a Living Will on file in [address] [place] [address] Rd.    Advance care planning includ 05/08/2018        ALLERGIES:   She is allergic to seafood; pollen; and seasonal.    CURRENT MEDICATIONS:     No outpatient medications have been marked as taking for the 9/10/19 encounter (Office Visit) with Poppy Mcfarland MD.   MEDICAL INFORMATION: Psychiatric/Behavioral: Positive for sleep disturbance and depressed mood. The patient is not nervous/anxious.            EXAM:   Ht 4' 11\" (1.499 m)   Wt 131 lb 12.8 oz (59.8 kg)   BMI 26.62 kg/m²  Estimated body mass index is 26.62 kg/m² as calculated Normocephalic, Normal red light reflex bilaterally, pupils equally reactive to light and accommodation, none icteric sclera. Normal tympanic membranes with normal light reflex bilaterally.  Normal nasal septum, throat clear without lesions or exudate and without sciatica, unspecified back pain laterality    Fibromyalgia    Spinal stenosis of lumbar region without neurogenic claudication    Age-related osteoporosis without current pathological fracture    Osteopenia of multiple sites    Tear of left rotator Attempt to keep a schedule that includes adequate sleep, and physical activities/exercise. Patient was educated on sexual transmitted disease. Best to abstain from sexual intercourse until she is ready to form a family.  Use of condoms may prevent transmiss patient  PREVENTATIVE SERVICES  INDICATIONS AND SCHEDULE Internal Lab or Procedure External Lab or Procedure   Diabetes Screening      HbgA1C   Annually GLYCOHEMOGLOBIN (HgA1c) (L) (%)   Date Value   06/01/2016 6.1 (H)     Glycohemoglobin (HgA1c) (%)   Edi found Please get once after your 65th birthday    Pneumococcal 23 (Pneumovax)  Covered Once after 65 No vaccine history found Please get once after your 65th birthday    Hepatitis B for Moderate/High Risk No vaccine history found Medium/high risk factors:

## 2019-09-13 RX ORDER — LOSARTAN POTASSIUM 50 MG/1
TABLET ORAL
Qty: 60 TABLET | Refills: 4 | OUTPATIENT
Start: 2019-09-13

## 2019-09-21 ENCOUNTER — LAB ENCOUNTER (OUTPATIENT)
Dept: LAB | Age: 59
End: 2019-09-21
Attending: FAMILY MEDICINE
Payer: MEDICARE

## 2019-09-21 DIAGNOSIS — I10 ESSENTIAL HYPERTENSION: ICD-10-CM

## 2019-09-21 DIAGNOSIS — E78.00 PURE HYPERCHOLESTEROLEMIA: ICD-10-CM

## 2019-09-21 LAB
ALBUMIN SERPL-MCNC: 4.2 G/DL (ref 3.4–5)
ALBUMIN/GLOB SERPL: 1.2 {RATIO} (ref 1–2)
ALP LIVER SERPL-CCNC: 66 U/L (ref 46–118)
ALT SERPL-CCNC: 15 U/L (ref 13–56)
ANION GAP SERPL CALC-SCNC: 5 MMOL/L (ref 0–18)
AST SERPL-CCNC: 13 U/L (ref 15–37)
BACTERIA UR QL AUTO: NEGATIVE /HPF
BASOPHILS # BLD AUTO: 0.04 X10(3) UL (ref 0–0.2)
BASOPHILS NFR BLD AUTO: 0.9 %
BILIRUB SERPL-MCNC: 0.6 MG/DL (ref 0.1–2)
BILIRUB UR QL: NEGATIVE
BUN BLD-MCNC: 15 MG/DL (ref 7–18)
BUN/CREAT SERPL: 19 (ref 10–20)
CALCIUM BLD-MCNC: 9.8 MG/DL (ref 8.5–10.1)
CHLORIDE SERPL-SCNC: 106 MMOL/L (ref 98–112)
CHOLEST SMN-MCNC: 176 MG/DL (ref ?–200)
CLARITY UR: CLEAR
CO2 SERPL-SCNC: 31 MMOL/L (ref 21–32)
COLOR UR: YELLOW
CREAT BLD-MCNC: 0.79 MG/DL (ref 0.55–1.02)
DEPRECATED RDW RBC AUTO: 40 FL (ref 35.1–46.3)
EOSINOPHIL # BLD AUTO: 0.08 X10(3) UL (ref 0–0.7)
EOSINOPHIL NFR BLD AUTO: 1.8 %
ERYTHROCYTE [DISTWIDTH] IN BLOOD BY AUTOMATED COUNT: 12.3 % (ref 11–15)
EST. AVERAGE GLUCOSE BLD GHB EST-MCNC: 131 MG/DL (ref 68–126)
GLOBULIN PLAS-MCNC: 3.5 G/DL (ref 2.8–4.4)
GLUCOSE BLD-MCNC: 107 MG/DL (ref 70–99)
GLUCOSE UR-MCNC: NEGATIVE MG/DL
HBA1C MFR BLD HPLC: 6.2 % (ref ?–5.7)
HCT VFR BLD AUTO: 39.8 % (ref 35–48)
HDLC SERPL-MCNC: 64 MG/DL (ref 40–59)
HGB BLD-MCNC: 13 G/DL (ref 12–16)
HGB UR QL STRIP.AUTO: NEGATIVE
IMM GRANULOCYTES # BLD AUTO: 0.01 X10(3) UL (ref 0–1)
IMM GRANULOCYTES NFR BLD: 0.2 %
KETONES UR-MCNC: NEGATIVE MG/DL
LDLC SERPL CALC-MCNC: 93 MG/DL (ref ?–100)
LEUKOCYTE ESTERASE UR QL STRIP.AUTO: NEGATIVE
LYMPHOCYTES # BLD AUTO: 1.34 X10(3) UL (ref 1–4)
LYMPHOCYTES NFR BLD AUTO: 29.8 %
M PROTEIN MFR SERPL ELPH: 7.7 G/DL (ref 6.4–8.2)
MCH RBC QN AUTO: 29 PG (ref 26–34)
MCHC RBC AUTO-ENTMCNC: 32.7 G/DL (ref 31–37)
MCV RBC AUTO: 88.8 FL (ref 80–100)
MONOCYTES # BLD AUTO: 0.34 X10(3) UL (ref 0.1–1)
MONOCYTES NFR BLD AUTO: 7.6 %
NEUTROPHILS # BLD AUTO: 2.69 X10 (3) UL (ref 1.5–7.7)
NEUTROPHILS # BLD AUTO: 2.69 X10(3) UL (ref 1.5–7.7)
NEUTROPHILS NFR BLD AUTO: 59.7 %
NITRITE UR QL STRIP.AUTO: NEGATIVE
NONHDLC SERPL-MCNC: 112 MG/DL (ref ?–130)
OSMOLALITY SERPL CALC.SUM OF ELEC: 295 MOSM/KG (ref 275–295)
PATIENT FASTING: YES
PATIENT FASTING: YES
PH UR: 7 [PH] (ref 5–8)
PLATELET # BLD AUTO: 361 10(3)UL (ref 150–450)
POTASSIUM SERPL-SCNC: 4.4 MMOL/L (ref 3.5–5.1)
PROT UR-MCNC: 30 MG/DL
RBC # BLD AUTO: 4.48 X10(6)UL (ref 3.8–5.3)
RBC #/AREA URNS AUTO: 1 /HPF
RBC #/AREA URNS AUTO: 1 /HPF
SODIUM SERPL-SCNC: 142 MMOL/L (ref 136–145)
SP GR UR STRIP: 1.02 (ref 1–1.03)
TRIGL SERPL-MCNC: 95 MG/DL (ref 30–149)
TSI SER-ACNC: 0.95 MIU/ML (ref 0.36–3.74)
UROBILINOGEN UR STRIP-ACNC: <2
VLDLC SERPL CALC-MCNC: 19 MG/DL (ref 0–30)
WBC # BLD AUTO: 4.5 X10(3) UL (ref 4–11)
WBC #/AREA URNS AUTO: 1 /HPF
WBC #/AREA URNS AUTO: 1 /HPF

## 2019-09-21 PROCEDURE — 84443 ASSAY THYROID STIM HORMONE: CPT

## 2019-09-21 PROCEDURE — 81015 MICROSCOPIC EXAM OF URINE: CPT | Performed by: FAMILY MEDICINE

## 2019-09-21 PROCEDURE — 82306 VITAMIN D 25 HYDROXY: CPT | Performed by: FAMILY MEDICINE

## 2019-09-21 PROCEDURE — 80061 LIPID PANEL: CPT

## 2019-09-21 PROCEDURE — 83036 HEMOGLOBIN GLYCOSYLATED A1C: CPT

## 2019-09-21 PROCEDURE — 36415 COLL VENOUS BLD VENIPUNCTURE: CPT

## 2019-09-21 PROCEDURE — 85025 COMPLETE CBC W/AUTO DIFF WBC: CPT

## 2019-09-21 PROCEDURE — 80053 COMPREHEN METABOLIC PANEL: CPT

## 2019-09-21 PROCEDURE — 81001 URINALYSIS AUTO W/SCOPE: CPT | Performed by: FAMILY MEDICINE

## 2019-09-23 LAB — 25(OH)D3 SERPL-MCNC: 35.4 NG/ML (ref 30–100)

## 2019-09-25 RX ORDER — ALENDRONATE SODIUM 70 MG/1
70 TABLET ORAL WEEKLY
Qty: 12 TABLET | Refills: 1 | OUTPATIENT
Start: 2019-09-25

## 2019-09-26 NOTE — TELEPHONE ENCOUNTER
Duplicate request, previously addressed. Approved 9/10/19 for 6 month supply.     Requested Prescriptions   Pending Prescriptions Disp Refills   • ALENDRONATE 70 MG Oral Tab [Pharmacy Med Name: ALENDRONATE SODIUM 70 MG TAB] 12 tablet 1     Sig: TAKE 1

## 2019-10-17 ENCOUNTER — TELEPHONE (OUTPATIENT)
Dept: FAMILY MEDICINE CLINIC | Facility: CLINIC | Age: 59
End: 2019-10-17

## 2019-10-17 NOTE — TELEPHONE ENCOUNTER
Pt called stating she needs new refill for Losartan Potassium 50 MG Oral Tab [[id number]      Pt states she takes it twice daily.  Pt picked up medication but states it was prescribed incorrectly

## 2019-10-17 NOTE — TELEPHONE ENCOUNTER
I reviewed with pt last script 9/10/19 losartan potassium 50 mg one tablet twice daily with quantity 60+4 refills was written by Dr Sheri Conrad. Call pharmacy to request next refill. Pt verbalized understanding.

## 2019-10-25 ENCOUNTER — TELEPHONE (OUTPATIENT)
Dept: FAMILY MEDICINE CLINIC | Facility: CLINIC | Age: 59
End: 2019-10-25

## 2019-10-25 NOTE — TELEPHONE ENCOUNTER
Patient would like to inform Dr. Go Levine that her insurance does not want to pay for the shingles vaccine. Per patient they need something in writing/prior authorization that she needs to get this vaccine. Patient is requesting a return call in [address] [place] [address]

## 2019-10-29 NOTE — TELEPHONE ENCOUNTER
Patient wanted to know if Dr. Lady Jones had recommendations as to how to obtain the shingrix immunization. Informed patient of medicare not covering vaccine in office due to billing as an injection.  Advised patient to contact pharmacy since the are able to

## 2019-11-02 RX ORDER — GABAPENTIN 300 MG/1
CAPSULE ORAL
Qty: 450 CAPSULE | Refills: 0 | Status: SHIPPED | OUTPATIENT
Start: 2019-11-02 | End: 2020-01-08

## 2019-11-13 ENCOUNTER — TELEPHONE (OUTPATIENT)
Dept: FAMILY MEDICINE CLINIC | Facility: CLINIC | Age: 59
End: 2019-11-13

## 2019-11-13 NOTE — TELEPHONE ENCOUNTER
losartan Potassium 100 MG Oral Tab, TAKE 1 TABLET (100 MG TOTAL) BY MOUTH BID., Disp: 60 tablet, Rfl: 4

## 2019-11-13 NOTE — TELEPHONE ENCOUNTER
British Speaking - Patient is requesting refill of medication losartan Potassium 50 MG Oral Tab. Patient states she has enough medication to last her for three days.     Patient states [facility] ([place]) advised her to contact Dr. Saúl Jorgensen's off

## 2019-11-14 RX ORDER — LOSARTAN POTASSIUM 50 MG/1
TABLET ORAL
Qty: 180 TABLET | Refills: 1 | Status: SHIPPED | OUTPATIENT
Start: 2019-11-14 | End: 2020-06-01

## 2019-11-15 NOTE — TELEPHONE ENCOUNTER
Refill passed per [facility], [facility] protocol.   Hypertensive Medications  Protocol Criteria:  · Appointment scheduled in the past 6 months or in the next 3 months  · BMP or CMP in the past 12 months  · Creatinine result < 2  Recent Outpatient Visits

## 2019-11-18 ENCOUNTER — TELEPHONE (OUTPATIENT)
Dept: FAMILY MEDICINE CLINIC | Facility: CLINIC | Age: 59
End: 2019-11-18

## 2019-11-18 NOTE — TELEPHONE ENCOUNTER
With  #[id number], spoke to patient who states that she saw Dr. Cheryl Marshall for what she feels is a pinched nerve in her upper extremity.  She states provider had ordered her an EMG/MCV and she would like to know if she can have this test

## 2019-11-18 NOTE — TELEPHONE ENCOUNTER
Patient states she has a pinched nerve . States shes in a lot of pain. Says she has an order from [facility] for Radiculopathy. Asking if we do tests for it.      Transferring to triage

## 2019-11-22 NOTE — TELEPHONE ENCOUNTER
Spoke to patient; informed RX was sent 11/14/19 losartan 50mg 1 po twice daily. She insists the pharmacy doesn't have this on file. DR Delgado Bright:  Called [facility]. The pharmacy states that insurance will not cover losartan 50mg BID.      They cover 100mg ta

## 2019-11-25 NOTE — TELEPHONE ENCOUNTER
Prior authorization for Losartan Potassium 50MG oral tab completed w/ [employer] on cover my meds Key: [id number]  PA approved CaseId:[id number];Status:Approved; Review Type:Qty;Coverage Start Date:10/26/2019; Coverage End Date:11/24/2020.  Pharmacy notified

## 2020-01-01 NOTE — PROGRESS NOTES
Dx: Complete rotator cuff tear of left shoulder (N82.295)         Visit # 5  Fall Risk: standard     Scheduled Visits 8  Precautions: See below for protocol   Insurance Authorized visits      10 medicare    Next MD visit: none scheduled  Evaluation Date 3/ least 4/5 in order to sit with good posture and carry groceries              Plan: Cont PT per plan of care    Charges: 3 therex       Total Timed Treatment: 38 min  Total Treatment Time: 48 min    PROTOCOL  If a biceps tenodesis is performed in addition t periscapular strengthening as appropriate (prone rowing, prone shoulder extension)      Home Exercise Program   1. Stretching for full ROM in all directions   2. Active exercise as directed by physical therapist   3.  Cryotherapy prn      Phase 3:   8-12 we start isokinetics for IR/ER beginning in a modified position with moderate speeds (120o-240o)      Home Exercise Program   1. Stretching to maintain ROM as needed   2. Strengthening as directed by PT.  Pt should have independent strengthening program prior Statement Selected

## 2020-01-08 RX ORDER — GABAPENTIN 300 MG/1
CAPSULE ORAL
Qty: 450 CAPSULE | Refills: 1 | Status: SHIPPED | OUTPATIENT
Start: 2020-01-08 | End: 2020-07-02

## 2020-01-08 NOTE — TELEPHONE ENCOUNTER
Refill passed per [facility], [facility] protocol.   Refill Protocol Appointment Criteria  · Appointment scheduled in the past 6 months or in the next 3 months  Recent Outpatient Visits            1 month ago Cervical radiculopathy    [id number]

## 2020-01-09 ENCOUNTER — TELEPHONE (OUTPATIENT)
Dept: FAMILY MEDICINE CLINIC | Facility: CLINIC | Age: 60
End: 2020-01-09

## 2020-01-10 RX ORDER — OMEPRAZOLE 40 MG/1
CAPSULE, DELAYED RELEASE ORAL
Qty: 90 CAPSULE | Refills: 1 | Status: SHIPPED | OUTPATIENT
Start: 2020-01-10 | End: 2020-07-02

## 2020-01-10 NOTE — TELEPHONE ENCOUNTER
Prior authorization for Losartan 50mg oral tab completed w/ [employer] on cover my meds Key: Whitley Bundy, turn around time 3-5 days.

## 2020-01-10 NOTE — TELEPHONE ENCOUNTER
Refill passed per [facility], [facility] protocol.   Refill Protocol Appointment Criteria  · Appointment scheduled in the past 12 months or in the next 3 months  Recent Outpatient Visits            1 month ago Cervical radiculopathy    [address]

## 2020-01-15 NOTE — TELEPHONE ENCOUNTER
Outcome   Approvedtoday   CaseId:[id number];Status:Approved; Review Type:Qty;Coverage Start Date:12/16/2019; Coverage End Date:01/14/2021;   DrugLosartan Potassium 50MG tablets    Voicemail left on pharmacy voicemail.

## 2020-02-18 RX ORDER — ALENDRONATE SODIUM 70 MG/1
TABLET ORAL
Qty: 12 TABLET | Refills: 0 | Status: SHIPPED | OUTPATIENT
Start: 2020-02-18 | End: 2020-05-07

## 2020-02-18 RX ORDER — SIMVASTATIN 20 MG
TABLET ORAL
Qty: 90 TABLET | Refills: 0 | Status: SHIPPED | OUTPATIENT
Start: 2020-02-18 | End: 2020-05-12

## 2020-02-18 NOTE — TELEPHONE ENCOUNTER
To reception staff, pls call pt for appt. Refill passed per [facility], [facility] protocol.     Cholesterol Medications  Protocol Criteria:  · Appointment scheduled in the past 12 months or in the next 3 months  · ALT & LDL on file in the past 12 months  · AL Jimmy Aquino MD    Office Visit

## 2020-02-18 NOTE — TELEPHONE ENCOUNTER
Left message on machine for pt to return call.   Left message to call back to schedule appointment with physician.

## 2020-02-20 RX ORDER — TRAMADOL HYDROCHLORIDE 50 MG/1
50 TABLET ORAL EVERY 8 HOURS PRN
Qty: 60 TABLET | Refills: 2 | OUTPATIENT
Start: 2020-02-20

## 2020-02-20 NOTE — TELEPHONE ENCOUNTER
Montene[place] speaking - Pt stts she will call back later once she speaks with  so he can take her.  Please advise

## 2020-02-20 NOTE — TELEPHONE ENCOUNTER
Controlled medication pending for review. Please change to phone in, fax, or print script if not being sent electronically.     Last Rx: 09/10/19  LOV: 09/10/19

## 2020-03-05 RX ORDER — MOMETASONE FUROATE 1 MG/G
CREAM TOPICAL
Qty: 30 G | Refills: 0 | Status: SHIPPED | OUTPATIENT
Start: 2020-03-05 | End: 2020-07-13

## 2020-04-13 DIAGNOSIS — G89.29 INSOMNIA SECONDARY TO CHRONIC PAIN: ICD-10-CM

## 2020-04-13 DIAGNOSIS — G47.01 INSOMNIA SECONDARY TO CHRONIC PAIN: ICD-10-CM

## 2020-04-13 RX ORDER — ZOLPIDEM TARTRATE 10 MG/1
TABLET ORAL
Qty: 90 TABLET | Refills: 0 | Status: SHIPPED | OUTPATIENT
Start: 2020-04-13 | End: 2020-08-03

## 2020-04-13 RX ORDER — TRAMADOL HYDROCHLORIDE 50 MG/1
TABLET ORAL
Qty: 60 TABLET | Refills: 0 | Status: SHIPPED | OUTPATIENT
Start: 2020-04-13 | End: 2020-08-03

## 2020-05-07 RX ORDER — ALENDRONATE SODIUM 70 MG/1
TABLET ORAL
Qty: 12 TABLET | Refills: 0 | Status: SHIPPED | OUTPATIENT
Start: 2020-05-07 | End: 2020-07-23

## 2020-05-12 RX ORDER — SIMVASTATIN 20 MG
TABLET ORAL
Qty: 90 TABLET | Refills: 0 | Status: SHIPPED | OUTPATIENT
Start: 2020-05-12 | End: 2020-08-03

## 2020-06-01 RX ORDER — LOSARTAN POTASSIUM 50 MG/1
TABLET ORAL
Qty: 90 TABLET | Refills: 0 | Status: SHIPPED | OUTPATIENT
Start: 2020-06-01 | End: 2020-09-26

## 2020-07-02 RX ORDER — GABAPENTIN 300 MG/1
CAPSULE ORAL
Qty: 450 CAPSULE | Refills: 1 | Status: SHIPPED | OUTPATIENT
Start: 2020-07-02 | End: 2020-08-03

## 2020-07-02 RX ORDER — OMEPRAZOLE 40 MG/1
CAPSULE, DELAYED RELEASE ORAL
Qty: 90 CAPSULE | Refills: 1 | Status: SHIPPED | OUTPATIENT
Start: 2020-07-02 | End: 2020-12-22

## 2020-07-07 DIAGNOSIS — G47.01 INSOMNIA SECONDARY TO CHRONIC PAIN: ICD-10-CM

## 2020-07-07 DIAGNOSIS — G89.29 INSOMNIA SECONDARY TO CHRONIC PAIN: ICD-10-CM

## 2020-07-07 RX ORDER — ZOLPIDEM TARTRATE 10 MG/1
TABLET ORAL
Qty: 90 TABLET | Refills: 0 | OUTPATIENT
Start: 2020-07-07

## 2020-07-07 RX ORDER — TRAMADOL HYDROCHLORIDE 50 MG/1
50 TABLET ORAL EVERY 8 HOURS PRN
Qty: 60 TABLET | Refills: 0 | OUTPATIENT
Start: 2020-07-07

## 2020-07-07 NOTE — TELEPHONE ENCOUNTER
TRAMADOL HCL 50 MG Oral Tab  Zolpidem Tartrate 10 MG Oral Tab    Patient calling for a refill on her medication       Please advise   [phone]

## 2020-07-13 RX ORDER — MOMETASONE FUROATE 1 MG/G
CREAM TOPICAL
Qty: 30 G | Refills: 0 | Status: SHIPPED | OUTPATIENT
Start: 2020-07-13 | End: 2021-11-30

## 2020-07-22 DIAGNOSIS — G89.29 INSOMNIA SECONDARY TO CHRONIC PAIN: ICD-10-CM

## 2020-07-22 DIAGNOSIS — G47.01 INSOMNIA SECONDARY TO CHRONIC PAIN: ICD-10-CM

## 2020-07-23 RX ORDER — ZOLPIDEM TARTRATE 10 MG/1
TABLET ORAL
Qty: 90 TABLET | Refills: 0 | OUTPATIENT
Start: 2020-07-23

## 2020-07-23 RX ORDER — ALENDRONATE SODIUM 70 MG/1
TABLET ORAL
Qty: 12 TABLET | Refills: 0 | Status: SHIPPED | OUTPATIENT
Start: 2020-07-23 | End: 2020-10-08

## 2020-07-23 RX ORDER — TRAMADOL HYDROCHLORIDE 50 MG/1
TABLET ORAL
Qty: 60 TABLET | Refills: 0 | OUTPATIENT
Start: 2020-07-23

## 2020-07-23 NOTE — TELEPHONE ENCOUNTER
Patient has a follow up appointment on 8/3. No sooner appointment is available and patient  declined virtual visit. Patient will be out of medication before appointment. Please advise.

## 2020-08-03 ENCOUNTER — OFFICE VISIT (OUTPATIENT)
Dept: FAMILY MEDICINE CLINIC | Facility: CLINIC | Age: 60
End: 2020-08-03
Payer: MEDICARE

## 2020-08-03 VITALS
RESPIRATION RATE: 18 BRPM | BODY MASS INDEX: 26.21 KG/M2 | SYSTOLIC BLOOD PRESSURE: 148 MMHG | TEMPERATURE: 99 F | HEART RATE: 63 BPM | DIASTOLIC BLOOD PRESSURE: 83 MMHG | WEIGHT: 130 LBS | HEIGHT: 59 IN

## 2020-08-03 DIAGNOSIS — I10 ESSENTIAL HYPERTENSION: ICD-10-CM

## 2020-08-03 DIAGNOSIS — M54.50 CHRONIC LOW BACK PAIN WITHOUT SCIATICA, UNSPECIFIED BACK PAIN LATERALITY: Primary | ICD-10-CM

## 2020-08-03 DIAGNOSIS — R73.03 PREDIABETES: ICD-10-CM

## 2020-08-03 DIAGNOSIS — G89.29 CHRONIC LOW BACK PAIN WITHOUT SCIATICA, UNSPECIFIED BACK PAIN LATERALITY: Primary | ICD-10-CM

## 2020-08-03 DIAGNOSIS — E78.00 PURE HYPERCHOLESTEROLEMIA: ICD-10-CM

## 2020-08-03 DIAGNOSIS — M48.061 SPINAL STENOSIS OF LUMBAR REGION WITHOUT NEUROGENIC CLAUDICATION: ICD-10-CM

## 2020-08-03 PROCEDURE — G0463 HOSPITAL OUTPT CLINIC VISIT: HCPCS | Performed by: FAMILY MEDICINE

## 2020-08-03 PROCEDURE — 99214 OFFICE O/P EST MOD 30 MIN: CPT | Performed by: FAMILY MEDICINE

## 2020-08-03 RX ORDER — TRAMADOL HYDROCHLORIDE 50 MG/1
50 TABLET ORAL EVERY 8 HOURS PRN
Qty: 60 TABLET | Refills: 0 | Status: SHIPPED | OUTPATIENT
Start: 2020-08-03 | End: 2020-11-24

## 2020-08-03 RX ORDER — SIMVASTATIN 20 MG
20 TABLET ORAL NIGHTLY
Qty: 90 TABLET | Refills: 1 | Status: SHIPPED | OUTPATIENT
Start: 2020-08-03 | End: 2021-01-28

## 2020-08-03 RX ORDER — GABAPENTIN 300 MG/1
CAPSULE ORAL
Qty: 450 CAPSULE | Refills: 1 | Status: SHIPPED | OUTPATIENT
Start: 2020-08-03 | End: 2021-02-04

## 2020-08-03 RX ORDER — ZOLPIDEM TARTRATE 10 MG/1
TABLET ORAL
Qty: 90 TABLET | Refills: 0 | Status: SHIPPED | OUTPATIENT
Start: 2020-08-03 | End: 2020-11-24

## 2020-08-03 RX ORDER — PAROXETINE HYDROCHLORIDE 20 MG/1
10 TABLET, FILM COATED ORAL EVERY MORNING
Qty: 30 TABLET | Refills: 2 | Status: SHIPPED | OUTPATIENT
Start: 2020-08-03 | End: 2021-01-28

## 2020-08-03 NOTE — PROGRESS NOTES
8/3/2020  6:22 PM    Calvin Asher is a 63year old female. Chief complaint(s): Patient presents with:  Medication Follow-Up: 5 Refills    HPI:     Calvin Asher primary complaint is regarding multiple complaints.      Patient is syndrome     L & R   • Cyst of finger 2013    R index   • Depression    • Esophageal reflux    • Fibromyalgia    • High blood pressure    • High cholesterol    • Obesity    • Prediabetes    • Rotator cuff disorder     right shoulder   • Visual impairment gabapentin 300 MG Oral Cap Take 1 tab po Q am, 2 tab po qhs 450 capsule 1   • ALENDRONATE 70 MG Oral Tab TAKE 1 TABLET BY MOUTH ONE TIME PER WEEK 12 tablet 0   • MOMETASONE FUROATE 0.1 % External Cream APPLY TO AFFECTED AREA TWICE A DAY 30 g 0   • [facility] found.     ASSESSMENT/PLAN:   Assessment   Chronic low back pain without sciatica, unspecified back pain laterality  (primary encounter diagnosis)  Spinal stenosis of lumbar region without neurogenic claudication  Essential hypertension  Pure hypercholeste tablet (20 mg total) by mouth nightly. • PARoxetine HCl 20 MG Oral Tab 30 tablet 2     Sig: Take 0.5 tablets (10 mg total) by mouth every morning. • Glucose Blood In Vitro Strip 100 strip 6     Sig: Test blood sugar twice daily.    • gabapentin 300 MG O cholesterol/low fat diet. Patient was instructed to taker the medication nightly. Weight reduction plan was discussed. Furthermore, patient was informed to call our office with any questions or concerns.  Notify Dr Rajiv Troy or the [facility] if there i

## 2020-09-05 ENCOUNTER — LAB ENCOUNTER (OUTPATIENT)
Dept: LAB | Age: 60
End: 2020-09-05
Attending: FAMILY MEDICINE
Payer: MEDICARE

## 2020-09-05 DIAGNOSIS — I10 ESSENTIAL HYPERTENSION: ICD-10-CM

## 2020-09-05 DIAGNOSIS — E78.00 PURE HYPERCHOLESTEROLEMIA: ICD-10-CM

## 2020-09-05 DIAGNOSIS — R73.03 PREDIABETES: ICD-10-CM

## 2020-09-05 LAB
ALBUMIN SERPL-MCNC: 4 G/DL (ref 3.4–5)
ALBUMIN/GLOB SERPL: 1.2 {RATIO} (ref 1–2)
ALP LIVER SERPL-CCNC: 68 U/L (ref 50–130)
ALT SERPL-CCNC: 22 U/L (ref 13–56)
ANION GAP SERPL CALC-SCNC: 6 MMOL/L (ref 0–18)
AST SERPL-CCNC: 18 U/L (ref 15–37)
BASOPHILS # BLD AUTO: 0.06 X10(3) UL (ref 0–0.2)
BASOPHILS NFR BLD AUTO: 1.1 %
BILIRUB SERPL-MCNC: 0.2 MG/DL (ref 0.1–2)
BUN BLD-MCNC: 14 MG/DL (ref 7–18)
BUN/CREAT SERPL: 18.4 (ref 10–20)
CALCIUM BLD-MCNC: 9.7 MG/DL (ref 8.5–10.1)
CHLORIDE SERPL-SCNC: 106 MMOL/L (ref 98–112)
CHOLEST SMN-MCNC: 170 MG/DL (ref ?–200)
CO2 SERPL-SCNC: 28 MMOL/L (ref 21–32)
CREAT BLD-MCNC: 0.76 MG/DL (ref 0.55–1.02)
DEPRECATED RDW RBC AUTO: 41.7 FL (ref 35.1–46.3)
EOSINOPHIL # BLD AUTO: 0.14 X10(3) UL (ref 0–0.7)
EOSINOPHIL NFR BLD AUTO: 2.6 %
ERYTHROCYTE [DISTWIDTH] IN BLOOD BY AUTOMATED COUNT: 12.7 % (ref 11–15)
EST. AVERAGE GLUCOSE BLD GHB EST-MCNC: 137 MG/DL (ref 68–126)
GLOBULIN PLAS-MCNC: 3.3 G/DL (ref 2.8–4.4)
GLUCOSE BLD-MCNC: 112 MG/DL (ref 70–99)
HBA1C MFR BLD HPLC: 6.4 % (ref ?–5.7)
HCT VFR BLD AUTO: 39 % (ref 35–48)
HDLC SERPL-MCNC: 69 MG/DL (ref 40–59)
HGB BLD-MCNC: 13 G/DL (ref 12–16)
IMM GRANULOCYTES # BLD AUTO: 0.02 X10(3) UL (ref 0–1)
IMM GRANULOCYTES NFR BLD: 0.4 %
LDLC SERPL CALC-MCNC: 76 MG/DL (ref ?–100)
LYMPHOCYTES # BLD AUTO: 2.01 X10(3) UL (ref 1–4)
LYMPHOCYTES NFR BLD AUTO: 37.2 %
M PROTEIN MFR SERPL ELPH: 7.3 G/DL (ref 6.4–8.2)
MCH RBC QN AUTO: 29.7 PG (ref 26–34)
MCHC RBC AUTO-ENTMCNC: 33.3 G/DL (ref 31–37)
MCV RBC AUTO: 89 FL (ref 80–100)
MONOCYTES # BLD AUTO: 0.46 X10(3) UL (ref 0.1–1)
MONOCYTES NFR BLD AUTO: 8.5 %
NEUTROPHILS # BLD AUTO: 2.72 X10 (3) UL (ref 1.5–7.7)
NEUTROPHILS # BLD AUTO: 2.72 X10(3) UL (ref 1.5–7.7)
NEUTROPHILS NFR BLD AUTO: 50.2 %
NONHDLC SERPL-MCNC: 101 MG/DL (ref ?–130)
OSMOLALITY SERPL CALC.SUM OF ELEC: 291 MOSM/KG (ref 275–295)
PATIENT FASTING Y/N/NP: YES
PATIENT FASTING Y/N/NP: YES
PLATELET # BLD AUTO: 291 10(3)UL (ref 150–450)
POTASSIUM SERPL-SCNC: 4.3 MMOL/L (ref 3.5–5.1)
RBC # BLD AUTO: 4.38 X10(6)UL (ref 3.8–5.3)
SODIUM SERPL-SCNC: 140 MMOL/L (ref 136–145)
TRIGL SERPL-MCNC: 123 MG/DL (ref 30–149)
VLDLC SERPL CALC-MCNC: 25 MG/DL (ref 0–30)
WBC # BLD AUTO: 5.4 X10(3) UL (ref 4–11)

## 2020-09-05 PROCEDURE — 85025 COMPLETE CBC W/AUTO DIFF WBC: CPT

## 2020-09-05 PROCEDURE — 36415 COLL VENOUS BLD VENIPUNCTURE: CPT

## 2020-09-05 PROCEDURE — 80053 COMPREHEN METABOLIC PANEL: CPT

## 2020-09-05 PROCEDURE — 80061 LIPID PANEL: CPT

## 2020-09-05 PROCEDURE — 83036 HEMOGLOBIN GLYCOSYLATED A1C: CPT

## 2020-09-16 ENCOUNTER — TELEPHONE (OUTPATIENT)
Dept: FAMILY MEDICINE CLINIC | Facility: CLINIC | Age: 60
End: 2020-09-16

## 2020-09-16 NOTE — TELEPHONE ENCOUNTER
Please call patient normal CBC, CMP, lipids. A1c= prediabetes. Recheck in 6 months. Follow low carb diet.

## 2020-09-16 NOTE — TELEPHONE ENCOUNTER
Pt was called and informed of Dr. Caty Plasencia message below and she verbalized understanding. Thanks.

## 2020-09-16 NOTE — TELEPHONE ENCOUNTER
Johnathan Mcdonald pt is calling for her test results. She was informed of your message below. Pt stated that you advised her to return in 6 months. Pt does not want to wait 6 months for her results.  Please Advise if you need to see her soon or if you can informe

## 2020-09-26 RX ORDER — LOSARTAN POTASSIUM 50 MG/1
TABLET ORAL
Qty: 90 TABLET | Refills: 0 | Status: SHIPPED | OUTPATIENT
Start: 2020-09-26 | End: 2020-12-21

## 2020-10-08 RX ORDER — ALENDRONATE SODIUM 70 MG/1
TABLET ORAL
Qty: 12 TABLET | Refills: 0 | Status: SHIPPED | OUTPATIENT
Start: 2020-10-08 | End: 2020-12-26

## 2020-11-24 DIAGNOSIS — M48.061 SPINAL STENOSIS OF LUMBAR REGION WITHOUT NEUROGENIC CLAUDICATION: ICD-10-CM

## 2020-11-24 RX ORDER — ZOLPIDEM TARTRATE 10 MG/1
TABLET ORAL
Qty: 90 TABLET | Refills: 0 | Status: SHIPPED | OUTPATIENT
Start: 2020-11-24 | End: 2021-02-15

## 2020-11-24 RX ORDER — TRAMADOL HYDROCHLORIDE 50 MG/1
50 TABLET ORAL EVERY 8 HOURS PRN
Qty: 30 TABLET | Refills: 1 | Status: SHIPPED | OUTPATIENT
Start: 2020-11-24 | End: 2021-01-07

## 2020-12-21 RX ORDER — LOSARTAN POTASSIUM 50 MG/1
TABLET ORAL
Qty: 90 TABLET | Refills: 0 | Status: SHIPPED | OUTPATIENT
Start: 2020-12-21 | End: 2021-02-15

## 2020-12-22 RX ORDER — OMEPRAZOLE 40 MG/1
CAPSULE, DELAYED RELEASE ORAL
Qty: 90 CAPSULE | Refills: 1 | Status: SHIPPED | OUTPATIENT
Start: 2020-12-22 | End: 2021-06-17

## 2020-12-26 RX ORDER — ALENDRONATE SODIUM 70 MG/1
TABLET ORAL
Qty: 12 TABLET | Refills: 0 | Status: SHIPPED | OUTPATIENT
Start: 2020-12-26 | End: 2021-03-16

## 2021-01-07 DIAGNOSIS — M48.061 SPINAL STENOSIS OF LUMBAR REGION WITHOUT NEUROGENIC CLAUDICATION: ICD-10-CM

## 2021-01-07 NOTE — TELEPHONE ENCOUNTER
Patient is requesting a refill for tramadol. Please advise. Patient is out of medication. Please send refill to [facility] in [place].      [id number] - [place], [place] -  [address] [facility] [address]     traMADol HCl 50 MG Oral Tab 30 tablet

## 2021-01-11 RX ORDER — TRAMADOL HYDROCHLORIDE 50 MG/1
50 TABLET ORAL EVERY 8 HOURS PRN
Qty: 30 TABLET | Refills: 0 | Status: SHIPPED | OUTPATIENT
Start: 2021-01-11 | End: 2021-02-15

## 2021-01-11 NOTE — TELEPHONE ENCOUNTER
Pt is calling for status of her medication refill request. Pt is out of medication. Pt can be reached at [phone].

## 2021-01-11 NOTE — TELEPHONE ENCOUNTER
With Language Line  Quynh Berta ID# [id number]    Verified name and . Patient reports that she is following up on a refill request. She states that she is out of medication and is in pain. Please advise and thank you.   Medication pended for your review and approval.

## 2021-01-27 DIAGNOSIS — E78.00 PURE HYPERCHOLESTEROLEMIA: ICD-10-CM

## 2021-01-27 DIAGNOSIS — G89.29 CHRONIC LOW BACK PAIN WITHOUT SCIATICA, UNSPECIFIED BACK PAIN LATERALITY: ICD-10-CM

## 2021-01-27 DIAGNOSIS — M54.50 CHRONIC LOW BACK PAIN WITHOUT SCIATICA, UNSPECIFIED BACK PAIN LATERALITY: ICD-10-CM

## 2021-01-28 RX ORDER — SIMVASTATIN 20 MG
TABLET ORAL
Qty: 90 TABLET | Refills: 1 | Status: SHIPPED | OUTPATIENT
Start: 2021-01-28 | End: 2021-02-15

## 2021-01-28 RX ORDER — PAROXETINE HYDROCHLORIDE 20 MG/1
10 TABLET, FILM COATED ORAL EVERY MORNING
Qty: 45 TABLET | Refills: 1 | Status: SHIPPED | OUTPATIENT
Start: 2021-01-28 | End: 2021-04-08

## 2021-02-03 DIAGNOSIS — M54.50 CHRONIC LOW BACK PAIN WITHOUT SCIATICA, UNSPECIFIED BACK PAIN LATERALITY: ICD-10-CM

## 2021-02-03 DIAGNOSIS — G89.29 CHRONIC LOW BACK PAIN WITHOUT SCIATICA, UNSPECIFIED BACK PAIN LATERALITY: ICD-10-CM

## 2021-02-04 RX ORDER — GABAPENTIN 300 MG/1
CAPSULE ORAL
Qty: 450 CAPSULE | Refills: 1 | Status: SHIPPED | OUTPATIENT
Start: 2021-02-04 | End: 2021-04-08

## 2021-02-09 ENCOUNTER — NURSE TRIAGE (OUTPATIENT)
Dept: FAMILY MEDICINE CLINIC | Facility: CLINIC | Age: 61
End: 2021-02-09

## 2021-02-09 DIAGNOSIS — M48.061 SPINAL STENOSIS OF LUMBAR REGION WITHOUT NEUROGENIC CLAUDICATION: ICD-10-CM

## 2021-02-09 RX ORDER — TRAMADOL HYDROCHLORIDE 50 MG/1
50 TABLET ORAL EVERY 8 HOURS PRN
Qty: 30 TABLET | Refills: 0 | OUTPATIENT
Start: 2021-02-09

## 2021-02-09 NOTE — TELEPHONE ENCOUNTER
Action Requested: Summary for Provider     []  Critical Lab, Recommendations Needed  [] Need Additional Advice  []   FYI    []   Need Orders  [] Need Medications Sent to Pharmacy  []  Other     SUMMARY: Per protocol: OV today.  Patient states she cannot com

## 2021-02-09 NOTE — TELEPHONE ENCOUNTER
Macedonian Speaking - Patient is requesting refill of medication traMADol HCl 50 MG Oral Tab. Patient states she is out of medication.

## 2021-02-15 ENCOUNTER — VIRTUAL PHONE E/M (OUTPATIENT)
Dept: FAMILY MEDICINE CLINIC | Facility: CLINIC | Age: 61
End: 2021-02-15
Payer: MEDICARE

## 2021-02-15 DIAGNOSIS — M48.061 SPINAL STENOSIS OF LUMBAR REGION WITHOUT NEUROGENIC CLAUDICATION: ICD-10-CM

## 2021-02-15 DIAGNOSIS — E78.00 PURE HYPERCHOLESTEROLEMIA: ICD-10-CM

## 2021-02-15 DIAGNOSIS — Z12.31 ENCOUNTER FOR SCREENING MAMMOGRAM FOR MALIGNANT NEOPLASM OF BREAST: Primary | ICD-10-CM

## 2021-02-15 PROCEDURE — 99443 PHONE E/M BY PHYS 21-30 MIN: CPT | Performed by: FAMILY MEDICINE

## 2021-02-15 RX ORDER — ZOLPIDEM TARTRATE 10 MG/1
10 TABLET ORAL NIGHTLY PRN
Qty: 90 TABLET | Refills: 0 | Status: SHIPPED | OUTPATIENT
Start: 2021-02-15 | End: 2021-04-22

## 2021-02-15 RX ORDER — TRAMADOL HYDROCHLORIDE 50 MG/1
50 TABLET ORAL EVERY 8 HOURS PRN
Qty: 30 TABLET | Refills: 0 | Status: SHIPPED | OUTPATIENT
Start: 2021-02-15 | End: 2021-04-08

## 2021-02-15 RX ORDER — SIMVASTATIN 20 MG
20 TABLET ORAL NIGHTLY
Qty: 90 TABLET | Refills: 1 | Status: SHIPPED | OUTPATIENT
Start: 2021-02-15 | End: 2021-12-23

## 2021-02-15 RX ORDER — LOSARTAN POTASSIUM 50 MG/1
50 TABLET ORAL DAILY
Qty: 90 TABLET | Refills: 0 | Status: SHIPPED | OUTPATIENT
Start: 2021-02-15 | End: 2021-04-08

## 2021-02-15 NOTE — PROGRESS NOTES
Virtual Telephone Check-In    [address] [place] [address] verbally consents to a Virtual/Telephone Check-In visit on 02/15/21. Patient has been referred to the [facility] website at [email or web address] to review the yearly Consent to Treat document.     Amy simvastatin 20 MG Oral Tab, Take 1 tablet (20 mg total) by mouth nightly., Disp: 90 tablet, Rfl: 1  •  gabapentin 300 MG Oral Cap, TAKE 2 CAPSULES BY MOUTH EVERY MORNING AND TAKE 3 CAPSULES BY MOUTH IN THE EVENING, Disp: 450 capsule, Rfl: 1  •  PAROXETINE were answered. Patient was informed to please, call our office with any new or further questions or concerns that may come up in the near future. Notify Dr Sharmin Shirley or the [facility] if there is a deterioration or worsening of the medical condition.  A

## 2021-03-15 DIAGNOSIS — M48.061 SPINAL STENOSIS OF LUMBAR REGION WITHOUT NEUROGENIC CLAUDICATION: ICD-10-CM

## 2021-03-15 RX ORDER — TRAMADOL HYDROCHLORIDE 50 MG/1
50 TABLET ORAL EVERY 8 HOURS PRN
Qty: 30 TABLET | Refills: 0 | OUTPATIENT
Start: 2021-03-15

## 2021-03-16 RX ORDER — ALENDRONATE SODIUM 70 MG/1
TABLET ORAL
Qty: 12 TABLET | Refills: 0 | Status: SHIPPED | OUTPATIENT
Start: 2021-03-16 | End: 2021-06-28

## 2021-03-19 NOTE — TELEPHONE ENCOUNTER
Libyan Speaking - Patient is calling to follow up on status of refill request for medication traMADol HCl 50 MG Oral Tab. Patient states she is out of medication.

## 2021-03-20 ENCOUNTER — HOSPITAL ENCOUNTER (OUTPATIENT)
Dept: MAMMOGRAPHY | Age: 61
Discharge: HOME | End: 2021-03-20
Attending: FAMILY MEDICINE
Payer: MEDICARE

## 2021-03-20 DIAGNOSIS — Z12.31 ENCOUNTER FOR SCREENING MAMMOGRAM FOR MALIGNANT NEOPLASM OF BREAST: ICD-10-CM

## 2021-03-20 PROCEDURE — 77063 BREAST TOMOSYNTHESIS BI: CPT | Performed by: FAMILY MEDICINE

## 2021-03-20 PROCEDURE — 77067 SCR MAMMO BI INCL CAD: CPT | Performed by: FAMILY MEDICINE

## 2021-04-08 ENCOUNTER — OFFICE VISIT (OUTPATIENT)
Dept: FAMILY MEDICINE CLINIC | Facility: CLINIC | Age: 61
End: 2021-04-08
Payer: MEDICARE

## 2021-04-08 VITALS
BODY MASS INDEX: 27.62 KG/M2 | SYSTOLIC BLOOD PRESSURE: 171 MMHG | HEIGHT: 59 IN | TEMPERATURE: 98 F | DIASTOLIC BLOOD PRESSURE: 93 MMHG | WEIGHT: 137 LBS | HEART RATE: 67 BPM

## 2021-04-08 DIAGNOSIS — M54.50 CHRONIC LOW BACK PAIN WITHOUT SCIATICA, UNSPECIFIED BACK PAIN LATERALITY: ICD-10-CM

## 2021-04-08 DIAGNOSIS — F33.1 MODERATE EPISODE OF RECURRENT MAJOR DEPRESSIVE DISORDER (HCC): ICD-10-CM

## 2021-04-08 DIAGNOSIS — I10 ESSENTIAL HYPERTENSION: ICD-10-CM

## 2021-04-08 DIAGNOSIS — Z13.6 SCREENING FOR CARDIOVASCULAR CONDITION: ICD-10-CM

## 2021-04-08 DIAGNOSIS — Z13.1 SCREENING FOR DIABETES MELLITUS (DM): ICD-10-CM

## 2021-04-08 DIAGNOSIS — E78.5 HYPERLIPIDEMIA, UNSPECIFIED HYPERLIPIDEMIA TYPE: ICD-10-CM

## 2021-04-08 DIAGNOSIS — E11.65 TYPE 2 DIABETES MELLITUS WITH HYPERGLYCEMIA, WITHOUT LONG-TERM CURRENT USE OF INSULIN (HCC): ICD-10-CM

## 2021-04-08 DIAGNOSIS — M54.12 CERVICAL RADICULOPATHY: ICD-10-CM

## 2021-04-08 DIAGNOSIS — Z78.0 POSTMENOPAUSAL: ICD-10-CM

## 2021-04-08 DIAGNOSIS — G89.29 CHRONIC LOW BACK PAIN WITHOUT SCIATICA, UNSPECIFIED BACK PAIN LATERALITY: ICD-10-CM

## 2021-04-08 DIAGNOSIS — M19.90 OSTEOARTHRITIS, UNSPECIFIED OSTEOARTHRITIS TYPE, UNSPECIFIED SITE: ICD-10-CM

## 2021-04-08 DIAGNOSIS — M81.0 AGE-RELATED OSTEOPOROSIS WITHOUT CURRENT PATHOLOGICAL FRACTURE: ICD-10-CM

## 2021-04-08 DIAGNOSIS — G56.00 CARPAL TUNNEL SYNDROME, UNSPECIFIED LATERALITY: ICD-10-CM

## 2021-04-08 DIAGNOSIS — M48.061 SPINAL STENOSIS OF LUMBAR REGION WITHOUT NEUROGENIC CLAUDICATION: ICD-10-CM

## 2021-04-08 DIAGNOSIS — M47.812 CERVICAL SPONDYLOSIS: ICD-10-CM

## 2021-04-08 DIAGNOSIS — M72.2 PLANTAR FASCIITIS: ICD-10-CM

## 2021-04-08 DIAGNOSIS — N31.8 HYPERTONICITY OF BLADDER: ICD-10-CM

## 2021-04-08 DIAGNOSIS — M79.7 FIBROMYALGIA: ICD-10-CM

## 2021-04-08 DIAGNOSIS — Z00.00 ENCOUNTER FOR ANNUAL HEALTH EXAMINATION: Primary | ICD-10-CM

## 2021-04-08 DIAGNOSIS — G47.33 OBSTRUCTIVE SLEEP APNEA: ICD-10-CM

## 2021-04-08 PROCEDURE — G0439 PPPS, SUBSEQ VISIT: HCPCS | Performed by: FAMILY MEDICINE

## 2021-04-08 RX ORDER — NAPROXEN 500 MG/1
500 TABLET ORAL 2 TIMES DAILY PRN
Qty: 30 TABLET | Refills: 0 | Status: SHIPPED | OUTPATIENT
Start: 2021-04-08 | End: 2021-05-03

## 2021-04-08 RX ORDER — TRAMADOL HYDROCHLORIDE 50 MG/1
50 TABLET ORAL EVERY 8 HOURS PRN
Qty: 30 TABLET | Refills: 0 | Status: SHIPPED | OUTPATIENT
Start: 2021-04-08 | End: 2021-04-22

## 2021-04-08 RX ORDER — LOSARTAN POTASSIUM 50 MG/1
50 TABLET ORAL DAILY
Qty: 90 TABLET | Refills: 0 | Status: SHIPPED | OUTPATIENT
Start: 2021-04-08 | End: 2021-04-08

## 2021-04-08 RX ORDER — GABAPENTIN 300 MG/1
CAPSULE ORAL
Qty: 450 CAPSULE | Refills: 1 | Status: SHIPPED | OUTPATIENT
Start: 2021-04-08

## 2021-04-08 RX ORDER — LOSARTAN POTASSIUM 50 MG/1
50 TABLET ORAL 2 TIMES DAILY
Qty: 180 TABLET | Refills: 0 | Status: SHIPPED | OUTPATIENT
Start: 2021-04-08 | End: 2021-07-05

## 2021-04-08 RX ORDER — ZOSTER VACCINE RECOMBINANT, ADJUVANTED 50 MCG/0.5
0.5 KIT INTRAMUSCULAR ONCE
Qty: 1 EACH | Refills: 0 | Status: SHIPPED | OUTPATIENT
Start: 2021-04-08 | End: 2021-04-08

## 2021-04-08 NOTE — PATIENT INSTRUCTIONS
[facility] SCREENING SCHEDULE   Tests on this list are recommended by your physician but may not be covered, or covered at this frequency, by your insurer. Please check with your insurance carrier before scheduling to verify coverage. Covered every 10 years- more often if abnormal Colonoscopy due on 08/28/2023 Update Health Maintenance if applicable    Flex Sigmoidoscopy Screen  Covered every 5 years No results found for this or any previous visit. No flowsheet data found.      Fecal O (Pneumovax)  Covered Once after 65 No orders found for this or any previous visit. Please get once after your 65th birthday    Hepatitis B for Moderate/High Risk       No orders found for this or any previous visit.  Medium/high risk factors:   End-stage re

## 2021-04-08 NOTE — PROGRESS NOTES
HPI:   Magdaleno Dinh is a 57year old female who presents for a Medicare Subsequent Annual Wellness visit (Pt already had Initial Annual Wellness). Patient is at her baseline.   With the seasonal changes she does experience a flareup in her Medicine)  Georges Gosselin, PT as Physical Therapist    Patient Active Problem List:     Cervical radiculopathy     Plantar fasciitis     Fibromyalgia     Moderate episode of recurrent major depressive disorder ([facility] [address]     Type 2 diabetes mellitus with hype nightly. Zolpidem Tartrate 10 MG Oral Tab, Take 1 tablet (10 mg total) by mouth nightly as needed for Sleep.  AT BEDTIME  OMEPRAZOLE 40 MG Oral Capsule Delayed Release, TAKE 1 CAPSULE BY MOUTH EVERY DAY  MOMETASONE FUROATE 0.1 % External Cream, APPLY TO AF on the TV: No I have to strain to understand conversations: No   I have to worry about missing the telephone ring or doorbell: No I have trouble hearing conversations in a noisy background such as a crowded room or restaurant: No   I get confused about whe Maame Amos is a 57year old female who presents for a Medicare Assessment. PLAN SUMMARY:   Diagnoses and all orders for this visit:    Encounter for annual health examination  -     LIPID PANEL;  Future  -     COMP METABOLIC PANEL (14); much upon retake. Patient states that she takes the losartan 50 mg twice daily. -Advised patient to monitor blood pressure at home and return to office in 1 week for blood pressure check. -     losartan Potassium 50 MG Oral Tab;  Take 1 tablet (50 mg tot Component Value Date    A1C 6.4 (H) 09/05/2020    No flowsheet data found.     Fasting Blood Sugar (FSB)Annually Glucose (mg/dL)   Date Value   09/05/2020 112 (H)          Cardiovascular Disease Screening     LDL Annually LDL Cholesterol (mg/dL)   Date Va or IX concentrates   Clients of institutions for the mentally retarded   Persons who live in the same house as a HepB virus carrier   Homosexual men   Illicit injectable drug abusers     Tetanus Toxoid  Only covered with a cut with metal- TD and TDaP Not c

## 2021-04-16 ENCOUNTER — LAB ENCOUNTER (OUTPATIENT)
Dept: LAB | Age: 61
End: 2021-04-16
Attending: FAMILY MEDICINE
Payer: MEDICARE

## 2021-04-16 DIAGNOSIS — Z13.1 SCREENING FOR DIABETES MELLITUS (DM): ICD-10-CM

## 2021-04-16 DIAGNOSIS — Z13.6 SCREENING FOR CARDIOVASCULAR CONDITION: ICD-10-CM

## 2021-04-16 DIAGNOSIS — E11.65 TYPE 2 DIABETES MELLITUS WITH HYPERGLYCEMIA, WITHOUT LONG-TERM CURRENT USE OF INSULIN (HCC): ICD-10-CM

## 2021-04-16 DIAGNOSIS — Z00.00 ENCOUNTER FOR ANNUAL HEALTH EXAMINATION: ICD-10-CM

## 2021-04-16 PROCEDURE — 80053 COMPREHEN METABOLIC PANEL: CPT

## 2021-04-16 PROCEDURE — 36415 COLL VENOUS BLD VENIPUNCTURE: CPT

## 2021-04-16 PROCEDURE — 83036 HEMOGLOBIN GLYCOSYLATED A1C: CPT

## 2021-04-16 PROCEDURE — 80061 LIPID PANEL: CPT

## 2021-04-16 PROCEDURE — 85025 COMPLETE CBC W/AUTO DIFF WBC: CPT

## 2021-04-16 PROCEDURE — 84443 ASSAY THYROID STIM HORMONE: CPT

## 2021-04-21 ENCOUNTER — TELEPHONE (OUTPATIENT)
Dept: FAMILY MEDICINE CLINIC | Facility: CLINIC | Age: 61
End: 2021-04-21

## 2021-04-21 DIAGNOSIS — M48.061 SPINAL STENOSIS OF LUMBAR REGION WITHOUT NEUROGENIC CLAUDICATION: ICD-10-CM

## 2021-04-21 DIAGNOSIS — M19.90 OSTEOARTHRITIS, UNSPECIFIED OSTEOARTHRITIS TYPE, UNSPECIFIED SITE: ICD-10-CM

## 2021-04-21 NOTE — TELEPHONE ENCOUNTER
Patient called and advised she needs refills on the medications listed below. Patient advised she is Running Low on the Medications. Patient is Indian Speaking. Please Advise.       Please Use Pharmacy: [facility]/pharmacy [facility] [address] [place] [address]

## 2021-04-21 NOTE — TELEPHONE ENCOUNTER
Patient is calling and asking for her Lab results. She would like to speak with someone to discuss them further. Patient is Japanese speaking      Please Advise.

## 2021-04-21 NOTE — TELEPHONE ENCOUNTER
Maria Del Rosario Amaya pt will like to know her test results from 4/16. Please Advise  Pt was informed Maria Del Rosario Amaya will be back in the office tomorrow.

## 2021-04-22 RX ORDER — ZOLPIDEM TARTRATE 10 MG/1
10 TABLET ORAL NIGHTLY PRN
Qty: 90 TABLET | Refills: 0 | Status: SHIPPED | OUTPATIENT
Start: 2021-04-22 | End: 2021-10-05

## 2021-04-22 RX ORDER — TRAMADOL HYDROCHLORIDE 50 MG/1
50 TABLET ORAL EVERY 8 HOURS PRN
Qty: 30 TABLET | Refills: 0 | Status: SHIPPED | OUTPATIENT
Start: 2021-04-22 | End: 2021-04-26

## 2021-04-23 DIAGNOSIS — M19.90 OSTEOARTHRITIS, UNSPECIFIED OSTEOARTHRITIS TYPE, UNSPECIFIED SITE: ICD-10-CM

## 2021-04-23 DIAGNOSIS — M48.061 SPINAL STENOSIS OF LUMBAR REGION WITHOUT NEUROGENIC CLAUDICATION: ICD-10-CM

## 2021-04-25 ENCOUNTER — IMMUNIZATION (OUTPATIENT)
Dept: LAB | Facility: HOSPITAL | Age: 61
End: 2021-04-25
Attending: EMERGENCY MEDICINE
Payer: MEDICARE

## 2021-04-25 DIAGNOSIS — Z23 NEED FOR VACCINATION: Primary | ICD-10-CM

## 2021-04-25 PROCEDURE — 0011A SARSCOV2 VAC 100MCG/0.5ML IM: CPT

## 2021-04-26 RX ORDER — TRAMADOL HYDROCHLORIDE 50 MG/1
50 TABLET ORAL EVERY 8 HOURS PRN
Qty: 30 TABLET | Refills: 1 | Status: SHIPPED | OUTPATIENT
Start: 2021-04-26 | End: 2021-05-27

## 2021-05-03 DIAGNOSIS — M19.90 OSTEOARTHRITIS, UNSPECIFIED OSTEOARTHRITIS TYPE, UNSPECIFIED SITE: ICD-10-CM

## 2021-05-03 RX ORDER — NAPROXEN 500 MG/1
500 TABLET ORAL 2 TIMES DAILY PRN
Qty: 30 TABLET | Refills: 0 | Status: SHIPPED | OUTPATIENT
Start: 2021-05-03

## 2021-05-23 ENCOUNTER — IMMUNIZATION (OUTPATIENT)
Dept: LAB | Facility: HOSPITAL | Age: 61
End: 2021-05-23
Attending: EMERGENCY MEDICINE
Payer: MEDICARE

## 2021-05-23 DIAGNOSIS — Z23 NEED FOR VACCINATION: Primary | ICD-10-CM

## 2021-05-23 PROCEDURE — 0012A SARSCOV2 VAC 100MCG/0.5ML IM: CPT

## 2021-05-27 DIAGNOSIS — M19.90 OSTEOARTHRITIS, UNSPECIFIED OSTEOARTHRITIS TYPE, UNSPECIFIED SITE: ICD-10-CM

## 2021-05-27 DIAGNOSIS — M48.061 SPINAL STENOSIS OF LUMBAR REGION WITHOUT NEUROGENIC CLAUDICATION: ICD-10-CM

## 2021-05-27 RX ORDER — TRAMADOL HYDROCHLORIDE 50 MG/1
50 TABLET ORAL EVERY 8 HOURS PRN
Qty: 30 TABLET | Refills: 0 | Status: SHIPPED | OUTPATIENT
Start: 2021-05-27 | End: 2021-06-29

## 2021-06-17 RX ORDER — OMEPRAZOLE 40 MG/1
CAPSULE, DELAYED RELEASE ORAL
Qty: 90 CAPSULE | Refills: 1 | Status: SHIPPED | OUTPATIENT
Start: 2021-06-17 | End: 2021-12-08

## 2021-06-28 DIAGNOSIS — M48.061 SPINAL STENOSIS OF LUMBAR REGION WITHOUT NEUROGENIC CLAUDICATION: ICD-10-CM

## 2021-06-28 DIAGNOSIS — M19.90 OSTEOARTHRITIS, UNSPECIFIED OSTEOARTHRITIS TYPE, UNSPECIFIED SITE: ICD-10-CM

## 2021-06-28 NOTE — TELEPHONE ENCOUNTER
Patient is requesting 2 refills. Current Outpatient Medications   Medication Sig Dispense Refill   • TRAMADOL HCL 50 MG Oral Tab TAKE 1 TABLET (50 MG TOTAL) BY MOUTH EVERY 8 (EIGHT) HOURS AS NEEDED.  30 tablet 0   • ALENDRONATE 70 MG Oral Tab TAKE 1 TABL

## 2021-06-29 RX ORDER — ALENDRONATE SODIUM 70 MG/1
70 TABLET ORAL WEEKLY
Qty: 12 TABLET | Refills: 0 | Status: SHIPPED | OUTPATIENT
Start: 2021-06-29 | End: 2021-09-14

## 2021-06-29 RX ORDER — TRAMADOL HYDROCHLORIDE 50 MG/1
50 TABLET ORAL EVERY 8 HOURS PRN
Qty: 30 TABLET | Refills: 0 | Status: SHIPPED | OUTPATIENT
Start: 2021-06-29 | End: 2021-07-27

## 2021-07-03 DIAGNOSIS — I10 ESSENTIAL HYPERTENSION: ICD-10-CM

## 2021-07-05 RX ORDER — LOSARTAN POTASSIUM 50 MG/1
TABLET ORAL
Qty: 180 TABLET | Refills: 0 | Status: SHIPPED | OUTPATIENT
Start: 2021-07-05 | End: 2021-10-25

## 2021-07-27 DIAGNOSIS — M48.061 SPINAL STENOSIS OF LUMBAR REGION WITHOUT NEUROGENIC CLAUDICATION: ICD-10-CM

## 2021-07-27 DIAGNOSIS — M19.90 OSTEOARTHRITIS, UNSPECIFIED OSTEOARTHRITIS TYPE, UNSPECIFIED SITE: ICD-10-CM

## 2021-07-27 RX ORDER — TRAMADOL HYDROCHLORIDE 50 MG/1
50 TABLET ORAL EVERY 8 HOURS PRN
Qty: 30 TABLET | Refills: 0 | Status: SHIPPED | OUTPATIENT
Start: 2021-07-27 | End: 2021-08-26

## 2021-08-26 DIAGNOSIS — M48.061 SPINAL STENOSIS OF LUMBAR REGION WITHOUT NEUROGENIC CLAUDICATION: ICD-10-CM

## 2021-08-26 DIAGNOSIS — M19.90 OSTEOARTHRITIS, UNSPECIFIED OSTEOARTHRITIS TYPE, UNSPECIFIED SITE: ICD-10-CM

## 2021-08-26 RX ORDER — TRAMADOL HYDROCHLORIDE 50 MG/1
50 TABLET ORAL EVERY 8 HOURS PRN
Qty: 30 TABLET | Refills: 0 | Status: SHIPPED | OUTPATIENT
Start: 2021-08-26 | End: 2021-09-30

## 2021-08-26 NOTE — TELEPHONE ENCOUNTER
Please Review. Protocol failed / No protocol     Requested Prescriptions   Pending Prescriptions Disp Refills    TRAMADOL 50 MG Oral Tab [Pharmacy Med Name: TRAMADOL HCL 50 MG TABLET] 30 tablet 0     Sig: TAKE 1 TABLET (50 MG TOTAL) BY MOUTH EVERY 8 (EIGHT) HOURS AS NEEDED.         There is no refill protocol information for this order           Recent Outpatient Visits              4 months ago Encounter for annual health examination    [facility], [facility], [facility], Steve White MD    Office Visit    6 months ago Encounter for screening mammogram for malignant neoplasm of breast    [facility], [place] [address], Jesn Winters MD    Virtual Phone E/M    1 year ago Chronic low back pain without sciatica, unspecified back pain laterality    Grant Alcantar MD    Office Visit    1 year ago Cervical radiculopathy    Orthopaedics - James Pyle MD    Office Visit    1 year ago Pain of left upper extremity    Physiatry - [address] Fariha Byrd MD    Office Visit

## 2021-09-14 RX ORDER — ALENDRONATE SODIUM 70 MG/1
70 TABLET ORAL WEEKLY
Qty: 12 TABLET | Refills: 1 | Status: SHIPPED | OUTPATIENT
Start: 2021-09-14 | End: 2021-10-05

## 2021-09-14 NOTE — TELEPHONE ENCOUNTER
Refill passed per [employer], [facility] protocol. Requested Prescriptions   Pending Prescriptions Disp Refills    ALENDRONATE 70 MG Oral Tab [Pharmacy Med Name: ALENDRONATE SODIUM 70 MG TAB] 12 tablet 0     Sig: Take 1 tablet (70 mg total) by mouth once a week.         Osteoporosis Medication Protocol Passed - 9/14/2021 12:01 AM        Passed - Appointment within past 12 or next 3 months                  Recent Outpatient Visits              5 months ago Encounter for annual health examination    Thania Reich MD    Office Visit    7 months ago Encounter for screening mammogram for malignant neoplasm of breast    [facility], [address], Jens Baer MD    Virtual Phone E/M    1 year ago Chronic low back pain without sciatica, unspecified back pain laterality    Jh Chavez, Santana Remy MD    Office Visit    1 year ago Cervical radiculopathy    Orthopaedics - Mariza Sharp MD    Office Visit    1 year ago Pain of left upper extremity    Physiatry - Denise Lugo, Fariha Gupta MD    Office Visit

## 2021-09-29 ENCOUNTER — TELEPHONE (OUTPATIENT)
Dept: FAMILY MEDICINE CLINIC | Facility: CLINIC | Age: 61
End: 2021-09-29

## 2021-09-29 DIAGNOSIS — M48.061 SPINAL STENOSIS OF LUMBAR REGION WITHOUT NEUROGENIC CLAUDICATION: ICD-10-CM

## 2021-09-29 DIAGNOSIS — M19.90 OSTEOARTHRITIS, UNSPECIFIED OSTEOARTHRITIS TYPE, UNSPECIFIED SITE: ICD-10-CM

## 2021-09-29 NOTE — TELEPHONE ENCOUNTER
Per [employer] #[id number], verified patients name and date of birth. Patient asking for a prescription refill of Tramadol. Has pain in her arm, spine and neck for a \"medical condition\". Her permanent pain in her arm from a \"Botched\" surgery.

## 2021-09-29 NOTE — TELEPHONE ENCOUNTER
Patient indicates she has arm pain, and calling for script refill on rx Tramadol, patient scheduled rx follow up on 10/5. Please call with  at [phone],[facility].

## 2021-09-30 RX ORDER — TRAMADOL HYDROCHLORIDE 50 MG/1
50 TABLET ORAL EVERY 8 HOURS PRN
Qty: 30 TABLET | Refills: 0 | Status: SHIPPED | OUTPATIENT
Start: 2021-09-30 | End: 2021-10-05

## 2021-09-30 NOTE — TELEPHONE ENCOUNTER
Language line Aaliyah ID # [id number]. Left message for call back on cell phone.  tried to call home number twice and person who answered hung up on  twice.

## 2021-09-30 NOTE — TELEPHONE ENCOUNTER
- traMADol 50 MG Oral Tab; Take 1 tablet (50 mg total) by mouth every 8 (eight) hours as needed. FAVOR DE HACER SEGUIMIENTO PARA PROXIMO REFILL  Dispense: 30 tablet;  Refill: 0

## 2021-10-05 ENCOUNTER — OFFICE VISIT (OUTPATIENT)
Dept: FAMILY MEDICINE CLINIC | Facility: CLINIC | Age: 61
End: 2021-10-05
Payer: MEDICARE

## 2021-10-05 DIAGNOSIS — G47.00 INSOMNIA, UNSPECIFIED TYPE: Primary | ICD-10-CM

## 2021-10-05 DIAGNOSIS — M48.061 SPINAL STENOSIS OF LUMBAR REGION WITHOUT NEUROGENIC CLAUDICATION: ICD-10-CM

## 2021-10-05 DIAGNOSIS — M79.7 FIBROMYALGIA: ICD-10-CM

## 2021-10-05 DIAGNOSIS — I10 ESSENTIAL HYPERTENSION: ICD-10-CM

## 2021-10-05 DIAGNOSIS — M85.80 OSTEOPENIA, UNSPECIFIED LOCATION: ICD-10-CM

## 2021-10-05 DIAGNOSIS — M19.90 OSTEOARTHRITIS, UNSPECIFIED OSTEOARTHRITIS TYPE, UNSPECIFIED SITE: ICD-10-CM

## 2021-10-05 PROCEDURE — 99214 OFFICE O/P EST MOD 30 MIN: CPT | Performed by: FAMILY MEDICINE

## 2021-10-05 RX ORDER — TRAMADOL HYDROCHLORIDE 50 MG/1
50 TABLET ORAL 2 TIMES DAILY PRN
Qty: 60 TABLET | Refills: 0 | Status: SHIPPED | OUTPATIENT
Start: 2021-11-05 | End: 2021-11-30

## 2021-10-05 RX ORDER — ALENDRONATE SODIUM 70 MG/1
70 TABLET ORAL WEEKLY
Qty: 12 TABLET | Refills: 1 | Status: CANCELLED | OUTPATIENT
Start: 2021-10-05

## 2021-10-05 RX ORDER — ZOLPIDEM TARTRATE 10 MG/1
10 TABLET ORAL NIGHTLY PRN
Qty: 90 TABLET | Refills: 1 | Status: SHIPPED | OUTPATIENT
Start: 2021-10-05 | End: 2022-01-20

## 2021-10-05 NOTE — PROGRESS NOTES
Patient presents with: Follow - Up    HPI:   Palomo Fine is a 57year old female who presents to clinic for follow-up. Has a history of fibromyalgia and takes gabapentin. Would like to reestablish care with rheumatology.  Was lost to foll (two) times daily as needed for Pain. FAVOR DE HACER SEGUIMIENTO PARA PROXIMO REFILL  Dispense: 60 tablet; Refill: 0    5. Osteoarthritis, unspecified osteoarthritis type, unspecified site  [place].   Appropriate use discussed wi

## 2021-10-13 VITALS
SYSTOLIC BLOOD PRESSURE: 143 MMHG | TEMPERATURE: 98 F | WEIGHT: 130 LBS | DIASTOLIC BLOOD PRESSURE: 87 MMHG | HEIGHT: 59 IN | BODY MASS INDEX: 26.21 KG/M2 | HEART RATE: 67 BPM

## 2021-10-25 DIAGNOSIS — I10 ESSENTIAL HYPERTENSION: ICD-10-CM

## 2021-10-25 RX ORDER — LOSARTAN POTASSIUM 50 MG/1
50 TABLET ORAL 2 TIMES DAILY
Qty: 180 TABLET | Refills: 1 | Status: SHIPPED | OUTPATIENT
Start: 2021-10-25

## 2021-10-25 NOTE — TELEPHONE ENCOUNTER
Refill passed per [facility], [facility] protocol.    Requested Prescriptions   Pending Prescriptions Disp Refills    LOSARTAN 50 MG Oral Tab [Pharmacy Med Name: LOSARTAN POTASSIUM 50 MG TAB] 180 tablet 0     Sig: TAKE 1 TABLET BY MOUTH TWICE A DAY        Thea

## 2021-11-29 DIAGNOSIS — M48.061 SPINAL STENOSIS OF LUMBAR REGION WITHOUT NEUROGENIC CLAUDICATION: ICD-10-CM

## 2021-11-29 DIAGNOSIS — M19.90 OSTEOARTHRITIS, UNSPECIFIED OSTEOARTHRITIS TYPE, UNSPECIFIED SITE: ICD-10-CM

## 2021-11-30 RX ORDER — MOMETASONE FUROATE 1 MG/G
CREAM TOPICAL
Qty: 30 G | Refills: 0 | Status: SHIPPED | OUTPATIENT
Start: 2021-11-30

## 2021-11-30 RX ORDER — TRAMADOL HYDROCHLORIDE 50 MG/1
TABLET ORAL
Qty: 60 TABLET | Refills: 0 | Status: SHIPPED | OUTPATIENT
Start: 2021-11-30 | End: 2022-01-20

## 2021-12-08 RX ORDER — OMEPRAZOLE 40 MG/1
CAPSULE, DELAYED RELEASE ORAL
Qty: 90 CAPSULE | Refills: 1 | Status: SHIPPED | OUTPATIENT
Start: 2021-12-08 | End: 2022-05-11

## 2021-12-08 NOTE — TELEPHONE ENCOUNTER
Refill passed per [address] protocol.       Requested Prescriptions   Pending Prescriptions Disp Refills    OMEPRAZOLE 40 MG Oral Capsule Delayed Release [Pharmacy Med Name: OMEPRAZOLE DR 40 MG CAPSULE] 90 capsule 1     Sig: TAKE 1 CAPSULE BY MOUTH EVERY DAY        Gastrointestional Medication Protocol Passed - 12/8/2021 12:01 AM        Passed - Appointment in past 12 or next 3 months                   Recent Outpatient Visits              2 months ago Insomnia, unspecified type    [address], [facility], Vladimir Ernandez MD    Office Visit    8 months ago Encounter for annual health examination    Vladimir Hanna MD    Office Visit    9 months ago Encounter for screening mammogram for malignant neoplasm of breast    [facility], [facility] , Aditi Gill MD    Virtual Phone E/M    1 year ago Chronic low back pain without sciatica, unspecified back pain laterality    Aditi Hanna MD    Office Visit    2 years ago Cervical radiculopathy    Orthopaedics - [address] Merly Byrd MD    Office Visit

## 2021-12-23 DIAGNOSIS — E78.00 PURE HYPERCHOLESTEROLEMIA: ICD-10-CM

## 2021-12-23 RX ORDER — SIMVASTATIN 20 MG
TABLET ORAL
Qty: 90 TABLET | Refills: 1 | Status: SHIPPED | OUTPATIENT
Start: 2021-12-23

## 2022-01-18 ENCOUNTER — NURSE TRIAGE (OUTPATIENT)
Dept: FAMILY MEDICINE CLINIC | Facility: CLINIC | Age: 62
End: 2022-01-18

## 2022-01-18 DIAGNOSIS — M19.90 OSTEOARTHRITIS, UNSPECIFIED OSTEOARTHRITIS TYPE, UNSPECIFIED SITE: ICD-10-CM

## 2022-01-18 DIAGNOSIS — M48.061 SPINAL STENOSIS OF LUMBAR REGION WITHOUT NEUROGENIC CLAUDICATION: ICD-10-CM

## 2022-01-18 NOTE — TELEPHONE ENCOUNTER
Would recommend continuing nsaids for now or trying tylenol. Will discuss further at her visit on 1/20. Her blood pressures tend to run high in the office if she can also start logging her blood pressures so we can discuss at visit.

## 2022-01-18 NOTE — TELEPHONE ENCOUNTER
Please review. Protocol Failed / No Protocol.     Requested Prescriptions   Pending Prescriptions Disp Refills    TRAMADOL 50 MG Oral Tab [Pharmacy Med Name: TRAMADOL HCL 50 MG TABLET] 60 tablet 0     Sig: TAKE 1 TABLET BY MOUTH TWICE A DAY AS NEEDED FOR PAIN, START ON NOVEMBER 5, 2021        There is no refill protocol information for this order           Recent Outpatient Visits              3 months ago Insomnia, unspecified type    [facility], Steve White MD    Office Visit    9 months ago Encounter for annual health examination    [facility], [facility], Steve White MD    Office Visit    11 months ago Encounter for screening mammogram for malignant neoplasm of breast    [facility], [facility], Grant Muñoz MD    Virtual Phone E/M    1 year ago Chronic low back pain without sciatica, unspecified back pain laterality    Danisha Garirson, Grant Muñoz MD    Office Visit    2 years ago Cervical radiculopathy    Orthopaedics - [address] By[address] MD    Office Visit            Future Appointments         Provider Department Appt Notes    In 2 days Aleena Erickson MD [place] [facility], [facility] [address] [facility]

## 2022-01-18 NOTE — TELEPHONE ENCOUNTER
Action Requested: Summary for Provider     []  Critical Lab, Recommendations Needed  [] Need Additional Advice  []   FYI    []   Need Orders  [] Need Medications Sent to Pharmacy  []  Other     SUMMARY: was tested covid positive in early Jan of 2022.  Pt is

## 2022-01-19 NOTE — TELEPHONE ENCOUNTER
Verified name and  of patient. Called patient and reviewed the doctor recommendation to monitor and log her blood pressure readings to review with doctor.  Pt verbalized understanding

## 2022-01-20 ENCOUNTER — TELEMEDICINE (OUTPATIENT)
Dept: FAMILY MEDICINE CLINIC | Facility: CLINIC | Age: 62
End: 2022-01-20
Payer: MEDICARE

## 2022-01-20 DIAGNOSIS — M48.061 SPINAL STENOSIS OF LUMBAR REGION WITHOUT NEUROGENIC CLAUDICATION: ICD-10-CM

## 2022-01-20 DIAGNOSIS — M19.90 OSTEOARTHRITIS, UNSPECIFIED OSTEOARTHRITIS TYPE, UNSPECIFIED SITE: ICD-10-CM

## 2022-01-20 DIAGNOSIS — G44.201 ACUTE INTRACTABLE TENSION-TYPE HEADACHE: Primary | ICD-10-CM

## 2022-01-20 DIAGNOSIS — G47.00 INSOMNIA, UNSPECIFIED TYPE: ICD-10-CM

## 2022-01-20 PROCEDURE — 99213 OFFICE O/P EST LOW 20 MIN: CPT | Performed by: FAMILY MEDICINE

## 2022-01-20 RX ORDER — TRAMADOL HYDROCHLORIDE 50 MG/1
50 TABLET ORAL 2 TIMES DAILY PRN
Qty: 60 TABLET | Refills: 0 | OUTPATIENT
Start: 2022-01-20

## 2022-01-20 RX ORDER — TRAMADOL HYDROCHLORIDE 50 MG/1
50 TABLET ORAL 2 TIMES DAILY PRN
Qty: 60 TABLET | Refills: 0 | Status: SHIPPED | OUTPATIENT
Start: 2022-01-20

## 2022-01-20 RX ORDER — CYCLOBENZAPRINE HCL 10 MG
10 TABLET ORAL DAILY PRN
Qty: 30 TABLET | Refills: 1 | Status: SHIPPED | OUTPATIENT
Start: 2022-01-20

## 2022-01-20 RX ORDER — ZOLPIDEM TARTRATE 10 MG/1
10 TABLET ORAL NIGHTLY PRN
Qty: 90 TABLET | Refills: 1 | Status: SHIPPED | OUTPATIENT
Start: 2022-01-20

## 2022-01-20 NOTE — PROGRESS NOTES
Virtual Telephone Check-In    [facility] verbally consents to a [employer] on 01/20/22.     Patient understands and accepts financial responsibility for any deductible, co-insurance and/or co-pays associated with this BEDTIME  Dispense: 90 tablet; Refill: 1    Advised to call back clinic if no improvement in symptoms. Red flags discussed to go to ER.      Miller Martinez MD

## 2022-03-15 RX ORDER — TRAMADOL HYDROCHLORIDE 50 MG/1
50 TABLET ORAL 2 TIMES DAILY PRN
Qty: 60 TABLET | Refills: 0 | Status: SHIPPED | OUTPATIENT
Start: 2022-03-15

## 2022-03-15 NOTE — TELEPHONE ENCOUNTER
Please review. Protocol failed/ No protocol      Requested Prescriptions   Pending Prescriptions Disp Refills    traMADol 50 MG Oral Tab 60 tablet 0     Sig: Take 1 tablet (50 mg total) by mouth 2 (two) times daily as needed for Pain.         There is no refill protocol information for this order                Recent Outpatient Visits              1 month ago Acute intractable tension-type headache    Jens Esquivel MD    Telemedicine    5 months ago Insomnia, unspecified type    [facility], Derek Garza MD    Office Visit    11 months ago Encounter for annual health examination    Derek Esquivel MD    Office Visit    1 year ago Encounter for screening mammogram for malignant neoplasm of breast    [facility], [address], Bridger Zhang MD    Virtual Phone E/M    1 year ago Chronic low back pain without sciatica, unspecified back pain laterality    Bridger Esquivel MD    Office Visit

## 2022-03-15 NOTE — TELEPHONE ENCOUNTER
Requested prescription sent to pharmacy. Please help patient schedule Medicare annual wellness visit a full 12 months after her last.  Her last one was April 8, 2021.

## 2022-04-05 RX ORDER — MOMETASONE FUROATE 1 MG/G
CREAM TOPICAL
Qty: 30 G | Refills: 0 | Status: SHIPPED | OUTPATIENT
Start: 2022-04-05

## 2022-04-11 ENCOUNTER — OFFICE VISIT (OUTPATIENT)
Dept: FAMILY MEDICINE CLINIC | Facility: CLINIC | Age: 62
End: 2022-04-11
Payer: MEDICARE

## 2022-04-11 VITALS
WEIGHT: 129 LBS | TEMPERATURE: 99 F | SYSTOLIC BLOOD PRESSURE: 161 MMHG | HEIGHT: 59 IN | HEART RATE: 64 BPM | BODY MASS INDEX: 26 KG/M2 | DIASTOLIC BLOOD PRESSURE: 88 MMHG

## 2022-04-11 DIAGNOSIS — N31.8 HYPERTONICITY OF BLADDER: ICD-10-CM

## 2022-04-11 DIAGNOSIS — M85.80 OSTEOPENIA, UNSPECIFIED LOCATION: ICD-10-CM

## 2022-04-11 DIAGNOSIS — M79.7 FIBROMYALGIA: ICD-10-CM

## 2022-04-11 DIAGNOSIS — M19.90 OSTEOARTHRITIS, UNSPECIFIED OSTEOARTHRITIS TYPE, UNSPECIFIED SITE: ICD-10-CM

## 2022-04-11 DIAGNOSIS — G47.33 OBSTRUCTIVE SLEEP APNEA: ICD-10-CM

## 2022-04-11 DIAGNOSIS — Z00.00 ENCOUNTER FOR ANNUAL HEALTH EXAMINATION: Primary | ICD-10-CM

## 2022-04-11 DIAGNOSIS — Z12.31 ENCOUNTER FOR SCREENING MAMMOGRAM FOR MALIGNANT NEOPLASM OF BREAST: ICD-10-CM

## 2022-04-11 DIAGNOSIS — G47.00 INSOMNIA, UNSPECIFIED TYPE: ICD-10-CM

## 2022-04-11 DIAGNOSIS — Z23 NEED FOR PNEUMOCOCCAL VACCINE: ICD-10-CM

## 2022-04-11 DIAGNOSIS — I10 ESSENTIAL HYPERTENSION: ICD-10-CM

## 2022-04-11 DIAGNOSIS — F33.1 MODERATE EPISODE OF RECURRENT MAJOR DEPRESSIVE DISORDER (HCC): ICD-10-CM

## 2022-04-11 DIAGNOSIS — Z23 NEED FOR SHINGLES VACCINE: ICD-10-CM

## 2022-04-11 DIAGNOSIS — G56.00 CARPAL TUNNEL SYNDROME, UNSPECIFIED LATERALITY: ICD-10-CM

## 2022-04-11 DIAGNOSIS — M48.061 SPINAL STENOSIS OF LUMBAR REGION WITHOUT NEUROGENIC CLAUDICATION: ICD-10-CM

## 2022-04-11 DIAGNOSIS — M54.12 CERVICAL RADICULOPATHY: ICD-10-CM

## 2022-04-11 DIAGNOSIS — E78.2 MIXED HYPERLIPIDEMIA: ICD-10-CM

## 2022-04-11 DIAGNOSIS — E11.65 TYPE 2 DIABETES MELLITUS WITH HYPERGLYCEMIA, WITHOUT LONG-TERM CURRENT USE OF INSULIN (HCC): ICD-10-CM

## 2022-04-11 DIAGNOSIS — M47.812 CERVICAL SPONDYLOSIS: ICD-10-CM

## 2022-04-11 DIAGNOSIS — M72.2 PLANTAR FASCIITIS: ICD-10-CM

## 2022-04-11 PROCEDURE — G0009 ADMIN PNEUMOCOCCAL VACCINE: HCPCS | Performed by: FAMILY MEDICINE

## 2022-04-11 PROCEDURE — G0439 PPPS, SUBSEQ VISIT: HCPCS | Performed by: FAMILY MEDICINE

## 2022-04-11 PROCEDURE — 90732 PPSV23 VACC 2 YRS+ SUBQ/IM: CPT | Performed by: FAMILY MEDICINE

## 2022-04-11 RX ORDER — ZOSTER VACCINE RECOMBINANT, ADJUVANTED 50 MCG/0.5
0.5 KIT INTRAMUSCULAR ONCE
Qty: 1 EACH | Refills: 0 | Status: SHIPPED | OUTPATIENT
Start: 2022-04-11 | End: 2022-04-11

## 2022-04-11 RX ORDER — TELMISARTAN 40 MG/1
40 TABLET ORAL DAILY
Qty: 90 TABLET | Refills: 1 | Status: SHIPPED | OUTPATIENT
Start: 2022-04-11

## 2022-04-11 RX ORDER — ALENDRONATE SODIUM 70 MG/1
70 TABLET ORAL WEEKLY
Qty: 12 TABLET | Refills: 1 | Status: SHIPPED | OUTPATIENT
Start: 2022-04-11

## 2022-04-11 RX ORDER — TRAMADOL HYDROCHLORIDE 50 MG/1
50 TABLET ORAL 2 TIMES DAILY PRN
Qty: 60 TABLET | Refills: 0 | Status: SHIPPED | OUTPATIENT
Start: 2022-04-11

## 2022-04-11 RX ORDER — ZOLPIDEM TARTRATE 10 MG/1
10 TABLET ORAL NIGHTLY PRN
Qty: 90 TABLET | Refills: 1 | Status: SHIPPED | OUTPATIENT
Start: 2022-04-11

## 2022-04-12 ENCOUNTER — LAB ENCOUNTER (OUTPATIENT)
Dept: LAB | Age: 62
End: 2022-04-12
Attending: FAMILY MEDICINE
Payer: MEDICARE

## 2022-04-12 DIAGNOSIS — E11.65 TYPE 2 DIABETES MELLITUS WITH HYPERGLYCEMIA, WITHOUT LONG-TERM CURRENT USE OF INSULIN (HCC): ICD-10-CM

## 2022-04-12 DIAGNOSIS — I10 ESSENTIAL HYPERTENSION: ICD-10-CM

## 2022-04-12 DIAGNOSIS — E78.2 MIXED HYPERLIPIDEMIA: ICD-10-CM

## 2022-04-12 DIAGNOSIS — Z00.00 ENCOUNTER FOR ANNUAL HEALTH EXAMINATION: ICD-10-CM

## 2022-04-12 LAB
ALBUMIN SERPL-MCNC: 4.2 G/DL (ref 3.4–5)
ALBUMIN/GLOB SERPL: 1.4 {RATIO} (ref 1–2)
ALP LIVER SERPL-CCNC: 59 U/L
ALT SERPL-CCNC: 21 U/L
ANION GAP SERPL CALC-SCNC: 3 MMOL/L (ref 0–18)
AST SERPL-CCNC: 17 U/L (ref 15–37)
BASOPHILS # BLD AUTO: 0.05 X10(3) UL (ref 0–0.2)
BASOPHILS NFR BLD AUTO: 1 %
BILIRUB SERPL-MCNC: 0.6 MG/DL (ref 0.1–2)
BUN BLD-MCNC: 14 MG/DL (ref 7–18)
BUN/CREAT SERPL: 16.9 (ref 10–20)
CALCIUM BLD-MCNC: 9.7 MG/DL (ref 8.5–10.1)
CHLORIDE SERPL-SCNC: 103 MMOL/L (ref 98–112)
CHOLEST SERPL-MCNC: 188 MG/DL (ref ?–200)
CO2 SERPL-SCNC: 33 MMOL/L (ref 21–32)
CREAT BLD-MCNC: 0.83 MG/DL
DEPRECATED RDW RBC AUTO: 41.6 FL (ref 35.1–46.3)
EOSINOPHIL # BLD AUTO: 0.12 X10(3) UL (ref 0–0.7)
EOSINOPHIL NFR BLD AUTO: 2.5 %
ERYTHROCYTE [DISTWIDTH] IN BLOOD BY AUTOMATED COUNT: 12.5 % (ref 11–15)
EST. AVERAGE GLUCOSE BLD GHB EST-MCNC: 128 MG/DL (ref 68–126)
FASTING PATIENT LIPID ANSWER: YES
FASTING STATUS PATIENT QL REPORTED: YES
GLOBULIN PLAS-MCNC: 3 G/DL (ref 2.8–4.4)
GLUCOSE BLD-MCNC: 124 MG/DL (ref 70–99)
HBA1C MFR BLD: 6.1 % (ref ?–5.7)
HCT VFR BLD AUTO: 41.1 %
HDLC SERPL-MCNC: 80 MG/DL (ref 40–59)
HGB BLD-MCNC: 13.5 G/DL
IMM GRANULOCYTES # BLD AUTO: 0.02 X10(3) UL (ref 0–1)
IMM GRANULOCYTES NFR BLD: 0.4 %
LDLC SERPL CALC-MCNC: 91 MG/DL (ref ?–100)
LYMPHOCYTES # BLD AUTO: 1.49 X10(3) UL (ref 1–4)
LYMPHOCYTES NFR BLD AUTO: 31 %
MCH RBC QN AUTO: 29.9 PG (ref 26–34)
MCHC RBC AUTO-ENTMCNC: 32.8 G/DL (ref 31–37)
MCV RBC AUTO: 90.9 FL
MONOCYTES # BLD AUTO: 0.44 X10(3) UL (ref 0.1–1)
MONOCYTES NFR BLD AUTO: 9.1 %
NEUTROPHILS # BLD AUTO: 2.69 X10 (3) UL (ref 1.5–7.7)
NEUTROPHILS # BLD AUTO: 2.69 X10(3) UL (ref 1.5–7.7)
NEUTROPHILS NFR BLD AUTO: 56 %
NONHDLC SERPL-MCNC: 108 MG/DL (ref ?–130)
OSMOLALITY SERPL CALC.SUM OF ELEC: 290 MOSM/KG (ref 275–295)
PLATELET # BLD AUTO: 327 10(3)UL (ref 150–450)
POTASSIUM SERPL-SCNC: 4.5 MMOL/L (ref 3.5–5.1)
PROT SERPL-MCNC: 7.2 G/DL (ref 6.4–8.2)
RBC # BLD AUTO: 4.52 X10(6)UL
SODIUM SERPL-SCNC: 139 MMOL/L (ref 136–145)
TRIGL SERPL-MCNC: 94 MG/DL (ref 30–149)
TSI SER-ACNC: 1.43 MIU/ML (ref 0.36–3.74)
VLDLC SERPL CALC-MCNC: 15 MG/DL (ref 0–30)
WBC # BLD AUTO: 4.8 X10(3) UL (ref 4–11)

## 2022-04-12 PROCEDURE — 85025 COMPLETE CBC W/AUTO DIFF WBC: CPT

## 2022-04-12 PROCEDURE — 83036 HEMOGLOBIN GLYCOSYLATED A1C: CPT

## 2022-04-12 PROCEDURE — 36415 COLL VENOUS BLD VENIPUNCTURE: CPT

## 2022-04-12 PROCEDURE — 80061 LIPID PANEL: CPT

## 2022-04-12 PROCEDURE — 84443 ASSAY THYROID STIM HORMONE: CPT

## 2022-04-12 PROCEDURE — 80053 COMPREHEN METABOLIC PANEL: CPT

## 2022-04-18 ENCOUNTER — TELEPHONE (OUTPATIENT)
Dept: FAMILY MEDICINE CLINIC | Facility: CLINIC | Age: 62
End: 2022-04-18

## 2022-04-18 NOTE — TELEPHONE ENCOUNTER
Patient contacted and informed provider still needs to review labs. I briefly went over labs; most within normal limits. Patient was advised to allow few more days for provider to review and comment. Will f/u afterwards. Patient verbalized understanding.

## 2022-04-18 NOTE — TELEPHONE ENCOUNTER
Pt would like a call back with her blood test results. Pt is requesting a return call in [address] [facility]

## 2022-05-05 RX ORDER — LOSARTAN POTASSIUM 50 MG/1
50 TABLET ORAL 2 TIMES DAILY
Qty: 180 TABLET | Refills: 1 | OUTPATIENT
Start: 2022-05-05

## 2022-05-05 NOTE — TELEPHONE ENCOUNTER
Per EMR rx was discontinued, we'll verify with pt.        Refill passed per [facility] protocol   Requested Prescriptions   Pending Prescriptions Disp Refills    LOSARTAN 50 MG Oral Tab [Pharmacy Med Name: LOSARTAN POTASSIUM 50 MG TAB] 180 tablet 1     Sig: TAKE 1 TABLET BY MOUTH TWICE A DAY        Hypertensive Medications Protocol Passed - 5/5/2022 12:39 PM        Passed - CMP or BMP in past 12 months        Passed - Appointment in past 6 or next 3 months        Passed - GFR Non- > 50     Lab Results   Component Value Date    GFRNAA 75 04/12/2022

## 2022-05-05 NOTE — TELEPHONE ENCOUNTER
Spoke with pt via [facility]  Heather Vicente ID # [id number], pt  verified  Pt stated her losartan was discontinued and it was switched to Telmisartan 40 mg every day. Rx for losartan denied and was sent to pharm.      FYI

## 2022-05-11 RX ORDER — SIMVASTATIN 20 MG
TABLET ORAL
Qty: 90 TABLET | Refills: 1 | Status: SHIPPED | OUTPATIENT
Start: 2022-05-11

## 2022-05-11 RX ORDER — OMEPRAZOLE 40 MG/1
CAPSULE, DELAYED RELEASE ORAL
Qty: 90 CAPSULE | Refills: 1 | Status: SHIPPED | OUTPATIENT
Start: 2022-05-11 | End: 2022-08-12

## 2022-05-11 NOTE — TELEPHONE ENCOUNTER
Please review; protocol failed/No Protcol    Requested Prescriptions   Pending Prescriptions Disp Refills    TRAMADOL 50 MG Oral Tab [Pharmacy Med Name: TRAMADOL HCL 50 MG TABLET] 60 tablet 0     Sig: TAKE 1 TABLET BY MOUTH 2 TIMES DAILY AS NEEDED FOR PAIN.         There is no refill protocol information for this order       Signed Prescriptions Disp Refills    SIMVASTATIN 20 MG Oral Tab 90 tablet 1     Sig: TAKE 1 TABLET BY MOUTH EVERY DAY AT NIGHT        Cholesterol Medication Protocol Passed - 5/11/2022  3:12 PM        Passed - ALT in past 12 months        Passed - LDL in past 12 months        Passed - Last ALT < 80       Lab Results   Component Value Date    ALT 21 04/12/2022             Passed - Last LDL < 130     Lab Results   Component Value Date    LDL 91 04/12/2022               Passed - Appointment in past 12 or next 3 months              Recent Outpatient Visits              1 month ago Encounter for annual health examination    [address] MD    Office Visit    3 months ago Acute intractable tension-type headache    [facility], [facility], [address], Meagan Herman MD    Telemedicine    7 months ago Insomnia, unspecified type    [address] MD    Office Visit    1 year ago Encounter for annual health examination    [address] MD    Office Visit    1 year ago Encounter for screening mammogram for malignant neoplasm of breast    [address], Courtney Boast, MD    Virtual Phone E/M

## 2022-05-11 NOTE — TELEPHONE ENCOUNTER
Refill passed per [address] protocol. Requested Prescriptions   Pending Prescriptions Disp Refills    SIMVASTATIN 20 MG Oral Tab [Pharmacy Med Name: SIMVASTATIN 20 MG TABLET] 90 tablet 1     Sig: TAKE 1 TABLET BY MOUTH EVERY DAY AT NIGHT        Cholesterol Medication Protocol Passed - 5/11/2022  3:12 PM        Passed - ALT in past 12 months        Passed - LDL in past 12 months        Passed - Last ALT < 80       Lab Results   Component Value Date    ALT 21 04/12/2022             Passed - Last LDL < 130     Lab Results   Component Value Date    LDL 91 04/12/2022               Passed - Appointment in past 12 or next 3 months           TRAMADOL 50 MG Oral Tab [Pharmacy Med Name: TRAMADOL HCL 50 MG TABLET] 60 tablet 0     Sig: TAKE 1 TABLET BY MOUTH 2 TIMES DAILY AS NEEDED FOR PAIN.         There is no refill protocol information for this order               Recent Outpatient Visits              1 month ago Encounter for annual health examination    [address] MD    Office Visit    3 months ago Acute intractable tension-type headache    [facility], [facility], Erika Guzman MD    Telemedicine    7 months ago Insomnia, unspecified type    [address] MD    Office Visit    1 year ago Encounter for annual health examination    [address] Ace[address] MD    Office Visit    1 year ago Encounter for screening mammogram for malignant neoplasm of breast    [address] Aut[address] Acevedo MD    Virtual Phone E/M

## 2022-05-11 NOTE — TELEPHONE ENCOUNTER
Refill passed per [place] [employer], [facility] protocol.     Requested Prescriptions   Pending Prescriptions Disp Refills    OMEPRAZOLE 40 MG Oral Capsule Delayed Release [Pharmacy Med Name: OMEPRAZOLE DR 40 MG CAPSULE] 90 capsule 1     Sig: TAKE 1 CAPSULE BY MOUTH EVERY DAY        Gastrointestional Medication Protocol Passed - 5/11/2022  3:09 PM        Passed - Appointment in past 12 or next 3 months                  Recent Outpatient Visits              1 month ago Encounter for annual health examination    Derek Esquivel MD    Office Visit    3 months ago Acute intractable tension-type headache    [facility], [place] [address]Derek MD    Telemedicine    7 months ago Insomnia, unspecified type    Derek Esquivel MD    Office Visit    1 year ago Encounter for annual health examination    Derek Esquivel MD    Office Visit    1 year ago Encounter for screening mammogram for malignant neoplasm of breast    Bridger Esquivel MD    Virtual Phone E/M

## 2022-05-12 RX ORDER — TRAMADOL HYDROCHLORIDE 50 MG/1
TABLET ORAL
Qty: 60 TABLET | Refills: 0 | Status: SHIPPED | OUTPATIENT
Start: 2022-05-12

## 2022-07-05 DIAGNOSIS — M19.90 OSTEOARTHRITIS, UNSPECIFIED OSTEOARTHRITIS TYPE, UNSPECIFIED SITE: ICD-10-CM

## 2022-07-05 DIAGNOSIS — M48.061 SPINAL STENOSIS OF LUMBAR REGION WITHOUT NEUROGENIC CLAUDICATION: ICD-10-CM

## 2022-07-08 DIAGNOSIS — I10 ESSENTIAL HYPERTENSION: ICD-10-CM

## 2022-07-09 RX ORDER — TRAMADOL HYDROCHLORIDE 50 MG/1
TABLET ORAL
Qty: 60 TABLET | Refills: 0 | Status: SHIPPED | OUTPATIENT
Start: 2022-07-09

## 2022-07-09 RX ORDER — LOSARTAN POTASSIUM 50 MG/1
TABLET ORAL
Qty: 180 TABLET | Refills: 1 | OUTPATIENT
Start: 2022-07-09

## 2022-07-18 DIAGNOSIS — G47.00 INSOMNIA, UNSPECIFIED TYPE: ICD-10-CM

## 2022-07-18 NOTE — TELEPHONE ENCOUNTER
Patient is requesting refill for the following:     zolpidem 10 MG Oral Tab            Summary: Take 1 tablet (10 mg total) by mouth nightly as needed for Sleep.  AT BEDTIME, Normal, Disp-90 tablet, R-1

## 2022-07-19 RX ORDER — ZOLPIDEM TARTRATE 10 MG/1
10 TABLET ORAL NIGHTLY PRN
Qty: 90 TABLET | Refills: 1 | Status: SHIPPED | OUTPATIENT
Start: 2022-07-19

## 2022-08-11 DIAGNOSIS — G89.29 CHRONIC LOW BACK PAIN WITHOUT SCIATICA, UNSPECIFIED BACK PAIN LATERALITY: ICD-10-CM

## 2022-08-11 DIAGNOSIS — M54.50 CHRONIC LOW BACK PAIN WITHOUT SCIATICA, UNSPECIFIED BACK PAIN LATERALITY: ICD-10-CM

## 2022-08-11 DIAGNOSIS — M19.90 OSTEOARTHRITIS, UNSPECIFIED OSTEOARTHRITIS TYPE, UNSPECIFIED SITE: ICD-10-CM

## 2022-08-11 DIAGNOSIS — I10 ESSENTIAL HYPERTENSION: ICD-10-CM

## 2022-08-11 DIAGNOSIS — M48.061 SPINAL STENOSIS OF LUMBAR REGION WITHOUT NEUROGENIC CLAUDICATION: ICD-10-CM

## 2022-08-12 RX ORDER — GABAPENTIN 300 MG/1
CAPSULE ORAL
Qty: 450 CAPSULE | Refills: 1 | Status: SHIPPED | OUTPATIENT
Start: 2022-08-12

## 2022-08-12 RX ORDER — TELMISARTAN 40 MG/1
TABLET ORAL
Qty: 90 TABLET | Refills: 1 | Status: SHIPPED | OUTPATIENT
Start: 2022-08-12

## 2022-08-12 RX ORDER — OMEPRAZOLE 40 MG/1
CAPSULE, DELAYED RELEASE ORAL
Qty: 90 CAPSULE | Refills: 1 | Status: SHIPPED | OUTPATIENT
Start: 2022-08-12

## 2022-08-12 RX ORDER — TRAMADOL HYDROCHLORIDE 50 MG/1
50 TABLET ORAL 2 TIMES DAILY PRN
Qty: 60 TABLET | Refills: 0 | Status: SHIPPED | OUTPATIENT
Start: 2022-08-12

## 2022-09-27 DIAGNOSIS — M19.90 OSTEOARTHRITIS, UNSPECIFIED OSTEOARTHRITIS TYPE, UNSPECIFIED SITE: ICD-10-CM

## 2022-09-27 DIAGNOSIS — M48.061 SPINAL STENOSIS OF LUMBAR REGION WITHOUT NEUROGENIC CLAUDICATION: ICD-10-CM

## 2022-09-28 NOTE — TELEPHONE ENCOUNTER
Please review refill protocol failed/ no protocol  Requested Prescriptions   Pending Prescriptions Disp Refills    traMADol 50 MG Oral Tab 60 tablet 0     Sig: Take 1 tablet (50 mg total) by mouth 2 (two) times daily as needed for Pain.         There is no refill protocol information for this order

## 2022-09-29 RX ORDER — TRAMADOL HYDROCHLORIDE 50 MG/1
50 TABLET ORAL 2 TIMES DAILY PRN
Qty: 60 TABLET | Refills: 0 | Status: SHIPPED | OUTPATIENT
Start: 2022-09-29

## 2022-10-25 ENCOUNTER — OFFICE VISIT (OUTPATIENT)
Dept: FAMILY MEDICINE CLINIC | Facility: CLINIC | Age: 62
End: 2022-10-25
Payer: MEDICARE

## 2022-10-25 VITALS
BODY MASS INDEX: 25.2 KG/M2 | SYSTOLIC BLOOD PRESSURE: 127 MMHG | HEART RATE: 76 BPM | WEIGHT: 125 LBS | HEIGHT: 59 IN | DIASTOLIC BLOOD PRESSURE: 81 MMHG

## 2022-10-25 DIAGNOSIS — M54.9 UPPER BACK PAIN: ICD-10-CM

## 2022-10-25 DIAGNOSIS — M54.50 CHRONIC BILATERAL LOW BACK PAIN WITHOUT SCIATICA: ICD-10-CM

## 2022-10-25 DIAGNOSIS — M25.512 CHRONIC LEFT SHOULDER PAIN: Primary | ICD-10-CM

## 2022-10-25 DIAGNOSIS — Z23 INFLUENZA VACCINE NEEDED: ICD-10-CM

## 2022-10-25 DIAGNOSIS — G89.29 CHRONIC LEFT SHOULDER PAIN: Primary | ICD-10-CM

## 2022-10-25 DIAGNOSIS — Z12.31 ENCOUNTER FOR SCREENING MAMMOGRAM FOR MALIGNANT NEOPLASM OF BREAST: ICD-10-CM

## 2022-10-25 DIAGNOSIS — G89.29 CHRONIC BILATERAL LOW BACK PAIN WITHOUT SCIATICA: ICD-10-CM

## 2022-10-25 PROCEDURE — G0008 ADMIN INFLUENZA VIRUS VAC: HCPCS | Performed by: NURSE PRACTITIONER

## 2022-10-25 PROCEDURE — 99214 OFFICE O/P EST MOD 30 MIN: CPT | Performed by: NURSE PRACTITIONER

## 2022-10-25 PROCEDURE — 90686 IIV4 VACC NO PRSV 0.5 ML IM: CPT | Performed by: NURSE PRACTITIONER

## 2022-10-26 NOTE — ASSESSMENT & PLAN NOTE
Continue gabapentin and tramadol for pain  Ice 20 min 4-6 times per day  Do not use heat on injury  Do not apply ice directly on skin, make certain a thin cloth is between skin and ice pack    Referral placed for physical therapy  Patient advised to follow-up with Dr. Aura Esteves

## 2022-10-26 NOTE — ASSESSMENT & PLAN NOTE
Continue gabapentin and tramadol for pain  Ice 20 min 4-6 times per day  Do not use heat on injury  Do not apply ice directly on skin, make certain a thin cloth is between skin and ice pack    Referral placed for physical therapy  Patient advised to follow-up with Dr. Sunni Curry

## 2022-10-26 NOTE — ASSESSMENT & PLAN NOTE
Continue gabapentin and tramadol for pain  Ice 20 min 4-6 times per day  Do not use heat on injury  Do not apply ice directly on skin, make certain a thin cloth is between skin and ice pack    Referral placed for physical therapy  Patient advised to follow-up with Dr. Margarito Cho

## 2022-10-26 NOTE — ASSESSMENT & PLAN NOTE
Patient has order for mammogram from Dr. Marcie Patricio  Patient given paper copy of mammogram order and encouraged to make appointments for breast cancer screening

## 2022-10-27 ENCOUNTER — TELEPHONE (OUTPATIENT)
Dept: FAMILY MEDICINE CLINIC | Facility: CLINIC | Age: 62
End: 2022-10-27

## 2022-10-29 ENCOUNTER — HOSPITAL ENCOUNTER (OUTPATIENT)
Dept: MAMMOGRAPHY | Age: 62
Discharge: HOME | End: 2022-10-29
Attending: FAMILY MEDICINE
Payer: MEDICARE

## 2022-10-29 DIAGNOSIS — Z00.00 ENCOUNTER FOR ANNUAL HEALTH EXAMINATION: ICD-10-CM

## 2022-10-29 DIAGNOSIS — Z12.31 ENCOUNTER FOR SCREENING MAMMOGRAM FOR MALIGNANT NEOPLASM OF BREAST: ICD-10-CM

## 2022-10-29 PROCEDURE — 77063 BREAST TOMOSYNTHESIS BI: CPT | Performed by: FAMILY MEDICINE

## 2022-10-29 PROCEDURE — 77067 SCR MAMMO BI INCL CAD: CPT | Performed by: FAMILY MEDICINE

## 2022-11-01 DIAGNOSIS — M85.80 OSTEOPENIA, UNSPECIFIED LOCATION: ICD-10-CM

## 2022-11-01 DIAGNOSIS — E78.00 PURE HYPERCHOLESTEROLEMIA: ICD-10-CM

## 2022-11-01 RX ORDER — SIMVASTATIN 20 MG
TABLET ORAL
Qty: 90 TABLET | Refills: 1 | Status: SHIPPED | OUTPATIENT
Start: 2022-11-01

## 2022-11-01 RX ORDER — ALENDRONATE SODIUM 70 MG/1
70 TABLET ORAL WEEKLY
Qty: 12 TABLET | Refills: 1 | Status: SHIPPED | OUTPATIENT
Start: 2022-11-01

## 2022-11-01 NOTE — TELEPHONE ENCOUNTER
Refill passed per [facility], [facility] protocol. Requested Prescriptions   Pending Prescriptions Disp Refills    SIMVASTATIN 20 MG Oral Tab [Pharmacy Med Name: SIMVASTATIN 20 MG TABLET] 90 tablet 1     Sig: TOME CATHY TABLETA POR VIA ORAL TODOS LOS REAL AT NIGHT       Cholesterol Medication Protocol Passed - 11/1/2022  1:14 PM        Passed - ALT in past 12 months        Passed - LDL in past 12 months        Passed - Last ALT < 80     Lab Results   Component Value Date    ALT 21 04/12/2022             Passed - Last LDL < 130     Lab Results   Component Value Date    LDL 91 04/12/2022             Passed - In person appointment or virtual visit in the past 12 mos or appointment in next 3 mos     Recent Outpatient Visits              1 week ago Chronic left shoulder pain    [address] APRN    Office Visit    6 months ago Encounter for annual health examination    [address] Guill[address] Acevedo MD    Office Visit    9 months ago Acute intractable tension-type headache    [address] Acevedo MD    Telemedicine    1 year ago Insomnia, unspecified type    [address]vedo MD    Office Visit    1 year ago Encounter for annual health examination    [address] Acevedo MD    Office Visit          Future Appointments         Provider Department Appt Notes    In 3 weeks Frankie Hernandez MD [id number] [place], [facility], [address], [place] follow up medications  policy inf *ba                 ALENDRONATE 70 MG Oral Tab [Pharmacy Med Name: ALENDRONATE SODIUM 70 MG TAB] 12 tablet 1     Sig: Take 1 tablet (70 mg total) by mouth once a week.        Osteoporosis Medication Protocol Passed - 11/1/2022  1:14 PM        Passed - In person appointment or virtual visit in the past 12 mos or appointment in next 3 mos     Recent Outpatient Visits              1 week ago Chronic left shoulder pain    [address] J[address] Acevedo, APRN    Office Visit    6 months ago Encounter for annual health examination    [address] Romeo Acevedo MD    Office Visit    9 months ago Acute intractable tension-type headache    [address] Rome[address] Acevedo MD    Telemedicine    1 year ago Insomnia, unspecified type    [place] [facility], [address], Romeo Capone MD    Office Visit    1 year ago Encounter for annual health examination    [place] [facility], [place] [place], Romeo Capone MD    Office Visit          Future Appointments         Provider Department Appt Notes    In 3 weeks Ashlie Thompson MD [place] [facility], [address], [place] follow up medications  policy inf *ba                  Recent Outpatient Visits              1 week ago Chronic left shoulder pain    [address], APRN    Office Visit    6 months ago Encounter for annual health examination    [place] [facility], [place], Romeo Capone MD    Office Visit    9 months ago Acute intractable tension-type headache    [address] Rome[address] Acevedo MD    Telemedicine    1 year ago Insomnia, unspecified type    [address] Romeo Acevedo MD    Office Visit    1 year ago Encounter for annual health examination    [address] Romeo Acevedo MD    Office Visit          Future Appointments         Provider Department Appt Notes    In 3 weeks Ashlie Thompson MD [place] [facility], [address], [place] follow up medications  policy inf *ba

## 2022-11-02 DIAGNOSIS — M19.90 OSTEOARTHRITIS, UNSPECIFIED OSTEOARTHRITIS TYPE, UNSPECIFIED SITE: ICD-10-CM

## 2022-11-02 DIAGNOSIS — M48.061 SPINAL STENOSIS OF LUMBAR REGION WITHOUT NEUROGENIC CLAUDICATION: ICD-10-CM

## 2022-11-02 NOTE — TELEPHONE ENCOUNTER
Please review. Protocol failed / No Protocol. Requested Prescriptions   Pending Prescriptions Disp Refills    TRAMADOL 50 MG Oral Tab [Pharmacy Med Name: TRAMADOL HCL 50 MG TABLET] 60 tablet 0     Sig: TAKE 1 TABLET BY MOUTH 2 TIMES DAILY AS NEEDED FOR PAIN.        There is no refill protocol information for this order

## 2022-11-03 RX ORDER — TRAMADOL HYDROCHLORIDE 50 MG/1
TABLET ORAL
Qty: 60 TABLET | Refills: 0 | Status: SHIPPED | OUTPATIENT
Start: 2022-11-03

## 2022-11-26 ENCOUNTER — OFFICE VISIT (OUTPATIENT)
Dept: FAMILY MEDICINE CLINIC | Facility: CLINIC | Age: 62
End: 2022-11-26
Payer: MEDICARE

## 2022-11-26 DIAGNOSIS — Z78.0 POST-MENOPAUSAL: Primary | ICD-10-CM

## 2022-11-26 DIAGNOSIS — M54.50 LUMBAR PAIN: ICD-10-CM

## 2022-11-26 DIAGNOSIS — F41.8 DEPRESSION WITH ANXIETY: ICD-10-CM

## 2022-11-26 DIAGNOSIS — I10 ESSENTIAL HYPERTENSION: ICD-10-CM

## 2022-11-26 DIAGNOSIS — M48.061 SPINAL STENOSIS OF LUMBAR REGION WITHOUT NEUROGENIC CLAUDICATION: ICD-10-CM

## 2022-11-26 DIAGNOSIS — M19.90 OSTEOARTHRITIS, UNSPECIFIED OSTEOARTHRITIS TYPE, UNSPECIFIED SITE: ICD-10-CM

## 2022-11-26 PROCEDURE — 99214 OFFICE O/P EST MOD 30 MIN: CPT | Performed by: FAMILY MEDICINE

## 2022-11-26 RX ORDER — TRAMADOL HYDROCHLORIDE 50 MG/1
50 TABLET ORAL 2 TIMES DAILY PRN
Qty: 60 TABLET | Refills: 0 | Status: SHIPPED | OUTPATIENT
Start: 2022-11-26

## 2022-11-26 RX ORDER — ITRACONAZOLE 100 MG/1
200 CAPSULE ORAL DAILY
Qty: 180 CAPSULE | Refills: 0 | Status: CANCELLED | OUTPATIENT
Start: 2022-11-26 | End: 2023-02-24

## 2022-11-26 RX ORDER — SERTRALINE HYDROCHLORIDE 25 MG/1
25 TABLET, FILM COATED ORAL DAILY
Qty: 90 TABLET | Refills: 0 | Status: SHIPPED | OUTPATIENT
Start: 2022-11-26

## 2022-11-28 VITALS
TEMPERATURE: 97 F | WEIGHT: 126 LBS | HEIGHT: 59 IN | BODY MASS INDEX: 25.4 KG/M2 | DIASTOLIC BLOOD PRESSURE: 89 MMHG | SYSTOLIC BLOOD PRESSURE: 139 MMHG | HEART RATE: 64 BPM

## 2022-12-29 ENCOUNTER — ORDER TRANSCRIPTION (OUTPATIENT)
Dept: PHYSICAL THERAPY | Facility: HOSPITAL | Age: 62
End: 2022-12-29

## 2022-12-29 DIAGNOSIS — M75.102 ROTATOR CUFF SYNDROME OF LEFT SHOULDER: Primary | ICD-10-CM

## 2022-12-30 ENCOUNTER — TELEPHONE (OUTPATIENT)
Dept: PHYSICAL THERAPY | Facility: HOSPITAL | Age: 62
End: 2022-12-30

## 2023-01-09 ENCOUNTER — HOSPITAL ENCOUNTER (INPATIENT)
Facility: HOSPITAL | Age: 63
LOS: 6 days | Discharge: HOME HEALTH | End: 2023-01-15
Attending: HOSPITALIST | Admitting: HOSPITALIST
Payer: MEDICARE

## 2023-01-09 ENCOUNTER — TELEPHONE (OUTPATIENT)
Dept: PHYSICAL THERAPY | Facility: HOSPITAL | Age: 63
End: 2023-01-09

## 2023-01-09 ENCOUNTER — NURSE TRIAGE (OUTPATIENT)
Dept: FAMILY MEDICINE CLINIC | Facility: CLINIC | Age: 63
End: 2023-01-09

## 2023-01-09 ENCOUNTER — APPOINTMENT (OUTPATIENT)
Dept: CT IMAGING | Facility: HOSPITAL | Age: 63
End: 2023-01-09
Attending: NURSE PRACTITIONER
Payer: MEDICARE

## 2023-01-09 DIAGNOSIS — K57.80 PERFORATED DIVERTICULUM: Primary | ICD-10-CM

## 2023-01-09 LAB
ALBUMIN SERPL-MCNC: 3.6 G/DL (ref 3.4–5)
ALBUMIN/GLOB SERPL: 0.9 {RATIO} (ref 1–2)
ALP LIVER SERPL-CCNC: 55 U/L
ALT SERPL-CCNC: 15 U/L
ANION GAP SERPL CALC-SCNC: 10 MMOL/L (ref 0–18)
AST SERPL-CCNC: 11 U/L (ref 15–37)
BASOPHILS # BLD AUTO: 0.03 X10(3) UL (ref 0–0.2)
BASOPHILS NFR BLD AUTO: 0.3 %
BILIRUB SERPL-MCNC: 0.6 MG/DL (ref 0.1–2)
BILIRUB UR QL: NEGATIVE
BUN BLD-MCNC: 8 MG/DL (ref 7–18)
BUN/CREAT SERPL: 11.3 (ref 10–20)
CALCIUM BLD-MCNC: 9.4 MG/DL (ref 8.5–10.1)
CHLORIDE SERPL-SCNC: 95 MMOL/L (ref 98–112)
CLARITY UR: CLEAR
CO2 SERPL-SCNC: 25 MMOL/L (ref 21–32)
COLOR UR: YELLOW
CREAT BLD-MCNC: 0.71 MG/DL
DEPRECATED RDW RBC AUTO: 37.5 FL (ref 35.1–46.3)
EOSINOPHIL # BLD AUTO: 0.02 X10(3) UL (ref 0–0.7)
EOSINOPHIL NFR BLD AUTO: 0.2 %
ERYTHROCYTE [DISTWIDTH] IN BLOOD BY AUTOMATED COUNT: 11.9 % (ref 11–15)
GFR SERPLBLD BASED ON 1.73 SQ M-ARVRAT: 95 ML/MIN/1.73M2 (ref 60–?)
GLOBULIN PLAS-MCNC: 4.1 G/DL (ref 2.8–4.4)
GLUCOSE BLD-MCNC: 122 MG/DL (ref 70–99)
GLUCOSE UR-MCNC: NEGATIVE MG/DL
HCT VFR BLD AUTO: 34 %
HGB BLD-MCNC: 12.1 G/DL
IMM GRANULOCYTES # BLD AUTO: 0.06 X10(3) UL (ref 0–1)
IMM GRANULOCYTES NFR BLD: 0.7 %
KETONES UR-MCNC: 15 MG/DL
LEUKOCYTE ESTERASE UR QL STRIP.AUTO: NEGATIVE
LIPASE SERPL-CCNC: 175 U/L (ref 73–393)
LYMPHOCYTES # BLD AUTO: 1.05 X10(3) UL (ref 1–4)
LYMPHOCYTES NFR BLD AUTO: 11.7 %
MCH RBC QN AUTO: 30.4 PG (ref 26–34)
MCHC RBC AUTO-ENTMCNC: 35.6 G/DL (ref 31–37)
MCV RBC AUTO: 85.4 FL
MONOCYTES # BLD AUTO: 0.86 X10(3) UL (ref 0.1–1)
MONOCYTES NFR BLD AUTO: 9.6 %
NEUTROPHILS # BLD AUTO: 6.95 X10 (3) UL (ref 1.5–7.7)
NEUTROPHILS # BLD AUTO: 6.95 X10(3) UL (ref 1.5–7.7)
NEUTROPHILS NFR BLD AUTO: 77.5 %
NITRITE UR QL STRIP.AUTO: NEGATIVE
OSMOLALITY SERPL CALC.SUM OF ELEC: 270 MOSM/KG (ref 275–295)
PH UR: 7 [PH] (ref 5–8)
PLATELET # BLD AUTO: 383 10(3)UL (ref 150–450)
POTASSIUM SERPL-SCNC: 3.6 MMOL/L (ref 3.5–5.1)
PROT SERPL-MCNC: 7.7 G/DL (ref 6.4–8.2)
PROT UR-MCNC: NEGATIVE MG/DL
RBC # BLD AUTO: 3.98 X10(6)UL
SARS-COV-2 RNA RESP QL NAA+PROBE: NOT DETECTED
SODIUM SERPL-SCNC: 130 MMOL/L (ref 136–145)
SP GR UR STRIP: <=1.005 (ref 1–1.03)
UROBILINOGEN UR STRIP-ACNC: 0.2
WBC # BLD AUTO: 9 X10(3) UL (ref 4–11)

## 2023-01-09 PROCEDURE — 99223 1ST HOSP IP/OBS HIGH 75: CPT | Performed by: HOSPITALIST

## 2023-01-09 PROCEDURE — 74177 CT ABD & PELVIS W/CONTRAST: CPT | Performed by: NURSE PRACTITIONER

## 2023-01-09 RX ORDER — MORPHINE SULFATE 2 MG/ML
1 INJECTION, SOLUTION INTRAMUSCULAR; INTRAVENOUS EVERY 2 HOUR PRN
Status: DISCONTINUED | OUTPATIENT
Start: 2023-01-09 | End: 2023-01-15

## 2023-01-09 RX ORDER — MORPHINE SULFATE 2 MG/ML
2 INJECTION, SOLUTION INTRAMUSCULAR; INTRAVENOUS ONCE
Status: COMPLETED | OUTPATIENT
Start: 2023-01-09 | End: 2023-01-09

## 2023-01-09 RX ORDER — MORPHINE SULFATE 4 MG/ML
4 INJECTION, SOLUTION INTRAMUSCULAR; INTRAVENOUS EVERY 2 HOUR PRN
Status: DISCONTINUED | OUTPATIENT
Start: 2023-01-09 | End: 2023-01-15

## 2023-01-09 RX ORDER — MORPHINE SULFATE 4 MG/ML
4 INJECTION, SOLUTION INTRAMUSCULAR; INTRAVENOUS ONCE
Status: COMPLETED | OUTPATIENT
Start: 2023-01-09 | End: 2023-01-09

## 2023-01-09 RX ORDER — ONDANSETRON 2 MG/ML
4 INJECTION INTRAMUSCULAR; INTRAVENOUS ONCE
Status: COMPLETED | OUTPATIENT
Start: 2023-01-09 | End: 2023-01-09

## 2023-01-09 RX ORDER — ACETAMINOPHEN 500 MG
500 TABLET ORAL EVERY 4 HOURS PRN
Status: DISCONTINUED | OUTPATIENT
Start: 2023-01-09 | End: 2023-01-15

## 2023-01-09 RX ORDER — MORPHINE SULFATE 2 MG/ML
2 INJECTION, SOLUTION INTRAMUSCULAR; INTRAVENOUS EVERY 2 HOUR PRN
Status: DISCONTINUED | OUTPATIENT
Start: 2023-01-09 | End: 2023-01-15

## 2023-01-09 RX ORDER — TEMAZEPAM 15 MG/1
15 CAPSULE ORAL NIGHTLY PRN
Status: DISCONTINUED | OUTPATIENT
Start: 2023-01-09 | End: 2023-01-15

## 2023-01-09 RX ORDER — HYDROCODONE BITARTRATE AND ACETAMINOPHEN 5; 325 MG/1; MG/1
2 TABLET ORAL EVERY 4 HOURS PRN
Status: DISCONTINUED | OUTPATIENT
Start: 2023-01-09 | End: 2023-01-15

## 2023-01-09 RX ORDER — HYDROCODONE BITARTRATE AND ACETAMINOPHEN 5; 325 MG/1; MG/1
1 TABLET ORAL EVERY 4 HOURS PRN
Status: DISCONTINUED | OUTPATIENT
Start: 2023-01-09 | End: 2023-01-15

## 2023-01-09 RX ORDER — SODIUM CHLORIDE 9 MG/ML
INJECTION, SOLUTION INTRAVENOUS CONTINUOUS
Status: DISCONTINUED | OUTPATIENT
Start: 2023-01-09 | End: 2023-01-12

## 2023-01-09 RX ORDER — PROCHLORPERAZINE EDISYLATE 5 MG/ML
5 INJECTION INTRAMUSCULAR; INTRAVENOUS EVERY 8 HOURS PRN
Status: DISCONTINUED | OUTPATIENT
Start: 2023-01-09 | End: 2023-01-15

## 2023-01-09 RX ORDER — ACETAMINOPHEN 325 MG/1
650 TABLET ORAL EVERY 4 HOURS PRN
Status: DISCONTINUED | OUTPATIENT
Start: 2023-01-09 | End: 2023-01-15

## 2023-01-09 RX ORDER — ONDANSETRON 2 MG/ML
4 INJECTION INTRAMUSCULAR; INTRAVENOUS EVERY 6 HOURS PRN
Status: DISCONTINUED | OUTPATIENT
Start: 2023-01-09 | End: 2023-01-15

## 2023-01-09 NOTE — ED QUICK NOTES
Orders for admission. Patient and/or next of kin aware of plan and is ready to go upstairs. Please call ED RN Vaishali Claire at listed extension should you have any further questions or concerns. Thank you. Nurse and Ext: [address] [facility], [phone]    Chief Presentation: ABDOMINAL PAIN    Admission Diagnosis: PERFORATED DIVERTICULUM    Admitting/Attending Physician: Val Naylor (GENERAL SURGERY); MICHAEL (IR)    Neuro: A&OX3    Cardiac: SR    Resp: ROOM AIR    GI: GENERALIZED ABDOMINAL PAIN    : WNL    Skin/IV: R HAND (20) PATENT AND INFUSING 30-MINUTE ZOSYN INFUSION.     Other Pertinent Information: N/A    Type of COVID Test Sent: RAPID    COVID Level of Suspicion: LOW    PRIMARY CARE PARTNER:

## 2023-01-10 ENCOUNTER — APPOINTMENT (OUTPATIENT)
Dept: CT IMAGING | Facility: HOSPITAL | Age: 63
End: 2023-01-10
Attending: CLINICAL NURSE SPECIALIST
Payer: MEDICARE

## 2023-01-10 LAB
ANION GAP SERPL CALC-SCNC: 7 MMOL/L (ref 0–18)
BASOPHILS # BLD AUTO: 0.03 X10(3) UL (ref 0–0.2)
BASOPHILS NFR BLD AUTO: 0.5 %
BUN BLD-MCNC: 7 MG/DL (ref 7–18)
BUN/CREAT SERPL: 11.1 (ref 10–20)
C DIFF TOX B STL QL: NEGATIVE
CALCIUM BLD-MCNC: 8 MG/DL (ref 8.5–10.1)
CHLORIDE SERPL-SCNC: 104 MMOL/L (ref 98–112)
CO2 SERPL-SCNC: 27 MMOL/L (ref 21–32)
CREAT BLD-MCNC: 0.63 MG/DL
DEPRECATED RDW RBC AUTO: 39.9 FL (ref 35.1–46.3)
EOSINOPHIL # BLD AUTO: 0.03 X10(3) UL (ref 0–0.7)
EOSINOPHIL NFR BLD AUTO: 0.5 %
ERYTHROCYTE [DISTWIDTH] IN BLOOD BY AUTOMATED COUNT: 12.4 % (ref 11–15)
GFR SERPLBLD BASED ON 1.73 SQ M-ARVRAT: 100 ML/MIN/1.73M2 (ref 60–?)
GLUCOSE BLD-MCNC: 100 MG/DL (ref 70–99)
HCT VFR BLD AUTO: 30.2 %
HGB BLD-MCNC: 10.2 G/DL
IMM GRANULOCYTES # BLD AUTO: 0.04 X10(3) UL (ref 0–1)
IMM GRANULOCYTES NFR BLD: 0.6 %
LYMPHOCYTES # BLD AUTO: 0.95 X10(3) UL (ref 1–4)
LYMPHOCYTES NFR BLD AUTO: 14.8 %
MCH RBC QN AUTO: 29.6 PG (ref 26–34)
MCHC RBC AUTO-ENTMCNC: 33.8 G/DL (ref 31–37)
MCV RBC AUTO: 87.5 FL
MONOCYTES # BLD AUTO: 0.72 X10(3) UL (ref 0.1–1)
MONOCYTES NFR BLD AUTO: 11.3 %
NEUTROPHILS # BLD AUTO: 4.63 X10 (3) UL (ref 1.5–7.7)
NEUTROPHILS # BLD AUTO: 4.63 X10(3) UL (ref 1.5–7.7)
NEUTROPHILS NFR BLD AUTO: 72.3 %
OSMOLALITY SERPL CALC.SUM OF ELEC: 284 MOSM/KG (ref 275–295)
PLATELET # BLD AUTO: 312 10(3)UL (ref 150–450)
POTASSIUM SERPL-SCNC: 3.3 MMOL/L (ref 3.5–5.1)
RBC # BLD AUTO: 3.45 X10(6)UL
SODIUM SERPL-SCNC: 138 MMOL/L (ref 136–145)
WBC # BLD AUTO: 6.4 X10(3) UL (ref 4–11)

## 2023-01-10 PROCEDURE — 49406 IMAGE CATH FLUID PERI/RETRO: CPT | Performed by: CLINICAL NURSE SPECIALIST

## 2023-01-10 PROCEDURE — 99233 SBSQ HOSP IP/OBS HIGH 50: CPT | Performed by: HOSPITALIST

## 2023-01-10 PROCEDURE — 99152 MOD SED SAME PHYS/QHP 5/>YRS: CPT | Performed by: CLINICAL NURSE SPECIALIST

## 2023-01-10 PROCEDURE — 0D9N30Z DRAINAGE OF SIGMOID COLON WITH DRAINAGE DEVICE, PERCUTANEOUS APPROACH: ICD-10-PCS | Performed by: RADIOLOGY

## 2023-01-10 PROCEDURE — 99153 MOD SED SAME PHYS/QHP EA: CPT | Performed by: CLINICAL NURSE SPECIALIST

## 2023-01-10 PROCEDURE — 99223 1ST HOSP IP/OBS HIGH 75: CPT | Performed by: SURGERY

## 2023-01-10 RX ORDER — ZOLPIDEM TARTRATE 5 MG/1
10 TABLET ORAL NIGHTLY PRN
Status: DISCONTINUED | OUTPATIENT
Start: 2023-01-10 | End: 2023-01-15

## 2023-01-10 RX ORDER — MIDAZOLAM HYDROCHLORIDE 1 MG/ML
INJECTION INTRAMUSCULAR; INTRAVENOUS
Status: COMPLETED
Start: 2023-01-10 | End: 2023-01-10

## 2023-01-10 RX ORDER — FLUMAZENIL 0.1 MG/ML
0.2 INJECTION INTRAVENOUS AS NEEDED
Status: DISCONTINUED | OUTPATIENT
Start: 2023-01-10 | End: 2023-01-10

## 2023-01-10 RX ORDER — LOSARTAN POTASSIUM 50 MG/1
50 TABLET ORAL DAILY
Status: DISCONTINUED | OUTPATIENT
Start: 2023-01-11 | End: 2023-01-15

## 2023-01-10 RX ORDER — GABAPENTIN 300 MG/1
900 CAPSULE ORAL NIGHTLY
Status: DISCONTINUED | OUTPATIENT
Start: 2023-01-10 | End: 2023-01-15

## 2023-01-10 RX ORDER — HEPARIN SODIUM 5000 [USP'U]/ML
5000 INJECTION, SOLUTION INTRAVENOUS; SUBCUTANEOUS EVERY 12 HOURS
Status: DISCONTINUED | OUTPATIENT
Start: 2023-01-11 | End: 2023-01-15

## 2023-01-10 RX ORDER — PANTOPRAZOLE SODIUM 40 MG/1
40 TABLET, DELAYED RELEASE ORAL
Status: DISCONTINUED | OUTPATIENT
Start: 2023-01-11 | End: 2023-01-15

## 2023-01-10 RX ORDER — GABAPENTIN 300 MG/1
600 CAPSULE ORAL DAILY
Status: DISCONTINUED | OUTPATIENT
Start: 2023-01-11 | End: 2023-01-15

## 2023-01-10 RX ORDER — SODIUM CHLORIDE 9 MG/ML
INJECTION, SOLUTION INTRAVENOUS CONTINUOUS
Status: DISCONTINUED | OUTPATIENT
Start: 2023-01-10 | End: 2023-01-10

## 2023-01-10 RX ORDER — NALOXONE HYDROCHLORIDE 0.4 MG/ML
80 INJECTION, SOLUTION INTRAMUSCULAR; INTRAVENOUS; SUBCUTANEOUS AS NEEDED
Status: DISCONTINUED | OUTPATIENT
Start: 2023-01-10 | End: 2023-01-10

## 2023-01-10 RX ORDER — MIDAZOLAM HYDROCHLORIDE 1 MG/ML
1 INJECTION INTRAMUSCULAR; INTRAVENOUS EVERY 5 MIN PRN
Status: DISCONTINUED | OUTPATIENT
Start: 2023-01-10 | End: 2023-01-10

## 2023-01-10 RX ORDER — SERTRALINE HYDROCHLORIDE 25 MG/1
25 TABLET, FILM COATED ORAL DAILY
Status: DISCONTINUED | OUTPATIENT
Start: 2023-01-10 | End: 2023-01-15

## 2023-01-10 NOTE — H&P (VIEW-ONLY)
[facility]    PATIENT'S NAME: ANSELMO Poe   ATTENDING PHYSICIAN: Vinny Luna MD   PATIENT ACCOUNT#:   [id number]    LOCATION:  [address]  MEDICAL RECORD #:   [id number]       YOB: 1960  ADMISSION DATE:       01/09/2023    HISTORY AND PHYSICAL EXAMINATION    CHIEF COMPLAINT:  Perforated sigmoid diverticulitis. HISTORY OF PRESENT ILLNESS:  Patient is a 58-year-old  female who came into the emergency department for evaluation of 3 to 4 days of lower abdominal pain associated with malaise and fatigue. CBC and chemistry unremarkable. CT scan of the abdomen showed perforated sigmoid diverticulitis with contained perforation with abscess formation adjacent to diverticulitis, gas and fluid collection, debris in a cavity in the left lower quadrant area measures around 4 cm in diameter. Patient was started on IV Zosyn, and she will be admitted to the hospital for further management. PAST MEDICAL HISTORY:  Hypertension, hyperlipidemia, diverticulosis found on her last colonoscopy in 2015, gastroesophageal reflux disease, obstructive sleep apnea, depression, generalized osteoarthritis. PAST SURGICAL HISTORY:  Bilateral carpal tunnel release, bilateral shoulder arthroscopic rotator cuff repair, vaginal hysterectomy. MEDICATIONS:  Please see medication reconciliation list.      ALLERGIES:  Seafood. No known drug allergies. FAMILY HISTORY:  Positive for diabetes mellitus type 2. SOCIAL HISTORY:  No tobacco, alcohol, or drug use. Lives with her family. Independent for basic activities of daily living. REVIEW OF SYSTEMS:  Patient reports lower abdominal pain associated with constipation, malaise and fatigue for last 3 to 4 days. No nausea or vomiting. Other 12-point review of systems is negative. PHYSICAL EXAMINATION:    GENERAL:  Alert and oriented to time, place, and person. Moderate distress.   VITAL SIGNS:  Temperature 98.2, pulse 72, respiratory rate 18, blood pressure 137/78, pulse ox 98%. HEENT:  Atraumatic. Oropharynx clear. Moist mucous membranes. Ears, nose normal.  Eyes:  Anicteric sclerae. NECK:  Supple. No lymphadenopathy. Trachea midline. Full range of motion. LUNGS:  Clear to auscultation bilaterally. Normal respiratory effort. HEART:  Regular rate, rhythm. S1 and S2 auscultated. No murmur. ABDOMEN:  Soft, nondistended. Tenderness noted left lower quadrant area with no guarding. Mild rebound tenderness. Hypoactive bowel sounds. EXTREMITIES:  No peripheral edema, clubbing, or cyanosis. NEUROLOGIC:  Motor and sensory intact. Cranial nerves II through XII are intact. ASSESSMENT AND PLAN:  Perforated sigmoid diverticulitis with left lower quadrant abdominal abscess. Patient will be admitted to general medical floor. Start her on IV fluids and IV Zosyn. Clear liquid diet, n.p.o. after midnight. Interventional Radiology for drainage catheter replacement. Also obtain general surgery consult. Monitor her hemodynamic status. Monitor temperature curve. Pain control. Further recommendations to follow.     Dictated By Justice Gonzalez MD  d: 01/09/2023 16:43:45  t: 01/09/2023 18:29:36  Job [id number]  /

## 2023-01-10 NOTE — H&P
[facility]    PATIENT'S NAME: ANSELMO Poe   ATTENDING PHYSICIAN: Mert Parra MD   PATIENT ACCOUNT#:   [id number]    LOCATION:  [address]  MEDICAL RECORD #:   [id number]       YOB: 1960  ADMISSION DATE:       01/09/2023    HISTORY AND PHYSICAL EXAMINATION    CHIEF COMPLAINT:  Perforated sigmoid diverticulitis. HISTORY OF PRESENT ILLNESS:  Patient is a 69-year-old  female who came into the emergency department for evaluation of 3 to 4 days of lower abdominal pain associated with malaise and fatigue. CBC and chemistry unremarkable. CT scan of the abdomen showed perforated sigmoid diverticulitis with contained perforation with abscess formation adjacent to diverticulitis, gas and fluid collection, debris in a cavity in the left lower quadrant area measures around 4 cm in diameter. Patient was started on IV Zosyn, and she will be admitted to the hospital for further management. PAST MEDICAL HISTORY:  Hypertension, hyperlipidemia, diverticulosis found on her last colonoscopy in 2015, gastroesophageal reflux disease, obstructive sleep apnea, depression, generalized osteoarthritis. PAST SURGICAL HISTORY:  Bilateral carpal tunnel release, bilateral shoulder arthroscopic rotator cuff repair, vaginal hysterectomy. MEDICATIONS:  Please see medication reconciliation list.      ALLERGIES:  Seafood. No known drug allergies. FAMILY HISTORY:  Positive for diabetes mellitus type 2. SOCIAL HISTORY:  No tobacco, alcohol, or drug use. Lives with her family. Independent for basic activities of daily living. REVIEW OF SYSTEMS:  Patient reports lower abdominal pain associated with constipation, malaise and fatigue for last 3 to 4 days. No nausea or vomiting. Other 12-point review of systems is negative. PHYSICAL EXAMINATION:    GENERAL:  Alert and oriented to time, place, and person. Moderate distress.   VITAL SIGNS:  Temperature 98.2, pulse 72, respiratory rate 18, blood pressure 137/78, pulse ox 98%. HEENT:  Atraumatic. Oropharynx clear. Moist mucous membranes. Ears, nose normal.  Eyes:  Anicteric sclerae. NECK:  Supple. No lymphadenopathy. Trachea midline. Full range of motion. LUNGS:  Clear to auscultation bilaterally. Normal respiratory effort. HEART:  Regular rate, rhythm. S1 and S2 auscultated. No murmur. ABDOMEN:  Soft, nondistended. Tenderness noted left lower quadrant area with no guarding. Mild rebound tenderness. Hypoactive bowel sounds. EXTREMITIES:  No peripheral edema, clubbing, or cyanosis. NEUROLOGIC:  Motor and sensory intact. Cranial nerves II through XII are intact. ASSESSMENT AND PLAN:  Perforated sigmoid diverticulitis with left lower quadrant abdominal abscess. Patient will be admitted to general medical floor. Start her on IV fluids and IV Zosyn. Clear liquid diet, n.p.o. after midnight. Interventional Radiology for drainage catheter replacement. Also obtain general surgery consult. Monitor her hemodynamic status. Monitor temperature curve. Pain control. Further recommendations to follow.     Dictated By Christina Gooden MD  d: 01/09/2023 16:43:45  t: 01/09/2023 18:29:36  Job [id number]  SC/

## 2023-01-10 NOTE — BRIEF PROCEDURE NOTE
[facility] HOSP - [facility]  Procedure Note    [address] Patient Status:  Inpatient    1960 MRN [id number]   Location [address] [facility] Attending Ryan Gay MD   Hosp Day # 1 PCP Carol Davis MD     Procedure: percutaneous diverticular abscess drainage    Pre-Procedure Diagnosis:  Diverticular abscess    Post-Procedure Diagnosis: same    Anesthesia:  Sedation    Findings: 10 Israeli drain placed within left lower quadrant abscess, 5 cc purulent appearing fluid aspirated    Specimens: Fluid for C&S    Blood Loss:  0     Complications:  None    Drains: 10 Blank Newell MD  1/10/2023

## 2023-01-10 NOTE — PLAN OF CARE
Problem: Patient Centered Care  Goal: Patient preferences are identified and integrated in the patient's plan of care  Description: Interventions:  - What would you like us to know as we care for you?  From home with spouse  - Provide timely, complete, and accurate information to patient/family  - Incorporate patient and family knowledge, values, beliefs, and cultural backgrounds into the planning and delivery of care  - Encourage patient/family to participate in care and decision-making at the level they choose  - Honor patient and family perspectives and choices  Outcome: Progressing     Problem: GASTROINTESTINAL - ADULT  Goal: Minimal or absence of nausea and vomiting  Description: INTERVENTIONS:  - Maintain adequate hydration with IV or PO as ordered and tolerated  - Nasogastric tube to low intermittent suction as ordered  - Evaluate effectiveness of ordered antiemetic medications  - Provide nonpharmacologic comfort measures as appropriate  - Advance diet as tolerated, if ordered  - Obtain nutritional consult as needed  - Evaluate fluid balance  Outcome: Progressing  Goal: Maintains or returns to baseline bowel function  Description: INTERVENTIONS:  - Assess bowel function  - Maintain adequate hydration with IV or PO as ordered and tolerated  - Evaluate effectiveness of GI medications  - Encourage mobilization and activity  - Obtain nutritional consult as needed  - Establish a toileting routine/schedule  - Consider collaborating with pharmacy to review patient's medication profile  Outcome: Progressing  Goal: Maintains adequate nutritional intake (undernourished)  Description: INTERVENTIONS:  - Monitor percentage of each meal consumed  - Identify factors contributing to decreased intake, treat as appropriate  - Assist with meals as needed  - Monitor I&O, WT and lab values  - Obtain nutritional consult as needed  - Optimize oral hygiene and moisture  - Encourage food from home; allow for food preferences  - Enhance eating environment  Outcome: Progressing  Goal: Achieves appropriate nutritional intake (bariatric)  Description: INTERVENTIONS:  - Monitor for over-consumption  - Identify factors contributing to increased intake, treat as appropriate  - Monitor I&O, WT and lab values  - Obtain nutritional consult as needed  - Evaluate psychosocial factors contributing to over-consumption  Outcome: Progressing     Problem: SKIN/TISSUE INTEGRITY - ADULT  Goal: Skin integrity remains intact  Description: INTERVENTIONS  - Assess and document risk factors for pressure ulcer development  - Assess and document skin integrity  - Monitor for areas of redness and/or skin breakdown  - Initiate interventions, skin care algorithm/standards of care as needed  Outcome: Progressing     Problem: PAIN - ADULT  Goal: Verbalizes/displays adequate comfort level or patient's stated pain goal  Description: INTERVENTIONS:  - Encourage pt to monitor pain and request assistance  - Assess pain using appropriate pain scale  - Administer analgesics based on type and severity of pain and evaluate response  - Implement non-pharmacological measures as appropriate and evaluate response  - Consider cultural and social influences on pain and pain management  - Manage/alleviate anxiety  - Utilize distraction and/or relaxation techniques  - Monitor for opioid side effects  - Notify MD/LIP if interventions unsuccessful or patient reports new pain  - Anticipate increased pain with activity and pre-medicate as appropriate  Outcome: Progressing   Patient received a/ox4 from ED, in no acute distress, verbalizes abdominal pain is adequately controlled, denies nausea. Patient to be NPO after midnight for IR drain placement, verbalizes understanding.  IV fluids infusing, patient oriented to environment, call light in reach, precautions maintained

## 2023-01-10 NOTE — PLAN OF CARE
Problem: Patient Centered Care  Goal: Patient preferences are identified and integrated in the patient's plan of care  Description: Interventions:  - What would you like us to know as we care for you?  From home with family, German speaking  - Provide timely, complete, and accurate information to patient/family  - Incorporate patient and family knowledge, values, beliefs, and cultural backgrounds into the planning and delivery of care  - Encourage patient/family to participate in care and decision-making at the level they choose  - Honor patient and family perspectives and choices  Outcome: Progressing      Problem: GASTROINTESTINAL - ADULT  Goal: Minimal or absence of nausea and vomiting  Description: INTERVENTIONS:  - Maintain adequate hydration with IV or PO as ordered and tolerated  - Nasogastric tube to low intermittent suction as ordered  - Evaluate effectiveness of ordered antiemetic medications  - Provide nonpharmacologic comfort measures as appropriate  - Advance diet as tolerated, if ordered  - Obtain nutritional consult as needed  - Evaluate fluid balance  Outcome: Progressing  Goal: Maintains or returns to baseline bowel function  Description: INTERVENTIONS:  - Assess bowel function  - Maintain adequate hydration with IV or PO as ordered and tolerated  - Evaluate effectiveness of GI medications  - Encourage mobilization and activity  - Obtain nutritional consult as needed  - Establish a toileting routine/schedule  - Consider collaborating with pharmacy to review patient's medication profile  Outcome: Progressing  Goal: Maintains adequate nutritional intake (undernourished)  Description: INTERVENTIONS:  - Monitor percentage of each meal consumed  - Identify factors contributing to decreased intake, treat as appropriate  - Assist with meals as needed  - Monitor I&O, WT and lab values  - Obtain nutritional consult as needed  - Optimize oral hygiene and moisture  - Encourage food from home; allow for food preferences  - Enhance eating environment  Outcome: Progressing  Goal: Achieves appropriate nutritional intake (bariatric)  Description: INTERVENTIONS:  - Monitor for over-consumption  - Identify factors contributing to increased intake, treat as appropriate  - Monitor I&O, WT and lab values  - Obtain nutritional consult as needed  - Evaluate psychosocial factors contributing to over-consumption  Outcome: Progressing     Problem: SKIN/TISSUE INTEGRITY - ADULT  Goal: Skin integrity remains intact  Description: INTERVENTIONS  - Assess and document risk factors for pressure ulcer development  - Assess and document skin integrity  - Monitor for areas of redness and/or skin breakdown  - Initiate interventions, skin care algorithm/standards of care as needed  Outcome: Progressing     Problem: PAIN - ADULT  Goal: Verbalizes/displays adequate comfort level or patient's stated pain goal  Description: INTERVENTIONS:  - Encourage pt to monitor pain and request assistance  - Assess pain using appropriate pain scale  - Administer analgesics based on type and severity of pain and evaluate response  - Implement non-pharmacological measures as appropriate and evaluate response  - Consider cultural and social influences on pain and pain management  - Manage/alleviate anxiety  - Utilize distraction and/or relaxation techniques  - Monitor for opioid side effects  - Notify MD/LIP if interventions unsuccessful or patient reports new pain  - Anticipate increased pain with activity and pre-medicate as appropriate  Outcome: Progressing     Vital signs are stable. C/O abdominal pain, PRN morphine given. BRUCE drain placed to LLQ by IR today, minimal output. On clear liquid diet, pt tolerated well. IVF infusing. Safety precautions in place.

## 2023-01-11 ENCOUNTER — APPOINTMENT (OUTPATIENT)
Dept: PHYSICAL THERAPY | Age: 63
End: 2023-01-11
Attending: ORTHOPAEDIC SURGERY

## 2023-01-11 DIAGNOSIS — Z87.19 HISTORY OF DIVERTICULAR ABSCESS: Primary | ICD-10-CM

## 2023-01-11 LAB
ANION GAP SERPL CALC-SCNC: 7 MMOL/L (ref 0–18)
BASOPHILS # BLD AUTO: 0.03 X10(3) UL (ref 0–0.2)
BASOPHILS NFR BLD AUTO: 0.6 %
BUN BLD-MCNC: 3 MG/DL (ref 7–18)
BUN/CREAT SERPL: 7 (ref 10–20)
CALCIUM BLD-MCNC: 8.1 MG/DL (ref 8.5–10.1)
CHLORIDE SERPL-SCNC: 105 MMOL/L (ref 98–112)
CO2 SERPL-SCNC: 26 MMOL/L (ref 21–32)
CREAT BLD-MCNC: 0.43 MG/DL
DEPRECATED RDW RBC AUTO: 40.8 FL (ref 35.1–46.3)
EOSINOPHIL # BLD AUTO: 0.1 X10(3) UL (ref 0–0.7)
EOSINOPHIL NFR BLD AUTO: 1.9 %
ERYTHROCYTE [DISTWIDTH] IN BLOOD BY AUTOMATED COUNT: 12.4 % (ref 11–15)
GFR SERPLBLD BASED ON 1.73 SQ M-ARVRAT: 109 ML/MIN/1.73M2 (ref 60–?)
GLUCOSE BLD-MCNC: 85 MG/DL (ref 70–99)
HCT VFR BLD AUTO: 30 %
HGB BLD-MCNC: 9.9 G/DL
IMM GRANULOCYTES # BLD AUTO: 0.06 X10(3) UL (ref 0–1)
IMM GRANULOCYTES NFR BLD: 1.1 %
LYMPHOCYTES # BLD AUTO: 1.15 X10(3) UL (ref 1–4)
LYMPHOCYTES NFR BLD AUTO: 21.7 %
MCH RBC QN AUTO: 29.3 PG (ref 26–34)
MCHC RBC AUTO-ENTMCNC: 33 G/DL (ref 31–37)
MCV RBC AUTO: 88.8 FL
MONOCYTES # BLD AUTO: 0.66 X10(3) UL (ref 0.1–1)
MONOCYTES NFR BLD AUTO: 12.5 %
NEUTROPHILS # BLD AUTO: 3.3 X10 (3) UL (ref 1.5–7.7)
NEUTROPHILS # BLD AUTO: 3.3 X10(3) UL (ref 1.5–7.7)
NEUTROPHILS NFR BLD AUTO: 62.2 %
OSMOLALITY SERPL CALC.SUM OF ELEC: 282 MOSM/KG (ref 275–295)
PLATELET # BLD AUTO: 351 10(3)UL (ref 150–450)
POTASSIUM SERPL-SCNC: 3.4 MMOL/L (ref 3.5–5.1)
POTASSIUM SERPL-SCNC: 3.4 MMOL/L (ref 3.5–5.1)
RBC # BLD AUTO: 3.38 X10(6)UL
SODIUM SERPL-SCNC: 138 MMOL/L (ref 136–145)
WBC # BLD AUTO: 5.3 X10(3) UL (ref 4–11)

## 2023-01-11 PROCEDURE — 99233 SBSQ HOSP IP/OBS HIGH 50: CPT | Performed by: HOSPITALIST

## 2023-01-11 PROCEDURE — 99232 SBSQ HOSP IP/OBS MODERATE 35: CPT | Performed by: SURGERY

## 2023-01-11 RX ORDER — POTASSIUM CHLORIDE 20 MEQ/1
40 TABLET, EXTENDED RELEASE ORAL ONCE
Status: COMPLETED | OUTPATIENT
Start: 2023-01-11 | End: 2023-01-11

## 2023-01-11 RX ORDER — ATORVASTATIN CALCIUM 10 MG/1
10 TABLET, FILM COATED ORAL NIGHTLY
Status: DISCONTINUED | OUTPATIENT
Start: 2023-01-11 | End: 2023-01-15

## 2023-01-11 RX ORDER — ATORVASTATIN CALCIUM 20 MG/1
20 TABLET, FILM COATED ORAL NIGHTLY
Status: DISCONTINUED | OUTPATIENT
Start: 2023-01-11 | End: 2023-01-11 | Stop reason: DRUGHIGH

## 2023-01-11 NOTE — PLAN OF CARE
Problem: Patient Centered Care  Goal: Patient preferences are identified and integrated in the patient's plan of care  Description: Interventions:  - What would you like us to know as we care for you?   - Provide timely, complete, and accurate information to patient/family  - Incorporate patient and family knowledge, values, beliefs, and cultural backgrounds into the planning and delivery of care  - Encourage patient/family to participate in care and decision-making at the level they choose  - Honor patient and family perspectives and choices  Outcome: Progressing     Problem: GASTROINTESTINAL - ADULT  Goal: Minimal or absence of nausea and vomiting  Description: INTERVENTIONS:  - Maintain adequate hydration with IV or PO as ordered and tolerated  - Nasogastric tube to low intermittent suction as ordered  - Evaluate effectiveness of ordered antiemetic medications  - Provide nonpharmacologic comfort measures as appropriate  - Advance diet as tolerated, if ordered  - Obtain nutritional consult as needed  - Evaluate fluid balance  Outcome: Progressing  Goal: Maintains or returns to baseline bowel function  Description: INTERVENTIONS:  - Assess bowel function  - Maintain adequate hydration with IV or PO as ordered and tolerated  - Evaluate effectiveness of GI medications  - Encourage mobilization and activity  - Obtain nutritional consult as needed  - Establish a toileting routine/schedule  - Consider collaborating with pharmacy to review patient's medication profile  Outcome: Progressing  Goal: Maintains adequate nutritional intake (undernourished)  Description: INTERVENTIONS:  - Monitor percentage of each meal consumed  - Identify factors contributing to decreased intake, treat as appropriate  - Assist with meals as needed  - Monitor I&O, WT and lab values  - Obtain nutritional consult as needed  - Optimize oral hygiene and moisture  - Encourage food from home; allow for food preferences  - Enhance eating environment  Outcome: Progressing  Goal: Achieves appropriate nutritional intake (bariatric)  Description: INTERVENTIONS:  - Monitor for over-consumption  - Identify factors contributing to increased intake, treat as appropriate  - Monitor I&O, WT and lab values  - Obtain nutritional consult as needed  - Evaluate psychosocial factors contributing to over-consumption  Outcome: Progressing     Problem: SKIN/TISSUE INTEGRITY - ADULT  Goal: Skin integrity remains intact  Description: INTERVENTIONS  - Assess and document risk factors for pressure ulcer development  - Assess and document skin integrity  - Monitor for areas of redness and/or skin breakdown  - Initiate interventions, skin care algorithm/standards of care as needed  Outcome: Progressing     Problem: PAIN - ADULT  Goal: Verbalizes/displays adequate comfort level or patient's stated pain goal  Description: INTERVENTIONS:  - Encourage pt to monitor pain and request assistance  - Assess pain using appropriate pain scale  - Administer analgesics based on type and severity of pain and evaluate response  - Implement non-pharmacological measures as appropriate and evaluate response  - Consider cultural and social influences on pain and pain management  - Manage/alleviate anxiety  - Utilize distraction and/or relaxation techniques  - Monitor for opioid side effects  - Notify MD/LIP if interventions unsuccessful or patient reports new pain  - Anticipate increased pain with activity and pre-medicate as appropriate  Outcome: Progressing     Patient A/Ox4, Finnish speaking resting in bed, family at bedside. Encourage activity. Complaining of left flank pain, manage as needed. Monitor I&O. BRUCE drain care, q8 irrigate tolerates well. Monitor VS. Continue IV antibiotic. Potassium 3.4, per protocol replaced and redraw tomorrow. Continue IV fluids. Call light within reach.

## 2023-01-11 NOTE — DIETARY NOTE
Brief Nutrition Note:    RD consulted r/t BMI 19.09kg/m2. Pt admitted for acute perforated diverticulum, screened at no nutrition risk by RN at admission. Pt visited yesterday (1/10/23), spouse at bedside.  services used via phone. Pt reported decreased appetite/intake since Friday (3 days prior to admission). Pt was NPO during interaction for procedure, currently on clear liquid diet (CLD) - noted to be tolerating. BRUCE drain placed to LLQ by IR. Pt reports slighty (1-2#) weight loss, usual body weight around 125#. Admission weight noted 111# 3 oz, appeared inaccurate per visual exam. EMR weight review, weight relatively stable prior to admission at 125-130# over last year. Nutrition focused physical exam (NFPE) completed, no wasting noted. Requested reweigh from RN, received weight of 123# which appears more accurate and is consistent with usual body weight. New BMI 21.11kg/m2. Pt is at no nutrition risk at this time. F/u per protocol. Monitor for diet advancement. Please consult nutrition services if earlier intervention is indicated.      Wt Readings from Last 6 Encounters:  01/11/23 : 55.8 kg (123 lb)  11/26/22 : 57.2 kg (126 lb)  10/25/22 : 56.7 kg (125 lb)  04/11/22 : 58.5 kg (129 lb)  10/05/21 : 59 kg (130 lb)  04/08/21 : 62.1 kg (137 lb)    Percent Meals Eaten (last 6 days)     Date/Time Percent Meals Eaten (%)    01/10/23 1008 --     Percent Meals Eaten (%): Pt is NPO this morning for tests at 01/10/23 1008    01/10/23 1329 --     Percent Meals Eaten (%): pt is still NPO at this time at 01/10/23 2313 Dinah Starr MS, ASHLEY, RDN, LDN  Clinical Dietitian  P: [phone]

## 2023-01-11 NOTE — PLAN OF CARE
Patient vital signs stable overnight. BRUCE drain in place, flushed during shift; minimal output noted. Patient c/o pain, PRN morphine given. IV Zosyn given. IV fluids running. No acute changes overnight. Safety precautions in place, continue to monitor. Problem: Patient Centered Care  Goal: Patient preferences are identified and integrated in the patient's plan of care  Description: Interventions:  - What would you like us to know as we care for you?  Palestinian-speaking, from home with .  - Provide timely, complete, and accurate information to patient/family  - Incorporate patient and family knowledge, values, beliefs, and cultural backgrounds into the planning and delivery of care  - Encourage patient/family to participate in care and decision-making at the level they choose  - Honor patient and family perspectives and choices  Outcome: Progressing     Problem: GASTROINTESTINAL - ADULT  Goal: Minimal or absence of nausea and vomiting  Description: INTERVENTIONS:  - Maintain adequate hydration with IV or PO as ordered and tolerated  - Nasogastric tube to low intermittent suction as ordered  - Evaluate effectiveness of ordered antiemetic medications  - Provide nonpharmacologic comfort measures as appropriate  - Advance diet as tolerated, if ordered  - Obtain nutritional consult as needed  - Evaluate fluid balance  Outcome: Progressing  Goal: Maintains or returns to baseline bowel function  Description: INTERVENTIONS:  - Assess bowel function  - Maintain adequate hydration with IV or PO as ordered and tolerated  - Evaluate effectiveness of GI medications  - Encourage mobilization and activity  - Obtain nutritional consult as needed  - Establish a toileting routine/schedule  - Consider collaborating with pharmacy to review patient's medication profile  Outcome: Progressing  Goal: Maintains adequate nutritional intake (undernourished)  Description: INTERVENTIONS:  - Monitor percentage of each meal consumed  - Identify factors contributing to decreased intake, treat as appropriate  - Assist with meals as needed  - Monitor I&O, WT and lab values  - Obtain nutritional consult as needed  - Optimize oral hygiene and moisture  - Encourage food from home; allow for food preferences  - Enhance eating environment  Outcome: Progressing  Goal: Achieves appropriate nutritional intake (bariatric)  Description: INTERVENTIONS:  - Monitor for over-consumption  - Identify factors contributing to increased intake, treat as appropriate  - Monitor I&O, WT and lab values  - Obtain nutritional consult as needed  - Evaluate psychosocial factors contributing to over-consumption  Outcome: Progressing     Problem: SKIN/TISSUE INTEGRITY - ADULT  Goal: Skin integrity remains intact  Description: INTERVENTIONS  - Assess and document risk factors for pressure ulcer development  - Assess and document skin integrity  - Monitor for areas of redness and/or skin breakdown  - Initiate interventions, skin care algorithm/standards of care as needed  Outcome: Progressing     Problem: PAIN - ADULT  Goal: Verbalizes/displays adequate comfort level or patient's stated pain goal  Description: INTERVENTIONS:  - Encourage pt to monitor pain and request assistance  - Assess pain using appropriate pain scale  - Administer analgesics based on type and severity of pain and evaluate response  - Implement non-pharmacological measures as appropriate and evaluate response  - Consider cultural and social influences on pain and pain management  - Manage/alleviate anxiety  - Utilize distraction and/or relaxation techniques  - Monitor for opioid side effects  - Notify MD/LIP if interventions unsuccessful or patient reports new pain  - Anticipate increased pain with activity and pre-medicate as appropriate  Outcome: Progressing

## 2023-01-12 LAB
ANION GAP SERPL CALC-SCNC: 6 MMOL/L (ref 0–18)
BASOPHILS # BLD AUTO: 0.05 X10(3) UL (ref 0–0.2)
BASOPHILS NFR BLD AUTO: 1.2 %
BUN BLD-MCNC: 3 MG/DL (ref 7–18)
BUN/CREAT SERPL: 5.5 (ref 10–20)
CALCIUM BLD-MCNC: 8.2 MG/DL (ref 8.5–10.1)
CHLORIDE SERPL-SCNC: 107 MMOL/L (ref 98–112)
CO2 SERPL-SCNC: 28 MMOL/L (ref 21–32)
CREAT BLD-MCNC: 0.55 MG/DL
DEPRECATED RDW RBC AUTO: 39.9 FL (ref 35.1–46.3)
EOSINOPHIL # BLD AUTO: 0.22 X10(3) UL (ref 0–0.7)
EOSINOPHIL NFR BLD AUTO: 5.2 %
ERYTHROCYTE [DISTWIDTH] IN BLOOD BY AUTOMATED COUNT: 12.3 % (ref 11–15)
GFR SERPLBLD BASED ON 1.73 SQ M-ARVRAT: 103 ML/MIN/1.73M2 (ref 60–?)
GLUCOSE BLD-MCNC: 98 MG/DL (ref 70–99)
HCT VFR BLD AUTO: 28.9 %
HGB BLD-MCNC: 9.9 G/DL
IMM GRANULOCYTES # BLD AUTO: 0.09 X10(3) UL (ref 0–1)
IMM GRANULOCYTES NFR BLD: 2.1 %
LYMPHOCYTES # BLD AUTO: 1.67 X10(3) UL (ref 1–4)
LYMPHOCYTES NFR BLD AUTO: 39.4 %
MCH RBC QN AUTO: 30.1 PG (ref 26–34)
MCHC RBC AUTO-ENTMCNC: 34.3 G/DL (ref 31–37)
MCV RBC AUTO: 87.8 FL
MONOCYTES # BLD AUTO: 0.55 X10(3) UL (ref 0.1–1)
MONOCYTES NFR BLD AUTO: 13 %
NEUTROPHILS # BLD AUTO: 1.66 X10 (3) UL (ref 1.5–7.7)
NEUTROPHILS # BLD AUTO: 1.66 X10(3) UL (ref 1.5–7.7)
NEUTROPHILS NFR BLD AUTO: 39.1 %
OSMOLALITY SERPL CALC.SUM OF ELEC: 289 MOSM/KG (ref 275–295)
PLATELET # BLD AUTO: 382 10(3)UL (ref 150–450)
POTASSIUM SERPL-SCNC: 3.5 MMOL/L (ref 3.5–5.1)
POTASSIUM SERPL-SCNC: 3.5 MMOL/L (ref 3.5–5.1)
RBC # BLD AUTO: 3.29 X10(6)UL
SODIUM SERPL-SCNC: 141 MMOL/L (ref 136–145)
WBC # BLD AUTO: 4.2 X10(3) UL (ref 4–11)

## 2023-01-12 PROCEDURE — 99232 SBSQ HOSP IP/OBS MODERATE 35: CPT | Performed by: SURGERY

## 2023-01-12 PROCEDURE — 99233 SBSQ HOSP IP/OBS HIGH 50: CPT | Performed by: HOSPITALIST

## 2023-01-12 RX ORDER — HYDRALAZINE HYDROCHLORIDE 20 MG/ML
10 INJECTION INTRAMUSCULAR; INTRAVENOUS EVERY 4 HOURS PRN
Status: DISCONTINUED | OUTPATIENT
Start: 2023-01-12 | End: 2023-01-15

## 2023-01-12 NOTE — PLAN OF CARE
Problem: Patient Centered Care  Goal: Patient preferences are identified and integrated in the patient's plan of care  Description: Interventions:  - What would you like us to know as we care for you?  Patient states she has chronic damaris, with occasional flare-ups  - Provide timely, complete, and accurate information to patient/family  - Incorporate patient and family knowledge, values, beliefs, and cultural backgrounds into the planning and delivery of care  - Encourage patient/family to participate in care and decision-making at the level they choose  - Honor patient and family perspectives and choices  Outcome: Progressing        Problem: GASTROINTESTINAL - ADULT  Goal: Minimal or absence of nausea and vomiting  Description: INTERVENTIONS:  - Maintain adequate hydration with IV or PO as ordered and tolerated  - Nasogastric tube to low intermittent suction as ordered  - Evaluate effectiveness of ordered antiemetic medications  - Provide nonpharmacologic comfort measures as appropriate  - Advance diet as tolerated, if ordered  - Obtain nutritional consult as needed  - Evaluate fluid balance  Outcome: Progressing  Goal: Maintains or returns to baseline bowel function  Description: INTERVENTIONS:  - Assess bowel function  - Maintain adequate hydration with IV or PO as ordered and tolerated  - Evaluate effectiveness of GI medications  - Encourage mobilization and activity  - Obtain nutritional consult as needed  - Establish a toileting routine/schedule  - Consider collaborating with pharmacy to review patient's medication profile  Outcome: Progressing  Goal: Maintains adequate nutritional intake (undernourished)  Description: INTERVENTIONS:  - Monitor percentage of each meal consumed  - Identify factors contributing to decreased intake, treat as appropriate  - Assist with meals as needed  - Monitor I&O, WT and lab values  - Obtain nutritional consult as needed  - Optimize oral hygiene and moisture  - Encourage food from home; allow for food preferences  - Enhance eating environment  Outcome: Progressing  Goal: Achieves appropriate nutritional intake (bariatric)  Description: INTERVENTIONS:  - Monitor for over-consumption  - Identify factors contributing to increased intake, treat as appropriate  - Monitor I&O, WT and lab values  - Obtain nutritional consult as needed  - Evaluate psychosocial factors contributing to over-consumption  Outcome: Progressing     Problem: SKIN/TISSUE INTEGRITY - ADULT  Goal: Skin integrity remains intact  Description: INTERVENTIONS  - Assess and document risk factors for pressure ulcer development  - Assess and document skin integrity  - Monitor for areas of redness and/or skin breakdown  - Initiate interventions, skin care algorithm/standards of care as needed  Outcome: Progressing     Problem: PAIN - ADULT  Goal: Verbalizes/displays adequate comfort level or patient's stated pain goal  Description: INTERVENTIONS:  - Encourage pt to monitor pain and request assistance  - Assess pain using appropriate pain scale  - Administer analgesics based on type and severity of pain and evaluate response  - Implement non-pharmacological measures as appropriate and evaluate response  - Consider cultural and social influences on pain and pain management  - Manage/alleviate anxiety  - Utilize distraction and/or relaxation techniques  - Monitor for opioid side effects  - Notify MD/LIP if interventions unsuccessful or patient reports new pain  - Anticipate increased pain with activity and pre-medicate as appropriate  Outcome: Progressing

## 2023-01-12 NOTE — PLAN OF CARE
No acute changes on this shift. BRUCE drain irrigated per orders. Output recorded. Advanced to full liquid diet and tolerating well. IVF discontinued today. Call light within reach, needs met.      Problem: Patient Centered Care  Goal: Patient preferences are identified and integrated in the patient's plan of care  Description: Interventions:  Problem: GASTROINTESTINAL - ADULT  Goal: Minimal or absence of nausea and vomiting  Description: INTERVENTIONS:  - Maintain adequate hydration with IV or PO as ordered and tolerated  - Nasogastric tube to low intermittent suction as ordered  - Evaluate effectiveness of ordered antiemetic medications  - Provide nonpharmacologic comfort measures as appropriate  - Advance diet as tolerated, if ordered  - Obtain nutritional consult as needed  - Evaluate fluid balance  Outcome: Progressing  Goal: Maintains or returns to baseline bowel function  Description: INTERVENTIONS:  - Assess bowel function  - Maintain adequate hydration with IV or PO as ordered and tolerated  - Evaluate effectiveness of GI medications  - Encourage mobilization and activity  - Obtain nutritional consult as needed  - Establish a toileting routine/schedule  - Consider collaborating with pharmacy to review patient's medication profile  Outcome: Progressing  Goal: Maintains adequate nutritional intake (undernourished)  Description: INTERVENTIONS:  - Monitor percentage of each meal consumed  - Identify factors contributing to decreased intake, treat as appropriate  - Assist with meals as needed  - Monitor I&O, WT and lab values  - Obtain nutritional consult as needed  - Optimize oral hygiene and moisture  - Encourage food from home; allow for food preferences  - Enhance eating environment  Outcome: Progressing  Goal: Achieves appropriate nutritional intake (bariatric)  Description: INTERVENTIONS:  - Monitor for over-consumption  - Identify factors contributing to increased intake, treat as appropriate  - Monitor I&O, WT and lab values  - Obtain nutritional consult as needed  - Evaluate psychosocial factors contributing to over-consumption  Outcome: Progressing     Problem: SKIN/TISSUE INTEGRITY - ADULT  Goal: Skin integrity remains intact  Description: INTERVENTIONS  - Assess and document risk factors for pressure ulcer development  - Assess and document skin integrity  - Monitor for areas of redness and/or skin breakdown  - Initiate interventions, skin care algorithm/standards of care as needed  Outcome: Progressing     Problem: PAIN - ADULT  Goal: Verbalizes/displays adequate comfort level or patient's stated pain goal  Description: INTERVENTIONS:  - Encourage pt to monitor pain and request assistance  - Assess pain using appropriate pain scale  - Administer analgesics based on type and severity of pain and evaluate response  - Implement non-pharmacological measures as appropriate and evaluate response  - Consider cultural and social influences on pain and pain management  - Manage/alleviate anxiety  - Utilize distraction and/or relaxation techniques  - Monitor for opioid side effects  - Notify MD/LIP if interventions unsuccessful or patient reports new pain  - Anticipate increased pain with activity and pre-medicate as appropriate  Outcome: Progressing     - Provide timely, complete, and accurate information to patient/family  - Incorporate patient and family knowledge, values, beliefs, and cultural backgrounds into the planning and delivery of care  - Encourage patient/family to participate in care and decision-making at the level they choose  - Honor patient and family perspectives and choices  Outcome: Progressing

## 2023-01-13 ENCOUNTER — TELEPHONE (OUTPATIENT)
Dept: FAMILY MEDICINE CLINIC | Facility: CLINIC | Age: 63
End: 2023-01-13

## 2023-01-13 ENCOUNTER — APPOINTMENT (OUTPATIENT)
Dept: PICC SERVICES | Facility: HOSPITAL | Age: 63
End: 2023-01-13
Attending: PHYSICIAN ASSISTANT
Payer: MEDICARE

## 2023-01-13 LAB
DEPRECATED RDW RBC AUTO: 39.8 FL (ref 35.1–46.3)
ERYTHROCYTE [DISTWIDTH] IN BLOOD BY AUTOMATED COUNT: 12.3 % (ref 11–15)
HCT VFR BLD AUTO: 31.9 %
HCT VFR BLD AUTO: 31.9 %
HGB BLD-MCNC: 10.8 G/DL
HGB BLD-MCNC: 10.9 G/DL
MCH RBC QN AUTO: 29.8 PG (ref 26–34)
MCHC RBC AUTO-ENTMCNC: 33.9 G/DL (ref 31–37)
MCV RBC AUTO: 88.1 FL
PLATELET # BLD AUTO: 462 10(3)UL (ref 150–450)
RBC # BLD AUTO: 3.62 X10(6)UL
WBC # BLD AUTO: 3.9 X10(3) UL (ref 4–11)

## 2023-01-13 PROCEDURE — 99232 SBSQ HOSP IP/OBS MODERATE 35: CPT | Performed by: SURGERY

## 2023-01-13 PROCEDURE — 02HV33Z INSERTION OF INFUSION DEVICE INTO SUPERIOR VENA CAVA, PERCUTANEOUS APPROACH: ICD-10-PCS | Performed by: HOSPITALIST

## 2023-01-13 PROCEDURE — 99233 SBSQ HOSP IP/OBS HIGH 50: CPT | Performed by: HOSPITALIST

## 2023-01-13 RX ORDER — METRONIDAZOLE 500 MG/1
500 TABLET ORAL EVERY 8 HOURS SCHEDULED
Status: DISCONTINUED | OUTPATIENT
Start: 2023-01-13 | End: 2023-01-15

## 2023-01-13 NOTE — PLAN OF CARE
Problem: Patient Centered Care  Goal: Patient preferences are identified and integrated in the patient's plan of care  Description: Interventions:  Problem: GASTROINTESTINAL - ADULT  Goal: Minimal or absence of nausea and vomiting  Description: INTERVENTIONS:  - Maintain adequate hydration with IV or PO as ordered and tolerated  - Nasogastric tube to low intermittent suction as ordered  - Evaluate effectiveness of ordered antiemetic medications  - Provide nonpharmacologic comfort measures as appropriate  - Advance diet as tolerated, if ordered  - Obtain nutritional consult as needed  - Evaluate fluid balance  Outcome: Progressing  Goal: Maintains or returns to baseline bowel function  Description: INTERVENTIONS:  - Assess bowel function  - Maintain adequate hydration with IV or PO as ordered and tolerated  - Evaluate effectiveness of GI medications  - Encourage mobilization and activity  - Obtain nutritional consult as needed  - Establish a toileting routine/schedule  - Consider collaborating with pharmacy to review patient's medication profile  Outcome: Progressing  Goal: Maintains adequate nutritional intake (undernourished)  Description: INTERVENTIONS:  - Monitor percentage of each meal consumed  - Identify factors contributing to decreased intake, treat as appropriate  - Assist with meals as needed  - Monitor I&O, WT and lab values  - Obtain nutritional consult as needed  - Optimize oral hygiene and moisture  - Encourage food from home; allow for food preferences  - Enhance eating environment  Outcome: Progressing  Goal: Achieves appropriate nutritional intake (bariatric)  Description: INTERVENTIONS:  - Monitor for over-consumption  - Identify factors contributing to increased intake, treat as appropriate  - Monitor I&O, WT and lab values  - Obtain nutritional consult as needed  - Evaluate psychosocial factors contributing to over-consumption  Outcome: Progressing     Problem: SKIN/TISSUE INTEGRITY - ADULT  Goal: Skin integrity remains intact  Description: INTERVENTIONS  - Assess and document risk factors for pressure ulcer development  - Assess and document skin integrity  - Monitor for areas of redness and/or skin breakdown  - Initiate interventions, skin care algorithm/standards of care as needed  Outcome: Progressing     Problem: PAIN - ADULT  Goal: Verbalizes/displays adequate comfort level or patient's stated pain goal  Description: INTERVENTIONS:  - Encourage pt to monitor pain and request assistance  - Assess pain using appropriate pain scale  - Administer analgesics based on type and severity of pain and evaluate response  - Implement non-pharmacological measures as appropriate and evaluate response  - Consider cultural and social influences on pain and pain management  - Manage/alleviate anxiety  - Utilize distraction and/or relaxation techniques  - Monitor for opioid side effects  - Notify MD/LIP if interventions unsuccessful or patient reports new pain  - Anticipate increased pain with activity and pre-medicate as appropriate  Outcome: Progressing     - Provide timely, complete, and accurate information to patient/family  - Incorporate patient and family knowledge, values, beliefs, and cultural backgrounds into the planning and delivery of care  - Encourage patient/family to participate in care and decision-making at the level they choose  - Honor patient and family perspectives and choices  Outcome: Progressing

## 2023-01-13 NOTE — DISCHARGE INSTRUCTIONS
Flush drain with 5mL Normal Saline 3x per week     Sometimes managing your health at home requires assistance. The [place] team has recognized your preference to use [facility]. They can be reached by phone at [phone]. The fax number for your reference is [phone]. A representative from the home health agency will contact you or your family to schedule your first visit.       Jeff Garay RN  [facility] Liaison  [phone]    Home infusion services will be provided by:    [facility]/I infusion  [address].  Jeffery Starr, [address]  Phone: [phone]  Fax: [phone]

## 2023-01-13 NOTE — CM/SW NOTE
01/13/23 0800   /SW Referral Data   Referral Source    Reason for Referral Discharge planning   Informant Patient;Spouse/Significant Other   Pertinent Medical Hx   Does patient have an established PCP? Yes  Gonzalo Lowery)   Patient Info   Patient's Current Mental Status at Time of Assessment Alert;Oriented   Patient's [address]   Patient lives with Spouse/Significant other   Patient Status Prior to Admission   Independent with ADLs and Mobility Yes   Discharge Needs   Anticipated D/C needs Home health care; Infusion care   Choice of Post-Acute Provider   Informed patient of right to choose their preferred provider Yes     Pt discussed during nursing rounds. Dx perforated diverticulum, new BRUCE by IR. From home w/spouse, independent and active at baseline. Pt will now require LT IV abx at [place] per surgeon. HH and infusion referrals sent in [place], [facility]. Devin 60 entered. Final ID recommendation will needed for cost and infusion provider choice. List of accepting [facility] agencies will be needed for choice. New PICC or midline will be needed prior to [facility], RN is aware. 1345: [facility] will provide home infusions services for home IV abx at [facility], script already submitted to by [facility]. Carlos Garcia notified  that pt has chosen [facility] at [place]. RN and MD made aware of the above. Plan: Home w/spouse with [facility] and [facility] for home infusion services for IV abx pending medical clearance. / to remain available for support and/or discharge planning.      CARIE Zamora    [phone]

## 2023-01-13 NOTE — TELEPHONE ENCOUNTER
Gladys Kims from [facility] asking if Dr. Nahun Kong can sign and follow through with patients home health. Requesting orders for home health nurse visits.     PH# [phone]

## 2023-01-13 NOTE — PLAN OF CARE
Problem: Patient Centered Care  Goal: Patient preferences are identified and integrated in the patient's plan of care  Description: Interventions:  - What would you like us to know as we care for you?  Patient is from home with her .  - Provide timely, complete, and accurate information to patient/family  - Incorporate patient and family knowledge, values, beliefs, and cultural backgrounds into the planning and delivery of care  - Encourage patient/family to participate in care and decision-making at the level they choose  - Honor patient and family perspectives and choices  Outcome: Progressing       Problem: GASTROINTESTINAL - ADULT  Goal: Minimal or absence of nausea and vomiting  Description: INTERVENTIONS:  - Maintain adequate hydration with IV or PO as ordered and tolerated  - Nasogastric tube to low intermittent suction as ordered  - Evaluate effectiveness of ordered antiemetic medications  - Provide nonpharmacologic comfort measures as appropriate  - Advance diet as tolerated, if ordered  - Obtain nutritional consult as needed  - Evaluate fluid balance  Outcome: Progressing  Goal: Maintains or returns to baseline bowel function  Description: INTERVENTIONS:  - Assess bowel function  - Maintain adequate hydration with IV or PO as ordered and tolerated  - Evaluate effectiveness of GI medications  - Encourage mobilization and activity  - Obtain nutritional consult as needed  - Establish a toileting routine/schedule  - Consider collaborating with pharmacy to review patient's medication profile  Outcome: Progressing  Goal: Maintains adequate nutritional intake (undernourished)  Description: INTERVENTIONS:  - Monitor percentage of each meal consumed  - Identify factors contributing to decreased intake, treat as appropriate  - Assist with meals as needed  - Monitor I&O, WT and lab values  - Obtain nutritional consult as needed  - Optimize oral hygiene and moisture  - Encourage food from home; allow for food preferences  - Enhance eating environment  Outcome: Progressing  Goal: Achieves appropriate nutritional intake (bariatric)  Description: INTERVENTIONS:  - Monitor for over-consumption  - Identify factors contributing to increased intake, treat as appropriate  - Monitor I&O, WT and lab values  - Obtain nutritional consult as needed  - Evaluate psychosocial factors contributing to over-consumption  Outcome: Progressing     Problem: SKIN/TISSUE INTEGRITY - ADULT  Goal: Skin integrity remains intact  Description: INTERVENTIONS  - Assess and document risk factors for pressure ulcer development  - Assess and document skin integrity  - Monitor for areas of redness and/or skin breakdown  - Initiate interventions, skin care algorithm/standards of care as needed  Outcome: Progressing     Problem: PAIN - ADULT  Goal: Verbalizes/displays adequate comfort level or patient's stated pain goal  Description: INTERVENTIONS:  - Encourage pt to monitor pain and request assistance  - Assess pain using appropriate pain scale  - Administer analgesics based on type and severity of pain and evaluate response  - Implement non-pharmacological measures as appropriate and evaluate response  - Consider cultural and social influences on pain and pain management  - Manage/alleviate anxiety  - Utilize distraction and/or relaxation techniques  - Monitor for opioid side effects  - Notify MD/LIP if interventions unsuccessful or patient reports new pain  - Anticipate increased pain with activity and pre-medicate as appropriate  Outcome: Progressing

## 2023-01-13 NOTE — HOME CARE LIAISON
Patient and spouse met with and provided with list of [facility] providers from [facility], patient choice is [facility]. Agency reserved in [facility] and contact information placed on AVS. Financial interest disclosure provided to patient. CLAIRE mccoy.

## 2023-01-14 LAB
HCT VFR BLD AUTO: 34.2 %
HGB BLD-MCNC: 11.6 G/DL

## 2023-01-14 PROCEDURE — 99233 SBSQ HOSP IP/OBS HIGH 50: CPT | Performed by: HOSPITALIST

## 2023-01-14 PROCEDURE — 99233 SBSQ HOSP IP/OBS HIGH 50: CPT | Performed by: SURGERY

## 2023-01-14 NOTE — PLAN OF CARE
Problem: Patient Centered Care  Goal: Patient preferences are identified and integrated in the patient's plan of care  Description: Interventions:  - What would you like us to know as we care for you?  Pt states that her family is very involved in her care,  - Provide timely, complete, and accurate information to patient/family  - Incorporate patient and family knowledge, values, beliefs, and cultural backgrounds into the planning and delivery of care  - Encourage patient/family to participate in care and decision-making at the level they choose  - Honor patient and family perspectives and choices  Outcome: Progressing     Problem: GASTROINTESTINAL - ADULT  Goal: Minimal or absence of nausea and vomiting  Description: INTERVENTIONS:  - Maintain adequate hydration with IV or PO as ordered and tolerated  - Nasogastric tube to low intermittent suction as ordered  - Evaluate effectiveness of ordered antiemetic medications  - Provide nonpharmacologic comfort measures as appropriate  - Advance diet as tolerated, if ordered  - Obtain nutritional consult as needed  - Evaluate fluid balance  Outcome: Progressing  Goal: Maintains or returns to baseline bowel function  Description: INTERVENTIONS:  - Assess bowel function  - Maintain adequate hydration with IV or PO as ordered and tolerated  - Evaluate effectiveness of GI medications  - Encourage mobilization and activity  - Obtain nutritional consult as needed  - Establish a toileting routine/schedule  - Consider collaborating with pharmacy to review patient's medication profile  Outcome: Progressing  Goal: Maintains adequate nutritional intake (undernourished)  Description: INTERVENTIONS:  - Monitor percentage of each meal consumed  - Identify factors contributing to decreased intake, treat as appropriate  - Assist with meals as needed  - Monitor I&O, WT and lab values  - Obtain nutritional consult as needed  - Optimize oral hygiene and moisture  - Encourage food from home; allow for food preferences  - Enhance eating environment  Outcome: Progressing  Goal: Achieves appropriate nutritional intake (bariatric)  Description: INTERVENTIONS:  - Monitor for over-consumption  - Identify factors contributing to increased intake, treat as appropriate  - Monitor I&O, WT and lab values  - Obtain nutritional consult as needed  - Evaluate psychosocial factors contributing to over-consumption  Outcome: Progressing     Problem: SKIN/TISSUE INTEGRITY - ADULT  Goal: Skin integrity remains intact  Description: INTERVENTIONS  - Assess and document risk factors for pressure ulcer development  - Assess and document skin integrity  - Monitor for areas of redness and/or skin breakdown  - Initiate interventions, skin care algorithm/standards of care as needed  Outcome: Progressing     Problem: PAIN - ADULT  Goal: Verbalizes/displays adequate comfort level or patient's stated pain goal  Description: INTERVENTIONS:  - Encourage pt to monitor pain and request assistance  - Assess pain using appropriate pain scale  - Administer analgesics based on type and severity of pain and evaluate response  - Implement non-pharmacological measures as appropriate and evaluate response  - Consider cultural and social influences on pain and pain management  - Manage/alleviate anxiety  - Utilize distraction and/or relaxation techniques  - Monitor for opioid side effects  - Notify MD/LIP if interventions unsuccessful or patient reports new pain  - Anticipate increased pain with activity and pre-medicate as appropriate  Outcome: Progressing

## 2023-01-14 NOTE — PLAN OF CARE
Vital signs monitored per unit routine with no acute changes at this time. BRUCE drain output monitored and charted. 1 episode of loose stool dark red/maroon in color; MD aware; rechecked H&H; NNO at this time. Safety precautions maintained and frequent rounding provided by nursing staff. Problem: Patient Centered Care  Goal: Patient preferences are identified and integrated in the patient's plan of care  Description: Interventions:  - What would you like us to know as we care for you?  From home with   - Provide timely, complete, and accurate information to patient/family  - Incorporate patient and family knowledge, values, beliefs, and cultural backgrounds into the planning and delivery of care  - Encourage patient/family to participate in care and decision-making at the level they choose  - Honor patient and family perspectives and choices  Outcome: Progressing     Problem: GASTROINTESTINAL - ADULT  Goal: Minimal or absence of nausea and vomiting  Description: INTERVENTIONS:  - Maintain adequate hydration with IV or PO as ordered and tolerated  - Nasogastric tube to low intermittent suction as ordered  - Evaluate effectiveness of ordered antiemetic medications  - Provide nonpharmacologic comfort measures as appropriate  - Advance diet as tolerated, if ordered  - Obtain nutritional consult as needed  - Evaluate fluid balance  Outcome: Progressing  Goal: Maintains or returns to baseline bowel function  Description: INTERVENTIONS:  - Assess bowel function  - Maintain adequate hydration with IV or PO as ordered and tolerated  - Evaluate effectiveness of GI medications  - Encourage mobilization and activity  - Obtain nutritional consult as needed  - Establish a toileting routine/schedule  - Consider collaborating with pharmacy to review patient's medication profile  Outcome: Progressing  Goal: Maintains adequate nutritional intake (undernourished)  Description: INTERVENTIONS:  - Monitor percentage of each meal consumed  - Identify factors contributing to decreased intake, treat as appropriate  - Assist with meals as needed  - Monitor I&O, WT and lab values  - Obtain nutritional consult as needed  - Optimize oral hygiene and moisture  - Encourage food from home; allow for food preferences  - Enhance eating environment  Outcome: Progressing  Goal: Achieves appropriate nutritional intake (bariatric)  Description: INTERVENTIONS:  - Monitor for over-consumption  - Identify factors contributing to increased intake, treat as appropriate  - Monitor I&O, WT and lab values  - Obtain nutritional consult as needed  - Evaluate psychosocial factors contributing to over-consumption  Outcome: Progressing     Problem: SKIN/TISSUE INTEGRITY - ADULT  Goal: Skin integrity remains intact  Description: INTERVENTIONS  - Assess and document risk factors for pressure ulcer development  - Assess and document skin integrity  - Monitor for areas of redness and/or skin breakdown  - Initiate interventions, skin care algorithm/standards of care as needed  Outcome: Progressing     Problem: PAIN - ADULT  Goal: Verbalizes/displays adequate comfort level or patient's stated pain goal  Description: INTERVENTIONS:  - Encourage pt to monitor pain and request assistance  - Assess pain using appropriate pain scale  - Administer analgesics based on type and severity of pain and evaluate response  - Implement non-pharmacological measures as appropriate and evaluate response  - Consider cultural and social influences on pain and pain management  - Manage/alleviate anxiety  - Utilize distraction and/or relaxation techniques  - Monitor for opioid side effects  - Notify MD/LIP if interventions unsuccessful or patient reports new pain  - Anticipate increased pain with activity and pre-medicate as appropriate  Outcome: Progressing

## 2023-01-15 VITALS
RESPIRATION RATE: 16 BRPM | OXYGEN SATURATION: 94 % | HEART RATE: 63 BPM | BODY MASS INDEX: 21 KG/M2 | DIASTOLIC BLOOD PRESSURE: 74 MMHG | HEIGHT: 64 IN | WEIGHT: 123 LBS | SYSTOLIC BLOOD PRESSURE: 135 MMHG | TEMPERATURE: 98 F

## 2023-01-15 LAB
ANION GAP SERPL CALC-SCNC: 5 MMOL/L (ref 0–18)
BASOPHILS # BLD AUTO: 0.06 X10(3) UL (ref 0–0.2)
BASOPHILS NFR BLD AUTO: 1 %
BUN BLD-MCNC: 6 MG/DL (ref 7–18)
BUN/CREAT SERPL: 9.4 (ref 10–20)
CALCIUM BLD-MCNC: 9 MG/DL (ref 8.5–10.1)
CHLORIDE SERPL-SCNC: 105 MMOL/L (ref 98–112)
CO2 SERPL-SCNC: 31 MMOL/L (ref 21–32)
CREAT BLD-MCNC: 0.64 MG/DL
DEPRECATED RDW RBC AUTO: 39.8 FL (ref 35.1–46.3)
EOSINOPHIL # BLD AUTO: 0.26 X10(3) UL (ref 0–0.7)
EOSINOPHIL NFR BLD AUTO: 4.5 %
ERYTHROCYTE [DISTWIDTH] IN BLOOD BY AUTOMATED COUNT: 12.3 % (ref 11–15)
GFR SERPLBLD BASED ON 1.73 SQ M-ARVRAT: 99 ML/MIN/1.73M2 (ref 60–?)
GLUCOSE BLD-MCNC: 105 MG/DL (ref 70–99)
HCT VFR BLD AUTO: 31.8 %
HGB BLD-MCNC: 10.8 G/DL
IMM GRANULOCYTES # BLD AUTO: 0.14 X10(3) UL (ref 0–1)
IMM GRANULOCYTES NFR BLD: 2.4 %
LYMPHOCYTES # BLD AUTO: 1.85 X10(3) UL (ref 1–4)
LYMPHOCYTES NFR BLD AUTO: 32.1 %
MCH RBC QN AUTO: 29.8 PG (ref 26–34)
MCHC RBC AUTO-ENTMCNC: 34 G/DL (ref 31–37)
MCV RBC AUTO: 87.6 FL
MONOCYTES # BLD AUTO: 0.6 X10(3) UL (ref 0.1–1)
MONOCYTES NFR BLD AUTO: 10.4 %
NEUTROPHILS # BLD AUTO: 2.86 X10 (3) UL (ref 1.5–7.7)
NEUTROPHILS # BLD AUTO: 2.86 X10(3) UL (ref 1.5–7.7)
NEUTROPHILS NFR BLD AUTO: 49.6 %
OSMOLALITY SERPL CALC.SUM OF ELEC: 290 MOSM/KG (ref 275–295)
PLATELET # BLD AUTO: 439 10(3)UL (ref 150–450)
POTASSIUM SERPL-SCNC: 3.4 MMOL/L (ref 3.5–5.1)
RBC # BLD AUTO: 3.63 X10(6)UL
SODIUM SERPL-SCNC: 141 MMOL/L (ref 136–145)
WBC # BLD AUTO: 5.8 X10(3) UL (ref 4–11)

## 2023-01-15 PROCEDURE — 99239 HOSP IP/OBS DSCHRG MGMT >30: CPT | Performed by: HOSPITALIST

## 2023-01-15 RX ORDER — POTASSIUM CHLORIDE 20 MEQ/1
40 TABLET, EXTENDED RELEASE ORAL ONCE
Status: COMPLETED | OUTPATIENT
Start: 2023-01-15 | End: 2023-01-15

## 2023-01-15 RX ORDER — CEFTRIAXONE SODIUM 1 G/50ML
1 INJECTION, SOLUTION INTRAVENOUS EVERY 24 HOURS
Refills: 0 | Status: SHIPPED | COMMUNITY
Start: 2023-01-15 | End: 2023-01-25

## 2023-01-15 RX ORDER — METRONIDAZOLE 500 MG/1
500 TABLET ORAL EVERY 8 HOURS SCHEDULED
Qty: 6 TABLET | Refills: 0 | Status: SHIPPED | OUTPATIENT
Start: 2023-01-15 | End: 2023-01-17

## 2023-01-15 NOTE — PLAN OF CARE
Pt discharged today to home with  providing transportation; home infusions set up per SW to start tomorrow. Pt discharged with R upper extremity PICC. AVS reviewed with pt and  and pertinent discharge teaching completed. All belongings returned to patient and nurse  emptied. Problem: Patient Centered Care  Goal: Patient preferences are identified and integrated in the patient's plan of care  Description: Interventions:  - What would you like us to know as we care for you?  From home with    - Provide timely, complete, and accurate information to patient/family  - Incorporate patient and family knowledge, values, beliefs, and cultural backgrounds into the planning and delivery of care  - Encourage patient/family to participate in care and decision-making at the level they choose  - Honor patient and family perspectives and choices  Outcome: Adequate for Discharge     Problem: GASTROINTESTINAL - ADULT  Goal: Minimal or absence of nausea and vomiting  Description: INTERVENTIONS:  - Maintain adequate hydration with IV or PO as ordered and tolerated  - Nasogastric tube to low intermittent suction as ordered  - Evaluate effectiveness of ordered antiemetic medications  - Provide nonpharmacologic comfort measures as appropriate  - Advance diet as tolerated, if ordered  - Obtain nutritional consult as needed  - Evaluate fluid balance  Outcome: Adequate for Discharge  Goal: Maintains or returns to baseline bowel function  Description: INTERVENTIONS:  - Assess bowel function  - Maintain adequate hydration with IV or PO as ordered and tolerated  - Evaluate effectiveness of GI medications  - Encourage mobilization and activity  - Obtain nutritional consult as needed  - Establish a toileting routine/schedule  - Consider collaborating with pharmacy to review patient's medication profile  Outcome: Adequate for Discharge  Goal: Maintains adequate nutritional intake (undernourished)  Description: INTERVENTIONS:  - Monitor percentage of each meal consumed  - Identify factors contributing to decreased intake, treat as appropriate  - Assist with meals as needed  - Monitor I&O, WT and lab values  - Obtain nutritional consult as needed  - Optimize oral hygiene and moisture  - Encourage food from home; allow for food preferences  - Enhance eating environment  Outcome: Adequate for Discharge  Goal: Achieves appropriate nutritional intake (bariatric)  Description: INTERVENTIONS:  - Monitor for over-consumption  - Identify factors contributing to increased intake, treat as appropriate  - Monitor I&O, WT and lab values  - Obtain nutritional consult as needed  - Evaluate psychosocial factors contributing to over-consumption  Outcome: Adequate for Discharge     Problem: SKIN/TISSUE INTEGRITY - ADULT  Goal: Skin integrity remains intact  Description: INTERVENTIONS  - Assess and document risk factors for pressure ulcer development  - Assess and document skin integrity  - Monitor for areas of redness and/or skin breakdown  - Initiate interventions, skin care algorithm/standards of care as needed  Outcome: Adequate for Discharge     Problem: PAIN - ADULT  Goal: Verbalizes/displays adequate comfort level or patient's stated pain goal  Description: INTERVENTIONS:  - Encourage pt to monitor pain and request assistance  - Assess pain using appropriate pain scale  - Administer analgesics based on type and severity of pain and evaluate response  - Implement non-pharmacological measures as appropriate and evaluate response  - Consider cultural and social influences on pain and pain management  - Manage/alleviate anxiety  - Utilize distraction and/or relaxation techniques  - Monitor for opioid side effects  - Notify MD/LIP if interventions unsuccessful or patient reports new pain  - Anticipate increased pain with activity and pre-medicate as appropriate  Outcome: Adequate for Discharge

## 2023-01-15 NOTE — CM/SW NOTE
DC order place. BETO spoke with Shannon Sequeira with [facility] P: [phone] - confirmed DC & delivery will be tomorrow morning 1/16 before 11 AM. SOC with [facility] will be tomorrow after 11AM. SW notified [facility] liaison. PLAN: home with [address] [place] [address],[address] [address] today 1/15, [facility] & delivery tomorrow 1/16    SW remains available for support and/or discharge planning. Please do not hesitate to call/chat SW if further DC needs arise.      Joshua VALENTE, [place], [place]   Ext [phone]

## 2023-01-16 ENCOUNTER — LAB REQUISITION (OUTPATIENT)
Dept: LAB | Facility: HOSPITAL | Age: 63
End: 2023-01-16
Payer: MEDICARE

## 2023-01-16 DIAGNOSIS — K57.20 DIVERTICULITIS OF LARGE INTESTINE WITH PERFORATION AND ABSCESS WITHOUT BLEEDING: ICD-10-CM

## 2023-01-16 LAB
ALBUMIN SERPL-MCNC: 3.3 G/DL (ref 3.4–5)
ALBUMIN/GLOB SERPL: 0.8 {RATIO} (ref 1–2)
ALP LIVER SERPL-CCNC: 55 U/L
ALT SERPL-CCNC: 35 U/L
ANION GAP SERPL CALC-SCNC: 6 MMOL/L (ref 0–18)
AST SERPL-CCNC: 42 U/L (ref 15–37)
BASOPHILS # BLD AUTO: 0.08 X10(3) UL (ref 0–0.2)
BASOPHILS NFR BLD AUTO: 1 %
BILIRUB SERPL-MCNC: 0.2 MG/DL (ref 0.1–2)
BUN BLD-MCNC: 10 MG/DL (ref 7–18)
BUN/CREAT SERPL: 16.1 (ref 10–20)
CALCIUM BLD-MCNC: 9 MG/DL (ref 8.5–10.1)
CHLORIDE SERPL-SCNC: 106 MMOL/L (ref 98–112)
CO2 SERPL-SCNC: 27 MMOL/L (ref 21–32)
CREAT BLD-MCNC: 0.62 MG/DL
DEPRECATED RDW RBC AUTO: 41.9 FL (ref 35.1–46.3)
EOSINOPHIL # BLD AUTO: 0.19 X10(3) UL (ref 0–0.7)
EOSINOPHIL NFR BLD AUTO: 2.4 %
ERYTHROCYTE [DISTWIDTH] IN BLOOD BY AUTOMATED COUNT: 12.6 % (ref 11–15)
FASTING STATUS PATIENT QL REPORTED: YES
GFR SERPLBLD BASED ON 1.73 SQ M-ARVRAT: 100 ML/MIN/1.73M2 (ref 60–?)
GLOBULIN PLAS-MCNC: 3.9 G/DL (ref 2.8–4.4)
GLUCOSE BLD-MCNC: 75 MG/DL (ref 70–99)
HCT VFR BLD AUTO: 35.3 %
HGB BLD-MCNC: 11.6 G/DL
IMM GRANULOCYTES # BLD AUTO: 0.17 X10(3) UL (ref 0–1)
IMM GRANULOCYTES NFR BLD: 2.2 %
LYMPHOCYTES # BLD AUTO: 2.21 X10(3) UL (ref 1–4)
LYMPHOCYTES NFR BLD AUTO: 28.2 %
MCH RBC QN AUTO: 29.5 PG (ref 26–34)
MCHC RBC AUTO-ENTMCNC: 32.9 G/DL (ref 31–37)
MCV RBC AUTO: 89.8 FL
MONOCYTES # BLD AUTO: 0.63 X10(3) UL (ref 0.1–1)
MONOCYTES NFR BLD AUTO: 8 %
NEUTROPHILS # BLD AUTO: 4.55 X10 (3) UL (ref 1.5–7.7)
NEUTROPHILS # BLD AUTO: 4.55 X10(3) UL (ref 1.5–7.7)
NEUTROPHILS NFR BLD AUTO: 58.2 %
OSMOLALITY SERPL CALC.SUM OF ELEC: 286 MOSM/KG (ref 275–295)
PLATELET # BLD AUTO: 507 10(3)UL (ref 150–450)
POTASSIUM SERPL-SCNC: 4 MMOL/L (ref 3.5–5.1)
PROT SERPL-MCNC: 7.2 G/DL (ref 6.4–8.2)
RBC # BLD AUTO: 3.93 X10(6)UL
SODIUM SERPL-SCNC: 139 MMOL/L (ref 136–145)
WBC # BLD AUTO: 7.8 X10(3) UL (ref 4–11)

## 2023-01-16 PROCEDURE — 85025 COMPLETE CBC W/AUTO DIFF WBC: CPT

## 2023-01-16 PROCEDURE — 80053 COMPREHEN METABOLIC PANEL: CPT

## 2023-01-16 NOTE — TELEPHONE ENCOUNTER
Contacted [facility] and spoke to answering service, unable to relay message as representative took message and will have someone else return call. Please inform of Dr. Georgina Blanton note when staff returns call.

## 2023-01-17 ENCOUNTER — TELEPHONE (OUTPATIENT)
Dept: FAMILY MEDICINE CLINIC | Facility: CLINIC | Age: 63
End: 2023-01-17

## 2023-01-17 ENCOUNTER — TELEPHONE (OUTPATIENT)
Dept: INTERVENTIONAL RADIOLOGY/VASCULAR | Facility: HOSPITAL | Age: 63
End: 2023-01-17

## 2023-01-17 ENCOUNTER — PATIENT OUTREACH (OUTPATIENT)
Dept: CASE MANAGEMENT | Age: 63
End: 2023-01-17

## 2023-01-17 ENCOUNTER — TELEPHONE (OUTPATIENT)
Dept: PHYSICAL THERAPY | Facility: HOSPITAL | Age: 63
End: 2023-01-17

## 2023-01-17 NOTE — TELEPHONE ENCOUNTER
Lavonia Alpers returned call, informed her Dr. Kate Celeste will sign order for [facility] and she voiced understanding.

## 2023-01-17 NOTE — PROGRESS NOTES
Attempted to reach the patient to complete a [facility] FU call. Left a message, with the assistance of the language line, for the pt to call the NCM back at, [phone].

## 2023-01-17 NOTE — TELEPHONE ENCOUNTER
Yes  Prediabteic  Would defer to gen surg in regards to dressing change instructions -  has appt 1/30.  I have not seen pt since 11/22

## 2023-01-17 NOTE — TELEPHONE ENCOUNTER
Left detail message with Geeta Alberto RN confidential line [facility]    Provided the message below

## 2023-01-17 NOTE — PAT NURSING NOTE
Called and left a voicemail to call back. This is an attempt to schedule patient for a drain check as ordered by her physician.

## 2023-01-17 NOTE — TELEPHONE ENCOUNTER
Geeta Alberto RN from [facility] asking the following    1. Will  sign orders for home health care for patient    2. He needs to confirm patient's diagnosis. Is she prediabetic or diabetic? 3. There is information missing on who is managing the drain and dressing changes. Please advise if dressing around the drain should be drain gauze or 4x4 and how often dressing should be changed. Weekly?

## 2023-01-17 NOTE — PROGRESS NOTES
NCM placed TCM call to patient with the assistance of a : Lori Meckel # [id number], LM requesting a call back to [phone] with a condition update.

## 2023-01-18 ENCOUNTER — APPOINTMENT (OUTPATIENT)
Dept: PHYSICAL THERAPY | Age: 63
End: 2023-01-18
Attending: ORTHOPAEDIC SURGERY

## 2023-01-20 NOTE — PROGRESS NOTES
Called Language Line-on hold for almost 9 minutes---spoke with Senthil--reports Mauritian interpreters are not available at this time--to try back at another time. Spoke with daughter Andrew Grubbs (TYESHA verified, speaks English--no need for )--declines HFU with Dr. Ilene Siemens, \"Dr. Anatoliy Rivera has not been her doctor in a very long time--my mom sees someone else, I'm not sure who she sees, but I will have her make an appointment with that doctor. \" Relayed to daughter that pt saw Dr. Anatoliy Rivera in office 11/25/2022--dtr still thinks patient sees another [address] provider. Daughter will speak with patient and schedule HFU with different PCP (records show pt has seen Dr. Mk Hurtado in past). Daughter confirms [facility] has started, pt does have right UC PICC, getting Rocephin IV daily, drain in place. Daughter confirms upcoming tests/appts, as scheduled. Declines further assessment.     Future Appointments   Date Time Provider Callie Perez   1/24/2023  6:00 PM [address] COVID RESOURCE [facility] LAB EM [address]   1/27/2023 11:00 AM [address] [place] [address] RM4  [facility] [address]   1/30/2023  9:30 AM [facility]

## 2023-01-23 ENCOUNTER — LAB REQUISITION (OUTPATIENT)
Dept: LAB | Facility: HOSPITAL | Age: 63
End: 2023-01-23
Payer: MEDICARE

## 2023-01-23 ENCOUNTER — APPOINTMENT (OUTPATIENT)
Dept: PHYSICAL THERAPY | Age: 63
End: 2023-01-23
Attending: ORTHOPAEDIC SURGERY

## 2023-01-23 DIAGNOSIS — K57.20 DIVERTICULITIS OF LARGE INTESTINE WITH PERFORATION AND ABSCESS WITHOUT BLEEDING: ICD-10-CM

## 2023-01-23 DIAGNOSIS — M48.061 SPINAL STENOSIS OF LUMBAR REGION WITHOUT NEUROGENIC CLAUDICATION: ICD-10-CM

## 2023-01-23 DIAGNOSIS — M19.90 OSTEOARTHRITIS, UNSPECIFIED OSTEOARTHRITIS TYPE, UNSPECIFIED SITE: ICD-10-CM

## 2023-01-23 LAB
ALBUMIN SERPL-MCNC: 3.3 G/DL (ref 3.4–5)
ALBUMIN/GLOB SERPL: 0.9 {RATIO} (ref 1–2)
ALP LIVER SERPL-CCNC: 45 U/L
ALT SERPL-CCNC: 21 U/L
ANION GAP SERPL CALC-SCNC: 8 MMOL/L (ref 0–18)
AST SERPL-CCNC: 13 U/L (ref 15–37)
BASOPHILS # BLD AUTO: 0.08 X10(3) UL (ref 0–0.2)
BASOPHILS NFR BLD AUTO: 2.3 %
BILIRUB SERPL-MCNC: 0.2 MG/DL (ref 0.1–2)
BUN BLD-MCNC: 13 MG/DL (ref 7–18)
BUN/CREAT SERPL: 17.6 (ref 10–20)
CALCIUM BLD-MCNC: 8.9 MG/DL (ref 8.5–10.1)
CHLORIDE SERPL-SCNC: 105 MMOL/L (ref 98–112)
CO2 SERPL-SCNC: 27 MMOL/L (ref 21–32)
CREAT BLD-MCNC: 0.74 MG/DL
DEPRECATED RDW RBC AUTO: 43.9 FL (ref 35.1–46.3)
EOSINOPHIL # BLD AUTO: 0.14 X10(3) UL (ref 0–0.7)
EOSINOPHIL NFR BLD AUTO: 4 %
ERYTHROCYTE [DISTWIDTH] IN BLOOD BY AUTOMATED COUNT: 13 % (ref 11–15)
GFR SERPLBLD BASED ON 1.73 SQ M-ARVRAT: 91 ML/MIN/1.73M2 (ref 60–?)
GLOBULIN PLAS-MCNC: 3.5 G/DL (ref 2.8–4.4)
GLUCOSE BLD-MCNC: 192 MG/DL (ref 70–99)
HCT VFR BLD AUTO: 34.8 %
HGB BLD-MCNC: 11.4 G/DL
IMM GRANULOCYTES # BLD AUTO: 0.01 X10(3) UL (ref 0–1)
IMM GRANULOCYTES NFR BLD: 0.3 %
LYMPHOCYTES # BLD AUTO: 1.04 X10(3) UL (ref 1–4)
LYMPHOCYTES NFR BLD AUTO: 30.1 %
MCH RBC QN AUTO: 30.2 PG (ref 26–34)
MCHC RBC AUTO-ENTMCNC: 32.8 G/DL (ref 31–37)
MCV RBC AUTO: 92.1 FL
MONOCYTES # BLD AUTO: 0.33 X10(3) UL (ref 0.1–1)
MONOCYTES NFR BLD AUTO: 9.5 %
NEUTROPHILS # BLD AUTO: 1.86 X10 (3) UL (ref 1.5–7.7)
NEUTROPHILS # BLD AUTO: 1.86 X10(3) UL (ref 1.5–7.7)
NEUTROPHILS NFR BLD AUTO: 53.8 %
OSMOLALITY SERPL CALC.SUM OF ELEC: 295 MOSM/KG (ref 275–295)
PLATELET # BLD AUTO: 454 10(3)UL (ref 150–450)
POTASSIUM SERPL-SCNC: 3.9 MMOL/L (ref 3.5–5.1)
PROT SERPL-MCNC: 6.8 G/DL (ref 6.4–8.2)
RBC # BLD AUTO: 3.78 X10(6)UL
SODIUM SERPL-SCNC: 140 MMOL/L (ref 136–145)
WBC # BLD AUTO: 3.5 X10(3) UL (ref 4–11)

## 2023-01-23 PROCEDURE — 80053 COMPREHEN METABOLIC PANEL: CPT | Performed by: INTERNAL MEDICINE

## 2023-01-23 PROCEDURE — 85025 COMPLETE CBC W/AUTO DIFF WBC: CPT | Performed by: INTERNAL MEDICINE

## 2023-01-24 ENCOUNTER — LAB ENCOUNTER (OUTPATIENT)
Dept: LAB | Facility: HOSPITAL | Age: 63
End: 2023-01-24
Attending: RADIOLOGY
Payer: MEDICARE

## 2023-01-24 DIAGNOSIS — Z01.818 PRE-OP TESTING: ICD-10-CM

## 2023-01-24 RX ORDER — TRAMADOL HYDROCHLORIDE 50 MG/1
50 TABLET ORAL 2 TIMES DAILY PRN
Qty: 60 TABLET | Refills: 0 | Status: SHIPPED | OUTPATIENT
Start: 2023-01-24

## 2023-01-24 NOTE — TELEPHONE ENCOUNTER
Please review refill protocol failed/ no protocol  Requested Prescriptions   Pending Prescriptions Disp Refills    traMADol 50 MG Oral Tab 60 tablet 0     Sig: Take 1 tablet (50 mg total) by mouth 2 (two) times daily as needed.        There is no refill protocol information for this order

## 2023-01-25 ENCOUNTER — APPOINTMENT (OUTPATIENT)
Dept: PHYSICAL THERAPY | Age: 63
End: 2023-01-25
Attending: ORTHOPAEDIC SURGERY

## 2023-01-25 LAB — SARS-COV-2 RNA RESP QL NAA+PROBE: NOT DETECTED

## 2023-01-26 DIAGNOSIS — M54.50 CHRONIC LOW BACK PAIN WITHOUT SCIATICA, UNSPECIFIED BACK PAIN LATERALITY: ICD-10-CM

## 2023-01-26 DIAGNOSIS — G89.29 CHRONIC LOW BACK PAIN WITHOUT SCIATICA, UNSPECIFIED BACK PAIN LATERALITY: ICD-10-CM

## 2023-01-26 DIAGNOSIS — M19.90 OSTEOARTHRITIS, UNSPECIFIED OSTEOARTHRITIS TYPE, UNSPECIFIED SITE: ICD-10-CM

## 2023-01-26 RX ORDER — OMEPRAZOLE 40 MG/1
CAPSULE, DELAYED RELEASE ORAL
Qty: 90 CAPSULE | Refills: 1 | Status: SHIPPED | OUTPATIENT
Start: 2023-01-26

## 2023-01-26 RX ORDER — GABAPENTIN 300 MG/1
CAPSULE ORAL
Qty: 450 CAPSULE | Refills: 1 | Status: SHIPPED | OUTPATIENT
Start: 2023-01-26

## 2023-01-26 NOTE — TELEPHONE ENCOUNTER
Refill passed per [facility] protocol    Requested Prescriptions   Pending Prescriptions Disp Refills    Omeprazole 40 MG Oral Capsule Delayed Release 90 capsule 1     Sig: TOME CATHY LARSENA TODOS LOS REAL       Gastrointestional Medication Protocol Passed - 1/26/2023 12:21 PM        Passed - In person appointment or virtual visit in the past 12 mos or appointment in next 3 mos     Recent Outpatient Visits              2 months ago Post-menopausal    [facility], l[place] [place], Charmayne Gloss, MD    Office Visit    3 months ago Chronic left shoulder pain    [facility], [address], MAGI Camacho    Office Visit    9 months ago Encounter for annual health examination    [facility], Charmayne Gloss, MD    Office Visit    1 year ago Acute intractable tension-type headache    Denis Bearded, Charmayne Gloss, MD    Telemedicine    1 year ago Insomnia, unspecified type    Denis Bearded, Charmayne Gloss, MD    Office Visit          Future Appointments         Provider Department Appt Notes    Tomorrow Ethan Amos MD; [facility] IR Ghada Valentin [facility]. [address] [facility]; Ins      In 4 days Camillia Severs, MD [facility], [address] check drain                 gabapentin 300 MG Oral Cap 450 capsule 1     Sig: TAKE 2 CAPSULES BY MOUTH EVERY MORNING AND TAKE 3 CAPSULES BY MOUTH IN THE [address] [facility]       Neurology Medications Passed - 1/26/2023 12:21 PM        Passed - In person appointment or virtual visit in the past 6 mos or appointment in next 3 mos     Recent Outpatient Visits              2 months ago Post-menopausal    [address], [address], Charmayne Gloss, MD    Office Visit    3 months ago Chronic left shoulder pain    [address] [facility], [facility], [place] MAGI Riddle    Office Visit    9 months ago Encounter for annual health examination    John Maldonado, Letty Stack MD    Office Visit    1 year ago Acute intractable tension-type headache    Letty Ornelas MD    Telemedicine    1 year ago Insomnia, unspecified type    John Maldonado, Letty Stack MD    Office Visit          Future Appointments         Provider Department Appt Notes    Tomorrow Marcie Newby MD; [facility] IR [facility] Interventional Suites -[address]; Ins      In 4 days Danny Lu MD [address], [address] [facility] check drain

## 2023-01-26 NOTE — TELEPHONE ENCOUNTER
Tramadol sent 01/24/2023  Sertraline - patient should have enough through 02/25/2023 Yes, tell her to take 6 units twice a day instead, effectively cutting the dose in half.  Rukhsana Glance

## 2023-01-27 ENCOUNTER — HOSPITAL ENCOUNTER (OUTPATIENT)
Dept: INTERVENTIONAL RADIOLOGY/VASCULAR | Facility: HOSPITAL | Age: 63
Discharge: HOME | End: 2023-01-27
Attending: NURSE PRACTITIONER | Admitting: RADIOLOGY
Payer: MEDICARE

## 2023-01-27 VITALS
TEMPERATURE: 97 F | DIASTOLIC BLOOD PRESSURE: 78 MMHG | HEIGHT: 64 IN | HEART RATE: 72 BPM | OXYGEN SATURATION: 96 % | RESPIRATION RATE: 16 BRPM | SYSTOLIC BLOOD PRESSURE: 121 MMHG | BODY MASS INDEX: 21 KG/M2 | WEIGHT: 123 LBS

## 2023-01-27 DIAGNOSIS — Z01.818 PRE-OP TESTING: Primary | ICD-10-CM

## 2023-01-27 DIAGNOSIS — Z87.19 HISTORY OF DIVERTICULAR ABSCESS: ICD-10-CM

## 2023-01-27 PROCEDURE — BW111ZZ FLUOROSCOPY OF ABDOMEN AND PELVIS USING LOW OSMOLAR CONTRAST: ICD-10-PCS | Performed by: RADIOLOGY

## 2023-01-27 PROCEDURE — 76080 X-RAY EXAM OF FISTULA: CPT

## 2023-01-27 PROCEDURE — 49424 ASSESS CYST CONTRAST INJECT: CPT

## 2023-01-27 NOTE — INTERVAL H&P NOTE
The above referenced H&P was reviewed by Esther Summers MD on 1/27/2023, the patient was examined and no significant changes have occurred in the patient's condition since the H&P was performed. Risks, benefits, alternative treatments and consequences of no treatment were discussed. We will proceed with procedure as planned.       Esther Summers MD  1/27/2023  4:08 PM

## 2023-01-27 NOTE — IVS NOTE
DISCHARGE NOTE     Pt is able to sit up and ambulate without difficulty. Procedural site remains dry and intact with good circulation, motion, and sensation. No signs and symptoms of bleeding/hematoma noted. Pt denies any pain or discomfort at this time. Instruction provided, patient/family verbalizes understanding. Dr. Raisa Beasley spoke with patient/family post procedure.      Pt discharge to [address]     Follow up Appointment: the office will call patient to schedule follow-up in 2 weeks    New Prescription: n/a

## 2023-01-27 NOTE — DISCHARGE INSTRUCTIONS
[address]  [phone]     Patient Name:  [place]    Procedure:  Drain Check    Site Care: Do not remove the dressing over the catheter/tube. Please contact Interventional Radiology for concerns related to this dressing. Activity/Diet  No heavy lifting or strenuous activity for 48 hours. Drink plenty of fluids, unless you have otherwise been told to restrict your fluid intake. Do not drink alcohol for 24 hours. Do not drive,  operate heavy machinery, make important decisions or sign legal documents today. Medications:  Make no changes to your existing medications. Contact Interventional Radiology at [phone] if you have severe/unrelieved pain, fever, chills, dizziness/lightheadedness, or drainage/bleeding from your incision site.     *The office will call you to schedule your follow-up drain check

## 2023-01-30 ENCOUNTER — APPOINTMENT (OUTPATIENT)
Dept: PHYSICAL THERAPY | Age: 63
End: 2023-01-30
Attending: ORTHOPAEDIC SURGERY

## 2023-01-30 ENCOUNTER — OFFICE VISIT (OUTPATIENT)
Dept: SURGERY | Facility: CLINIC | Age: 63
End: 2023-01-30

## 2023-01-30 VITALS — HEIGHT: 64 IN | BODY MASS INDEX: 21 KG/M2 | WEIGHT: 123 LBS

## 2023-01-30 DIAGNOSIS — K57.30 DIVERTICULAR DISEASE OF LARGE INTESTINE WITH COMPLICATION: Primary | ICD-10-CM

## 2023-01-30 PROCEDURE — 99214 OFFICE O/P EST MOD 30 MIN: CPT | Performed by: SURGERY

## 2023-01-30 RX ORDER — AMOXICILLIN AND CLAVULANATE POTASSIUM 875; 125 MG/1; MG/1
1 TABLET, FILM COATED ORAL 2 TIMES DAILY
Qty: 28 TABLET | Refills: 0 | Status: SHIPPED | OUTPATIENT
Start: 2023-01-30

## 2023-01-31 ENCOUNTER — TELEPHONE (OUTPATIENT)
Dept: SURGERY | Facility: CLINIC | Age: 63
End: 2023-01-31

## 2023-01-31 NOTE — TELEPHONE ENCOUNTER
Pt's  called. Pt had an appointment yesterday 1-30-23. In the afternoon the drain color had changed from brown to yellow and draining a lot. Sometimes the sediments plug the drain. Temperature of 99.5. Today 1-31-23 same color but is draining less. No temperature.   Please call

## 2023-01-31 NOTE — TELEPHONE ENCOUNTER
Called and spoke to patient's , Alex Santos. Drainage yellow, increasing and has more sediment. [address] how to Ammon-Estella" the drain to insure patency. Please contact Dr. Rodolfo Mccray office to schedule drain check with AURA Fong verbalized understanding.

## 2023-02-01 ENCOUNTER — APPOINTMENT (OUTPATIENT)
Dept: PHYSICAL THERAPY | Age: 63
End: 2023-02-01
Attending: ORTHOPAEDIC SURGERY

## 2023-02-02 ENCOUNTER — TELEPHONE (OUTPATIENT)
Dept: FAMILY MEDICINE CLINIC | Facility: CLINIC | Age: 63
End: 2023-02-02

## 2023-02-02 NOTE — TELEPHONE ENCOUNTER
Spoke with [facility],   verified. He is req additional order for nursing visit possible 1 visit per week for 6 weeks then he will call back at the end of 6 weeks to recetify pt. Pt had PICC line that was removed, she only has BRUCE drain that he needs to monitor and pt GI condition and  in 3 weeks they will decide if pt will need surgery. pls advise on additional RN visit, thanks in advance.              Future Appointments   Date Time Provider Callie Perez   2023  9:30 AM [facility] COVID RESOURCE [facility] LAB  [place]   2/10/2023 10:30 AM [facility] IVS RM4  [address]

## 2023-02-03 NOTE — TELEPHONE ENCOUNTER
Dave called back to ask who left the voicemail. I gave her [address] name. He confirmed message was received.

## 2023-02-05 ENCOUNTER — HOSPITAL ENCOUNTER (INPATIENT)
Facility: HOSPITAL | Age: 63
LOS: 2 days | Discharge: HOME HEALTH | End: 2023-02-07
Attending: EMERGENCY MEDICINE | Admitting: HOSPITALIST
Payer: MEDICARE

## 2023-02-05 ENCOUNTER — APPOINTMENT (OUTPATIENT)
Dept: CT IMAGING | Facility: HOSPITAL | Age: 63
End: 2023-02-05
Attending: EMERGENCY MEDICINE
Payer: MEDICARE

## 2023-02-05 DIAGNOSIS — L03.311 ABDOMINAL WALL CELLULITIS: Primary | ICD-10-CM

## 2023-02-05 DIAGNOSIS — K57.80 PERFORATED DIVERTICULUM: ICD-10-CM

## 2023-02-05 LAB
ANION GAP SERPL CALC-SCNC: 5 MMOL/L (ref 0–18)
BASOPHILS # BLD AUTO: 0.06 X10(3) UL (ref 0–0.2)
BASOPHILS NFR BLD AUTO: 0.8 %
BUN BLD-MCNC: 12 MG/DL (ref 7–18)
BUN/CREAT SERPL: 18.2 (ref 10–20)
CALCIUM BLD-MCNC: 9.3 MG/DL (ref 8.5–10.1)
CHLORIDE SERPL-SCNC: 103 MMOL/L (ref 98–112)
CO2 SERPL-SCNC: 30 MMOL/L (ref 21–32)
CREAT BLD-MCNC: 0.66 MG/DL
DEPRECATED RDW RBC AUTO: 41.5 FL (ref 35.1–46.3)
EOSINOPHIL # BLD AUTO: 0.22 X10(3) UL (ref 0–0.7)
EOSINOPHIL NFR BLD AUTO: 3 %
ERYTHROCYTE [DISTWIDTH] IN BLOOD BY AUTOMATED COUNT: 12.6 % (ref 11–15)
GFR SERPLBLD BASED ON 1.73 SQ M-ARVRAT: 98 ML/MIN/1.73M2 (ref 60–?)
GLUCOSE BLD-MCNC: 114 MG/DL (ref 70–99)
HCT VFR BLD AUTO: 36.3 %
HGB BLD-MCNC: 12.2 G/DL
IMM GRANULOCYTES # BLD AUTO: 0.05 X10(3) UL (ref 0–1)
IMM GRANULOCYTES NFR BLD: 0.7 %
LYMPHOCYTES # BLD AUTO: 1.49 X10(3) UL (ref 1–4)
LYMPHOCYTES NFR BLD AUTO: 20.5 %
MCH RBC QN AUTO: 30 PG (ref 26–34)
MCHC RBC AUTO-ENTMCNC: 33.6 G/DL (ref 31–37)
MCV RBC AUTO: 89.4 FL
MONOCYTES # BLD AUTO: 0.82 X10(3) UL (ref 0.1–1)
MONOCYTES NFR BLD AUTO: 11.3 %
NEUTROPHILS # BLD AUTO: 4.62 X10 (3) UL (ref 1.5–7.7)
NEUTROPHILS # BLD AUTO: 4.62 X10(3) UL (ref 1.5–7.7)
NEUTROPHILS NFR BLD AUTO: 63.7 %
OSMOLALITY SERPL CALC.SUM OF ELEC: 287 MOSM/KG (ref 275–295)
PLATELET # BLD AUTO: 327 10(3)UL (ref 150–450)
POTASSIUM SERPL-SCNC: 4 MMOL/L (ref 3.5–5.1)
RBC # BLD AUTO: 4.06 X10(6)UL
SARS-COV-2 RNA RESP QL NAA+PROBE: NOT DETECTED
SODIUM SERPL-SCNC: 138 MMOL/L (ref 136–145)
WBC # BLD AUTO: 7.3 X10(3) UL (ref 4–11)

## 2023-02-05 PROCEDURE — 99223 1ST HOSP IP/OBS HIGH 75: CPT | Performed by: HOSPITALIST

## 2023-02-05 PROCEDURE — 74177 CT ABD & PELVIS W/CONTRAST: CPT | Performed by: EMERGENCY MEDICINE

## 2023-02-05 RX ORDER — HYDROCODONE BITARTRATE AND ACETAMINOPHEN 5; 325 MG/1; MG/1
2 TABLET ORAL EVERY 4 HOURS PRN
Status: DISCONTINUED | OUTPATIENT
Start: 2023-02-05 | End: 2023-02-07

## 2023-02-05 RX ORDER — POLYETHYLENE GLYCOL 3350 17 G/17G
17 POWDER, FOR SOLUTION ORAL DAILY PRN
Status: DISCONTINUED | OUTPATIENT
Start: 2023-02-05 | End: 2023-02-07

## 2023-02-05 RX ORDER — GABAPENTIN 300 MG/1
900 CAPSULE ORAL NIGHTLY
Status: DISCONTINUED | OUTPATIENT
Start: 2023-02-05 | End: 2023-02-07

## 2023-02-05 RX ORDER — HEPARIN SODIUM 5000 [USP'U]/ML
5000 INJECTION, SOLUTION INTRAVENOUS; SUBCUTANEOUS EVERY 12 HOURS SCHEDULED
Status: COMPLETED | OUTPATIENT
Start: 2023-02-05 | End: 2023-02-05

## 2023-02-05 RX ORDER — MORPHINE SULFATE 4 MG/ML
4 INJECTION, SOLUTION INTRAMUSCULAR; INTRAVENOUS EVERY 2 HOUR PRN
Status: DISCONTINUED | OUTPATIENT
Start: 2023-02-05 | End: 2023-02-07

## 2023-02-05 RX ORDER — LOSARTAN POTASSIUM 50 MG/1
50 TABLET ORAL DAILY
Status: DISCONTINUED | OUTPATIENT
Start: 2023-02-06 | End: 2023-02-07

## 2023-02-05 RX ORDER — MORPHINE SULFATE 2 MG/ML
2 INJECTION, SOLUTION INTRAMUSCULAR; INTRAVENOUS EVERY 2 HOUR PRN
Status: DISCONTINUED | OUTPATIENT
Start: 2023-02-05 | End: 2023-02-07

## 2023-02-05 RX ORDER — MORPHINE SULFATE 2 MG/ML
1 INJECTION, SOLUTION INTRAMUSCULAR; INTRAVENOUS EVERY 2 HOUR PRN
Status: DISCONTINUED | OUTPATIENT
Start: 2023-02-05 | End: 2023-02-07

## 2023-02-05 RX ORDER — SENNOSIDES 8.6 MG
17.2 TABLET ORAL NIGHTLY PRN
Status: DISCONTINUED | OUTPATIENT
Start: 2023-02-05 | End: 2023-02-07

## 2023-02-05 RX ORDER — SERTRALINE HYDROCHLORIDE 25 MG/1
25 TABLET, FILM COATED ORAL DAILY
Status: DISCONTINUED | OUTPATIENT
Start: 2023-02-06 | End: 2023-02-07

## 2023-02-05 RX ORDER — MORPHINE SULFATE 4 MG/ML
4 INJECTION, SOLUTION INTRAMUSCULAR; INTRAVENOUS ONCE
Status: COMPLETED | OUTPATIENT
Start: 2023-02-05 | End: 2023-02-05

## 2023-02-05 RX ORDER — GABAPENTIN 300 MG/1
600 CAPSULE ORAL DAILY
Status: DISCONTINUED | OUTPATIENT
Start: 2023-02-06 | End: 2023-02-07

## 2023-02-05 RX ORDER — ZOLPIDEM TARTRATE 5 MG/1
10 TABLET ORAL NIGHTLY PRN
Status: DISCONTINUED | OUTPATIENT
Start: 2023-02-05 | End: 2023-02-07

## 2023-02-05 RX ORDER — PANTOPRAZOLE SODIUM 40 MG/1
40 TABLET, DELAYED RELEASE ORAL
Status: DISCONTINUED | OUTPATIENT
Start: 2023-02-06 | End: 2023-02-07

## 2023-02-05 RX ORDER — ACETAMINOPHEN 325 MG/1
650 TABLET ORAL EVERY 6 HOURS PRN
Status: DISCONTINUED | OUTPATIENT
Start: 2023-02-05 | End: 2023-02-07

## 2023-02-05 RX ORDER — SODIUM PHOSPHATE, DIBASIC AND SODIUM PHOSPHATE, MONOBASIC 7; 19 G/133ML; G/133ML
1 ENEMA RECTAL ONCE AS NEEDED
Status: DISCONTINUED | OUTPATIENT
Start: 2023-02-05 | End: 2023-02-07

## 2023-02-05 RX ORDER — ACETAMINOPHEN 325 MG/1
650 TABLET ORAL EVERY 4 HOURS PRN
Status: DISCONTINUED | OUTPATIENT
Start: 2023-02-05 | End: 2023-02-07

## 2023-02-05 RX ORDER — HYDROCODONE BITARTRATE AND ACETAMINOPHEN 5; 325 MG/1; MG/1
1 TABLET ORAL EVERY 4 HOURS PRN
Status: DISCONTINUED | OUTPATIENT
Start: 2023-02-05 | End: 2023-02-07

## 2023-02-05 RX ORDER — MORPHINE SULFATE 2 MG/ML
2 INJECTION, SOLUTION INTRAMUSCULAR; INTRAVENOUS ONCE
Status: COMPLETED | OUTPATIENT
Start: 2023-02-05 | End: 2023-02-05

## 2023-02-05 RX ORDER — ONDANSETRON 2 MG/ML
4 INJECTION INTRAMUSCULAR; INTRAVENOUS EVERY 6 HOURS PRN
Status: DISCONTINUED | OUTPATIENT
Start: 2023-02-05 | End: 2023-02-07

## 2023-02-05 RX ORDER — HEPARIN SODIUM 5000 [USP'U]/ML
5000 INJECTION, SOLUTION INTRAVENOUS; SUBCUTANEOUS EVERY 12 HOURS SCHEDULED
Status: DISCONTINUED | OUTPATIENT
Start: 2023-02-05 | End: 2023-02-05

## 2023-02-05 NOTE — ED INITIAL ASSESSMENT (HPI)
[place] arrived through triage for c/o pain to drainage site, L lower abdomen. BRUCE drain was placed on 1/27 for treatment of diverticular abscess.

## 2023-02-05 NOTE — ED QUICK NOTES
Orders for admission, patient is aware of plan and ready to go upstairs. Any questions, please call ED RN Marsha Esparza at extension [phone].      Patient Covid vaccination status: Fully vaccinated     COVID Test Ordered in ED: Rapid SARS-CoV-2 by PCR    COVID Suspicion at Admission: N/A    Running Infusions:  None    Mental Status/LOC at time of transport: A&Ox4    Other pertinent information:   English speaking

## 2023-02-06 ENCOUNTER — APPOINTMENT (OUTPATIENT)
Dept: INTERVENTIONAL RADIOLOGY/VASCULAR | Facility: HOSPITAL | Age: 63
End: 2023-02-06
Attending: CLINICAL NURSE SPECIALIST
Payer: MEDICARE

## 2023-02-06 LAB
ANION GAP SERPL CALC-SCNC: 0 MMOL/L (ref 0–18)
BASOPHILS # BLD AUTO: 0.06 X10(3) UL (ref 0–0.2)
BASOPHILS NFR BLD AUTO: 1.1 %
BUN BLD-MCNC: 9 MG/DL (ref 7–18)
BUN/CREAT SERPL: 14.1 (ref 10–20)
CALCIUM BLD-MCNC: 8.4 MG/DL (ref 8.5–10.1)
CHLORIDE SERPL-SCNC: 109 MMOL/L (ref 98–112)
CO2 SERPL-SCNC: 28 MMOL/L (ref 21–32)
CREAT BLD-MCNC: 0.64 MG/DL
DEPRECATED RDW RBC AUTO: 41.9 FL (ref 35.1–46.3)
EOSINOPHIL # BLD AUTO: 0.34 X10(3) UL (ref 0–0.7)
EOSINOPHIL NFR BLD AUTO: 6.5 %
ERYTHROCYTE [DISTWIDTH] IN BLOOD BY AUTOMATED COUNT: 12.7 % (ref 11–15)
GFR SERPLBLD BASED ON 1.73 SQ M-ARVRAT: 99 ML/MIN/1.73M2 (ref 60–?)
GLUCOSE BLD-MCNC: 96 MG/DL (ref 70–99)
HCT VFR BLD AUTO: 31.1 %
HGB BLD-MCNC: 10.3 G/DL
IMM GRANULOCYTES # BLD AUTO: 0.04 X10(3) UL (ref 0–1)
IMM GRANULOCYTES NFR BLD: 0.8 %
LYMPHOCYTES # BLD AUTO: 1.72 X10(3) UL (ref 1–4)
LYMPHOCYTES NFR BLD AUTO: 32.6 %
MCH RBC QN AUTO: 29.7 PG (ref 26–34)
MCHC RBC AUTO-ENTMCNC: 33.1 G/DL (ref 31–37)
MCV RBC AUTO: 89.6 FL
MONOCYTES # BLD AUTO: 0.64 X10(3) UL (ref 0.1–1)
MONOCYTES NFR BLD AUTO: 12.1 %
NEUTROPHILS # BLD AUTO: 2.47 X10 (3) UL (ref 1.5–7.7)
NEUTROPHILS # BLD AUTO: 2.47 X10(3) UL (ref 1.5–7.7)
NEUTROPHILS NFR BLD AUTO: 46.9 %
OSMOLALITY SERPL CALC.SUM OF ELEC: 283 MOSM/KG (ref 275–295)
PLATELET # BLD AUTO: 281 10(3)UL (ref 150–450)
POTASSIUM SERPL-SCNC: 4.1 MMOL/L (ref 3.5–5.1)
RBC # BLD AUTO: 3.47 X10(6)UL
SODIUM SERPL-SCNC: 137 MMOL/L (ref 136–145)
WBC # BLD AUTO: 5.3 X10(3) UL (ref 4–11)

## 2023-02-06 PROCEDURE — 99233 SBSQ HOSP IP/OBS HIGH 50: CPT | Performed by: HOSPITALIST

## 2023-02-06 PROCEDURE — 0W2JX0Z CHANGE DRAINAGE DEVICE IN PELVIC CAVITY, EXTERNAL APPROACH: ICD-10-PCS | Performed by: RADIOLOGY

## 2023-02-06 RX ORDER — LIDOCAINE HYDROCHLORIDE 20 MG/ML
INJECTION, SOLUTION EPIDURAL; INFILTRATION; INTRACAUDAL; PERINEURAL
Status: COMPLETED
Start: 2023-02-06 | End: 2023-02-06

## 2023-02-06 NOTE — PLAN OF CARE
Kale Duffy is hospital day #1- admitted via ED. Was recently here at hospital and had drain placed for abscess. She completed IV antibx outpatient and had PICC line removed Tuesday and started oral flagyl. Today present with pain LLQ, erythema and swelling to area- Drain in place w biopatch and tegaderm. Was medicated w morphine in ED- pain currently 0/10. ID and IR on consult and will most likely see patient tomorrow. Will be on IV vancomycin and Zosyn per orders. Problem: PAIN - ADULT  Goal: Verbalizes/displays adequate comfort level or patient's stated pain goal  Description: INTERVENTIONS:  - Encourage pt to monitor pain and request assistance  - Assess pain using appropriate pain scale  - Administer analgesics based on type and severity of pain and evaluate response  - Implement non-pharmacological measures as appropriate and evaluate response  - Consider cultural and social influences on pain and pain management  - Manage/alleviate anxiety  - Utilize distraction and/or relaxation techniques  - Monitor for opioid side effects  - Notify MD/LIP if interventions unsuccessful or patient reports new pain  - Anticipate increased pain with activity and pre-medicate as appropriate  Outcome: Progressing     Problem: RISK FOR INFECTION - ADULT  Goal: Absence of fever/infection during anticipated neutropenic period  Description: INTERVENTIONS  - Monitor WBC  - Administer growth factors as ordered  - Implement neutropenic guidelines  Outcome: Progressing     Problem: SAFETY ADULT - FALL  Goal: Free from fall injury  Description: INTERVENTIONS:  - Assess pt frequently for physical needs  - Identify cognitive and physical deficits and behaviors that affect risk of falls.   - [place] fall precautions as indicated by assessment.  - Educate pt/family on patient safety including physical limitations  - Instruct pt to call for assistance with activity based on assessment  - Modify environment to reduce risk of injury  - Provide assistive devices as appropriate  - Consider OT/PT consult to assist with strengthening/mobility  - Encourage toileting schedule  Outcome: Progressing     Problem: DISCHARGE PLANNING  Goal: Discharge to home or other facility with appropriate resources  Description: INTERVENTIONS:  - Identify barriers to discharge w/pt and caregiver  - Include patient/family/discharge partner in discharge planning  - Arrange for needed discharge resources and transportation as appropriate  - Identify discharge learning needs (meds, wound care, etc)  - Arrange for interpreters to assist at discharge as needed  - Consider post-discharge preferences of patient/family/discharge partner  - Complete POLST form as appropriate  - Assess patient's ability to be responsible for managing their own health  - Refer to Case Management Department for coordinating discharge planning if the patient needs post-hospital services based on physician/LIP order or complex needs related to functional status, cognitive ability or social support system  Outcome: Progressing

## 2023-02-06 NOTE — BRIEF PROCEDURE NOTE
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  Drainage catheter exchanged and pulled back slightly away from fistula to attempt closure    Specimens:0    Blood Loss:  0     Complications:  None    Drains: 8F      Pancho Aguilera MD  2023

## 2023-02-06 NOTE — PLAN OF CARE
Pt is alert and oriented x4 on room air. BRUCE drain from previous hospital stay in place and draining cloudy, green drainage. Reports LLQ pain; area has erythema and swelling. Norco PRN given for pain. IV vanco and zosyn given as ordered. SCDs and heparin for DVT prophylaxis. Up standby assist. Call light within reach. Problem: Patient Centered Care  Goal: Patient preferences are identified and integrated in the patient's plan of care  Description: Interventions:  - What would you like us to know as we care for you?  Pt reports pain mainly in LLQ  - Provide timely, complete, and accurate information to patient/family  - Incorporate patient and family knowledge, values, beliefs, and cultural backgrounds into the planning and delivery of care  - Encourage patient/family to participate in care and decision-making at the level they choose  - Honor patient and family perspectives and choices  Outcome: Progressing     Problem: Patient/Family Goals  Goal: Patient/Family Long Term Goal  Description: Patient's Long Term Goal: Recover from abdominal cellulitus  Interventions:  - antibiotics as ordered  - monitor site for increased swelling or redness  - See additional Care Plan goals for specific interventions  Outcome: Progressing  Goal: Patient/Family Short Term Goal  Description: Patient's Short Term Goal: Control pain    Interventions:   - Pain medication PRN  - Non-pharmacologic methods such as ice packs as needed  - Activity as tolerated  - See additional Care Plan goals for specific interventions  Outcome: Progressing     Problem: PAIN - ADULT  Goal: Verbalizes/displays adequate comfort level or patient's stated pain goal  Description: INTERVENTIONS:  - Encourage pt to monitor pain and request assistance  - Assess pain using appropriate pain scale  - Administer analgesics based on type and severity of pain and evaluate response  - Implement non-pharmacological measures as appropriate and evaluate response  - Consider cultural and social influences on pain and pain management  - Manage/alleviate anxiety  - Utilize distraction and/or relaxation techniques  - Monitor for opioid side effects  - Notify MD/LIP if interventions unsuccessful or patient reports new pain  - Anticipate increased pain with activity and pre-medicate as appropriate  Outcome: Progressing     Problem: RISK FOR INFECTION - ADULT  Goal: Absence of fever/infection during anticipated neutropenic period  Description: INTERVENTIONS  - Monitor WBC  - Administer growth factors as ordered  - Implement neutropenic guidelines  Outcome: Progressing     Problem: SAFETY ADULT - FALL  Goal: Free from fall injury  Description: INTERVENTIONS:  - Assess pt frequently for physical needs  - Identify cognitive and physical deficits and behaviors that affect risk of falls.   - [place] fall precautions as indicated by assessment.  - Educate pt/family on patient safety including physical limitations  - Instruct pt to call for assistance with activity based on assessment  - Modify environment to reduce risk of injury  - Provide assistive devices as appropriate  - Consider OT/PT consult to assist with strengthening/mobility  - Encourage toileting schedule  Outcome: Progressing     Problem: DISCHARGE PLANNING  Goal: Discharge to home or other facility with appropriate resources  Description: INTERVENTIONS:  - Identify barriers to discharge w/pt and caregiver  - Include patient/family/discharge partner in discharge planning  - Arrange for needed discharge resources and transportation as appropriate  - Identify discharge learning needs (meds, wound care, etc)  - Arrange for interpreters to assist at discharge as needed  - Consider post-discharge preferences of patient/family/discharge partner  - Complete POLST form as appropriate  - Assess patient's ability to be responsible for managing their own health  - Refer to Case Management Department for coordinating discharge planning if the patient needs post-hospital services based on physician/LIP order or complex needs related to functional status, cognitive ability or social support system  Outcome: Progressing

## 2023-02-07 VITALS
SYSTOLIC BLOOD PRESSURE: 110 MMHG | HEIGHT: 59 IN | TEMPERATURE: 98 F | BODY MASS INDEX: 24.77 KG/M2 | RESPIRATION RATE: 18 BRPM | DIASTOLIC BLOOD PRESSURE: 60 MMHG | WEIGHT: 122.88 LBS | OXYGEN SATURATION: 99 % | HEART RATE: 67 BPM

## 2023-02-07 PROCEDURE — 99239 HOSP IP/OBS DSCHRG MGMT >30: CPT | Performed by: HOSPITALIST

## 2023-02-07 NOTE — PLAN OF CARE
Pt alert and oriented x4 on room air. New BRUCE drain in place and draining. Norco PRN given for pain. IV zosyn infused as ordered. SCDs and heparin for DVT prophylaxis. Up by self. Call light within reach.     Problem: Patient Centered Care  Goal: Patient preferences are identified and integrated in the patient's plan of care  Description: Interventions:  - What would you like us to know as we care for you?   - Provide timely, complete, and accurate information to patient/family  - Incorporate patient and family knowledge, values, beliefs, and cultural backgrounds into the planning and delivery of care  - Encourage patient/family to participate in care and decision-making at the level they choose  - Honor patient and family perspectives and choices  Outcome: Progressing     Problem: Patient/Family Goals  Goal: Patient/Family Long Term Goal  Description: Patient's Long Term Goal: Recover from abdominal cellulitus  Interventions:  - Antibiotics as ordered  - BRUCE drain re-eval in 1 week    - See additional Care Plan goals for specific interventions  Outcome: Progressing  Goal: Patient/Family Short Term Goal  Description: Patient's Short Term Goal: Control pain    Interventions:   - Pain medication PRN  - Non-pharmacologic methods such as ice packs as needed  - Activity as tolerated  - See additional Care Plan goals for specific interventions  Outcome: Progressing     Problem: PAIN - ADULT  Goal: Verbalizes/displays adequate comfort level or patient's stated pain goal  Description: INTERVENTIONS:  - Encourage pt to monitor pain and request assistance  - Assess pain using appropriate pain scale  - Administer analgesics based on type and severity of pain and evaluate response  - Implement non-pharmacological measures as appropriate and evaluate response  - Consider cultural and social influences on pain and pain management  - Manage/alleviate anxiety  - Utilize distraction and/or relaxation techniques  - Monitor for opioid side effects  - Notify MD/LIP if interventions unsuccessful or patient reports new pain  - Anticipate increased pain with activity and pre-medicate as appropriate  Outcome: Progressing     Problem: RISK FOR INFECTION - ADULT  Goal: Absence of fever/infection during anticipated neutropenic period  Description: INTERVENTIONS  - Monitor WBC  - Administer growth factors as ordered  - Implement neutropenic guidelines  Outcome: Progressing     Problem: SAFETY ADULT - FALL  Goal: Free from fall injury  Description: INTERVENTIONS:  - Assess pt frequently for physical needs  - Identify cognitive and physical deficits and behaviors that affect risk of falls.   - [place] fall precautions as indicated by assessment.  - Educate pt/family on patient safety including physical limitations  - Instruct pt to call for assistance with activity based on assessment  - Modify environment to reduce risk of injury  - Provide assistive devices as appropriate  - Consider OT/PT consult to assist with strengthening/mobility  - Encourage toileting schedule  Outcome: Progressing     Problem: DISCHARGE PLANNING  Goal: Discharge to home or other facility with appropriate resources  Description: INTERVENTIONS:  - Identify barriers to discharge w/pt and caregiver  - Include patient/family/discharge partner in discharge planning  - Arrange for needed discharge resources and transportation as appropriate  - Identify discharge learning needs (meds, wound care, etc)  - Arrange for interpreters to assist at discharge as needed  - Consider post-discharge preferences of patient/family/discharge partner  - Complete POLST form as appropriate  - Assess patient's ability to be responsible for managing their own health  - Refer to Case Management Department for coordinating discharge planning if the patient needs post-hospital services based on physician/LIP order or complex needs related to functional status, cognitive ability or social support system  Outcome: Progressing

## 2023-02-07 NOTE — PLAN OF CARE
Pt A&O x4. RA. SBA. Pain medication as needed. Clear to be DC home with oral antibiotics by hospitalist and ID. AVS provided. Patient has remained free from falls throughout the day. Hourly rounding maintained. Pt's bed in lowest position w/ side rails up. Patient has been educated and is compliant with radha light system. The patient has been frequently assessed  and evaluated pursuant to at risk medications. Pt's room tidy and clear from potential fall risk.     Problem: Patient Centered Care  Goal: Patient preferences are identified and integrated in the patient's plan of care  Description: Interventions:  - What would you like us to know as we care for you?   - Provide timely, complete, and accurate information to patient/family  - Incorporate patient and family knowledge, values, beliefs, and cultural backgrounds into the planning and delivery of care  - Encourage patient/family to participate in care and decision-making at the level they choose  - Honor patient and family perspectives and choices  Outcome: Adequate for Discharge     Problem: Patient/Family Goals  Goal: Patient/Family Long Term Goal  Description: Patient's Long Term Goal:     Interventions:  -   - See additional Care Plan goals for specific interventions  Outcome: Adequate for Discharge  Goal: Patient/Family Short Term Goal  Description: Patient's Short Term Goal:     Interventions:   - See additional Care Plan goals for specific interventions  Outcome: Adequate for Discharge     Problem: PAIN - ADULT  Goal: Verbalizes/displays adequate comfort level or patient's stated pain goal  Description: INTERVENTIONS:  - Encourage pt to monitor pain and request assistance  - Assess pain using appropriate pain scale  - Administer analgesics based on type and severity of pain and evaluate response  - Implement non-pharmacological measures as appropriate and evaluate response  - Consider cultural and social influences on pain and pain management  - Manage/alleviate anxiety  - Utilize distraction and/or relaxation techniques  - Monitor for opioid side effects  - Notify MD/LIP if interventions unsuccessful or patient reports new pain  - Anticipate increased pain with activity and pre-medicate as appropriate  Outcome: Adequate for Discharge     Problem: RISK FOR INFECTION - ADULT  Goal: Absence of fever/infection during anticipated neutropenic period  Description: INTERVENTIONS  - Monitor WBC  - Administer growth factors as ordered  - Implement neutropenic guidelines  Outcome: Adequate for Discharge     Problem: SAFETY ADULT - FALL  Goal: Free from fall injury  Description: INTERVENTIONS:  - Assess pt frequently for physical needs  - Identify cognitive and physical deficits and behaviors that affect risk of falls.   - [place] fall precautions as indicated by assessment.  - Educate pt/family on patient safety including physical limitations  - Instruct pt to call for assistance with activity based on assessment  - Modify environment to reduce risk of injury  - Provide assistive devices as appropriate  - Consider OT/PT consult to assist with strengthening/mobility  - Encourage toileting schedule  Outcome: Adequate for Discharge

## 2023-02-07 NOTE — PLAN OF CARE
Pt alert. Had IR procedure today, drain replaced to keep fistula open and drain. Draining well. Pain managed with norco, tolerating well. Walking around ad sravanthi. Tolerating gen diet. Will come back for another IR catheter replacement for eventual fistula closing. ID antibiotics continued. Discharge home when cleared.      Problem: Patient Centered Care  Goal: Patient preferences are identified and integrated in the patient's plan of care  Description: Interventions:  - Provide timely, complete, and accurate information to patient/family  - Incorporate patient and family knowledge, values, beliefs, and cultural backgrounds into the planning and delivery of care  - Encourage patient/family to participate in care and decision-making at the level they choose  - Honor patient and family perspectives and choices  Outcome: Progressing     Problem: Patient/Family Goals  Goal: Patient/Family Long Term Goal    Interventions  - See additional Care Plan goals for specific interventions  Outcome: Progressing  Goal: Patient/Family Short Term Goal    Interventions:   - See additional Care Plan goals for specific interventions  Outcome: Progressing     Problem: PAIN - ADULT  Goal: Verbalizes/displays adequate comfort level or patient's stated pain goal  Description: INTERVENTIONS:  - Encourage pt to monitor pain and request assistance  - Assess pain using appropriate pain scale  - Administer analgesics based on type and severity of pain and evaluate response  - Implement non-pharmacological measures as appropriate and evaluate response  - Consider cultural and social influences on pain and pain management  - Manage/alleviate anxiety  - Utilize distraction and/or relaxation techniques  - Monitor for opioid side effects  - Notify MD/LIP if interventions unsuccessful or patient reports new pain  - Anticipate increased pain with activity and pre-medicate as appropriate  Outcome: Progressing     Problem: RISK FOR INFECTION - ADULT  Goal: Absence of fever/infection during anticipated neutropenic period  Description: INTERVENTIONS  - Monitor WBC  - Administer growth factors as ordered  - Implement neutropenic guidelines  Outcome: Progressing     Problem: SAFETY ADULT - FALL  Goal: Free from fall injury  Description: INTERVENTIONS:  - Assess pt frequently for physical needs  - Identify cognitive and physical deficits and behaviors that affect risk of falls.   - [place] fall precautions as indicated by assessment.  - Educate pt/family on patient safety including physical limitations  - Instruct pt to call for assistance with activity based on assessment  - Modify environment to reduce risk of injury  - Provide assistive devices as appropriate  - Consider OT/PT consult to assist with strengthening/mobility  - Encourage toileting schedule  Outcome: Progressing     Problem: DISCHARGE PLANNING  Goal: Discharge to home or other facility with appropriate resources  Description: INTERVENTIONS:  - Identify barriers to discharge w/pt and caregiver  - Include patient/family/discharge partner in discharge planning  - Arrange for needed discharge resources and transportation as appropriate  - Identify discharge learning needs (meds, wound care, etc)  - Arrange for interpreters to assist at discharge as needed  - Consider post-discharge preferences of patient/family/discharge partner  - Complete POLST form as appropriate  - Assess patient's ability to be responsible for managing their own health  - Refer to Case Management Department for coordinating discharge planning if the patient needs post-hospital services based on physician/LIP order or complex needs related to functional status, cognitive ability or social support system  Outcome: Progressing

## 2023-02-07 NOTE — CM/SW NOTE
SW received message from [facility] stating pt is current with their services. JESSIE orders entered. [facility] will follow up with the pt in the community     PLAN: home with [address], LSW, MSW ext.  [phone]

## 2023-02-08 ENCOUNTER — TELEPHONE (OUTPATIENT)
Dept: FAMILY MEDICINE CLINIC | Facility: CLINIC | Age: 63
End: 2023-02-08

## 2023-02-08 ENCOUNTER — PATIENT OUTREACH (OUTPATIENT)
Dept: CASE MANAGEMENT | Age: 63
End: 2023-02-08

## 2023-02-08 DIAGNOSIS — Z02.9 ENCOUNTERS FOR UNSPECIFIED ADMINISTRATIVE PURPOSE: ICD-10-CM

## 2023-02-08 DIAGNOSIS — L03.311 ABDOMINAL WALL CELLULITIS: ICD-10-CM

## 2023-02-08 PROCEDURE — 1111F DSCHRG MED/CURRENT MED MERGE: CPT

## 2023-02-08 NOTE — TELEPHONE ENCOUNTER
[facility] #[id number], [place]    Sent as FYI/TCM protocol:    Spoke to pt for TCM today. Patient confirms [facility] RN, Ginette Fong, saw her today to resume care. Patient confirms 2/10/2023 Covid test for 2/13/2023 IR fistulogram, as scheduled. Patient will call for f/u appt with Dr. Jose Og TCM/HFU with Dr. Cesar reardon at this time. She prefers to complete testing and f/u with Dr. Luh Astudillo and Dr. Delgado Hurtado only and will f/u with Dr. Pancho Ervin, at a later date- TCM/HFU appt recommended by 2/21/2023, as pt is a moderate risk for readmission. Please discuss with PCP and  f/u with pt, accordingly. If no TCM/HFU appt required, no need to contact pt. Thank you! Future Appointments   Date Time Provider Callie Ana   2/10/2023  9:45 AM ADO COVID RESOURCE ADO LAB EM Jens   2/13/2023 12:30  [facility] RM4 IR [address] [place] [address] [address] [place]       Follow up appointments: Follow up With Specialties Details Why Contact Info   Mio Treadwell MD INTERVENTIONAL, RADIOLOGY, Radiology Schedule an appointment as soon as possible for a visit For fistulagram next week. Yasmeen Godinez MD SURGERY, GENERAL Follow up in 1 week(s)  [address]   [phone]

## 2023-02-09 RX ORDER — METRONIDAZOLE 500 MG/1
500 TABLET ORAL 2 TIMES DAILY
Status: ON HOLD | COMMUNITY
End: 2023-02-13

## 2023-02-10 ENCOUNTER — LAB ENCOUNTER (OUTPATIENT)
Dept: LAB | Age: 63
End: 2023-02-10
Attending: RADIOLOGY
Payer: MEDICARE

## 2023-02-10 ENCOUNTER — HOSPITAL ENCOUNTER (OUTPATIENT)
Dept: INTERVENTIONAL RADIOLOGY/VASCULAR | Facility: HOSPITAL | Age: 63
Discharge: HOME | End: 2023-02-10
Attending: RADIOLOGY
Payer: MEDICARE

## 2023-02-10 DIAGNOSIS — Z01.818 PRE-OP TESTING: Primary | ICD-10-CM

## 2023-02-10 DIAGNOSIS — Z01.818 PRE-OP TESTING: ICD-10-CM

## 2023-02-10 LAB — SARS-COV-2 RNA RESP QL NAA+PROBE: NOT DETECTED

## 2023-02-13 ENCOUNTER — HOSPITAL ENCOUNTER (OUTPATIENT)
Dept: INTERVENTIONAL RADIOLOGY/VASCULAR | Facility: HOSPITAL | Age: 63
Discharge: HOME | End: 2023-02-13
Attending: NURSE PRACTITIONER | Admitting: RADIOLOGY
Payer: MEDICARE

## 2023-02-13 VITALS
RESPIRATION RATE: 17 BRPM | BODY MASS INDEX: 24.6 KG/M2 | DIASTOLIC BLOOD PRESSURE: 92 MMHG | HEIGHT: 59 IN | HEART RATE: 60 BPM | OXYGEN SATURATION: 99 % | TEMPERATURE: 97 F | SYSTOLIC BLOOD PRESSURE: 140 MMHG | WEIGHT: 122 LBS

## 2023-02-13 DIAGNOSIS — Z87.19 HISTORY OF DIVERTICULAR ABSCESS: ICD-10-CM

## 2023-02-13 PROCEDURE — 49424 ASSESS CYST CONTRAST INJECT: CPT

## 2023-02-13 PROCEDURE — 76080 X-RAY EXAM OF FISTULA: CPT

## 2023-02-13 PROCEDURE — BW111ZZ FLUOROSCOPY OF ABDOMEN AND PELVIS USING LOW OSMOLAR CONTRAST: ICD-10-PCS | Performed by: RADIOLOGY

## 2023-02-13 RX ORDER — LIDOCAINE HYDROCHLORIDE 20 MG/ML
INJECTION, SOLUTION EPIDURAL; INFILTRATION; INTRACAUDAL; PERINEURAL
Status: COMPLETED
Start: 2023-02-13 | End: 2023-02-13

## 2023-02-13 NOTE — IVS NOTE
DISCHARGE NOTE     Pt is able to sit up and ambulate without difficulty. Procedural site remains dry and intact with good circulation, motion, and sensation. No signs and symptoms of bleeding/hematoma noted. Instruction provided, patient/family verbalizes understanding. Dr. Estephania Valencia spoke with patient post procedure.      Pt ambulated to [facility] for discharged    Follow up Appointment: VINAYAK RN messaged regarding follow up drain check, possible removal in 1 week

## 2023-02-13 NOTE — DISCHARGE INSTRUCTIONS
Pr-14  4.2  [phone]     Patient Name:  Voncille [place]    Procedure:  Drain check    Site Care: Do not remove the dressing over the catheter/tube. Please contact Interventional Radiology for concerns related to this dressing. Activity/Diet  No heavy lifting or strenuous activity for 24 hours. Continue same site care as previously instructed    Medications: Take acetaminophen if needed for pain. Do not exceed 4000mg of acetaminophen in a 24-hour period. and Make no changes to your existing medications. Contact Interventional Radiology at [phone] if you have severe/unrelieved pain, fever, chills, dizziness/lightheadedness, or drainage/bleeding from your incision site.     Expect a phone call from PAT Nurse (pre-admission testing nurse) to schedule next drain check

## 2023-02-14 ENCOUNTER — TELEPHONE (OUTPATIENT)
Dept: INTERVENTIONAL RADIOLOGY/VASCULAR | Facility: HOSPITAL | Age: 63
End: 2023-02-14

## 2023-02-14 NOTE — INTERVAL H&P NOTE
The above referenced H&P was reviewed by Liz Guidry MD on 2/14/2023, the patient was examined and no significant changes have occurred in the patient's condition since the H&P was performed. Risks, benefits, alternative treatments and consequences of no treatment were discussed. We will proceed with procedure as planned.       Liz Guidry MD  2/14/2023  2:39 PM

## 2023-02-18 ENCOUNTER — LAB ENCOUNTER (OUTPATIENT)
Dept: LAB | Age: 63
End: 2023-02-18
Attending: RADIOLOGY
Payer: MEDICARE

## 2023-02-18 DIAGNOSIS — Z01.818 PRE-OP TESTING: ICD-10-CM

## 2023-02-18 DIAGNOSIS — F41.8 DEPRESSION WITH ANXIETY: ICD-10-CM

## 2023-02-18 RX ORDER — SERTRALINE HYDROCHLORIDE 25 MG/1
25 TABLET, FILM COATED ORAL DAILY
Qty: 90 TABLET | Refills: 0 | OUTPATIENT
Start: 2023-02-18

## 2023-02-19 LAB — SARS-COV-2 RNA RESP QL NAA+PROBE: NOT DETECTED

## 2023-02-20 ENCOUNTER — OFFICE VISIT (OUTPATIENT)
Dept: SURGERY | Facility: CLINIC | Age: 63
End: 2023-02-20

## 2023-02-20 VITALS — WEIGHT: 122 LBS | HEIGHT: 59 IN | BODY MASS INDEX: 24.6 KG/M2

## 2023-02-20 DIAGNOSIS — K57.30 DIVERTICULAR DISEASE OF LARGE INTESTINE WITH COMPLICATION: Primary | ICD-10-CM

## 2023-02-20 PROCEDURE — 99214 OFFICE O/P EST MOD 30 MIN: CPT | Performed by: SURGERY

## 2023-02-21 ENCOUNTER — HOSPITAL ENCOUNTER (OUTPATIENT)
Dept: INTERVENTIONAL RADIOLOGY/VASCULAR | Facility: HOSPITAL | Age: 63
Discharge: HOME | End: 2023-02-21
Attending: RADIOLOGY | Admitting: RADIOLOGY
Payer: MEDICARE

## 2023-02-21 VITALS
TEMPERATURE: 97 F | OXYGEN SATURATION: 98 % | RESPIRATION RATE: 16 BRPM | SYSTOLIC BLOOD PRESSURE: 139 MMHG | BODY MASS INDEX: 24.6 KG/M2 | WEIGHT: 122 LBS | DIASTOLIC BLOOD PRESSURE: 89 MMHG | HEIGHT: 59 IN | HEART RATE: 59 BPM

## 2023-02-21 DIAGNOSIS — Z01.818 PRE-OP TESTING: Primary | ICD-10-CM

## 2023-02-21 DIAGNOSIS — Z87.19 HISTORY OF DIVERTICULAR ABSCESS: ICD-10-CM

## 2023-02-21 PROCEDURE — BW111ZZ FLUOROSCOPY OF ABDOMEN AND PELVIS USING LOW OSMOLAR CONTRAST: ICD-10-PCS | Performed by: RADIOLOGY

## 2023-02-21 PROCEDURE — 76080 X-RAY EXAM OF FISTULA: CPT

## 2023-02-21 PROCEDURE — 0WPJX0Z REMOVAL OF DRAINAGE DEVICE FROM PELVIC CAVITY, EXTERNAL APPROACH: ICD-10-PCS | Performed by: RADIOLOGY

## 2023-02-21 PROCEDURE — 49424 ASSESS CYST CONTRAST INJECT: CPT

## 2023-02-21 NOTE — DISCHARGE INSTRUCTIONS
Pr-14  4.2  [phone]     Patient Name:  [place]    Procedure:  Tube removal    Site Care: Remove your band-aid or dressing in 24 hours. Gently wash area with soap and water. Activity/Diet  No heavy lifting or strenuous activity for 48 hours. Drink plenty of fluids, unless you have otherwise been told to restrict your fluid intake. Do not drink alcohol for 24 hours. Do not drive,  operate heavy machinery, make important decisions or sign legal documents today. Medications: Take acetaminophen if needed for pain. Do not exceed 4000mg of acetaminophen in a 24-hour period. and Make no changes to your existing medications. Contact Interventional Radiology at [phone] if you have severe/unrelieved pain, fever, chills, dizziness/lightheadedness, or drainage/bleeding from your incision site.

## 2023-02-21 NOTE — INTERVAL H&P NOTE
The above referenced H&P was reviewed by Lara Lima MD on 2/21/2023, the patient was examined and no significant changes have occurred in the patient's condition since the H&P was performed. Risks, benefits, alternative treatments and consequences of no treatment were discussed. We will proceed with procedure as planned.       Lara Lima MD  2/21/2023  2:09 PM

## 2023-02-21 NOTE — IVS NOTE
Drain removed by Dr. Bill Jung. No complaints of pain. Discharge instructions given. Pt/ verbalized understanding. Discharge to home ambulatory.

## 2023-03-01 DIAGNOSIS — M48.061 SPINAL STENOSIS OF LUMBAR REGION WITHOUT NEUROGENIC CLAUDICATION: ICD-10-CM

## 2023-03-01 DIAGNOSIS — M19.90 OSTEOARTHRITIS, UNSPECIFIED OSTEOARTHRITIS TYPE, UNSPECIFIED SITE: ICD-10-CM

## 2023-03-01 RX ORDER — TRAMADOL HYDROCHLORIDE 50 MG/1
50 TABLET ORAL 2 TIMES DAILY PRN
Qty: 60 TABLET | Refills: 0 | Status: SHIPPED | OUTPATIENT
Start: 2023-03-01

## 2023-03-03 ENCOUNTER — TELEPHONE (OUTPATIENT)
Dept: FAMILY MEDICINE CLINIC | Facility: CLINIC | Age: 63
End: 2023-03-03

## 2023-03-03 NOTE — TELEPHONE ENCOUNTER
[facility] called;   Seeking verbal orders to approve to discharge patient 3/16/23 for skilled nursing.

## 2023-03-06 ENCOUNTER — HOSPITAL ENCOUNTER (INPATIENT)
Facility: HOSPITAL | Age: 63
LOS: 4 days | Discharge: HOME | End: 2023-03-11
Attending: EMERGENCY MEDICINE | Admitting: FAMILY MEDICINE
Payer: MEDICARE

## 2023-03-06 ENCOUNTER — APPOINTMENT (OUTPATIENT)
Dept: CT IMAGING | Facility: HOSPITAL | Age: 63
DRG: 330 | End: 2023-03-06
Attending: EMERGENCY MEDICINE
Payer: MEDICARE

## 2023-03-06 ENCOUNTER — APPOINTMENT (OUTPATIENT)
Dept: CT IMAGING | Facility: HOSPITAL | Age: 63
End: 2023-03-06
Attending: EMERGENCY MEDICINE
Payer: MEDICARE

## 2023-03-06 ENCOUNTER — HOSPITAL ENCOUNTER (INPATIENT)
Facility: HOSPITAL | Age: 63
LOS: 4 days | Discharge: HOME HEALTH | DRG: 330 | End: 2023-03-11
Attending: EMERGENCY MEDICINE | Admitting: FAMILY MEDICINE
Payer: MEDICARE

## 2023-03-06 DIAGNOSIS — K57.00: Primary | ICD-10-CM

## 2023-03-06 LAB
ALBUMIN SERPL-MCNC: 4.2 G/DL (ref 3.4–5)
ALBUMIN/GLOB SERPL: 1.2 {RATIO} (ref 1–2)
ALP LIVER SERPL-CCNC: 53 U/L
ALT SERPL-CCNC: 19 U/L
ANION GAP SERPL CALC-SCNC: 7 MMOL/L (ref 0–18)
AST SERPL-CCNC: 14 U/L (ref 15–37)
BASOPHILS # BLD AUTO: 0.02 X10(3) UL (ref 0–0.2)
BASOPHILS NFR BLD AUTO: 0.2 %
BILIRUB SERPL-MCNC: 0.8 MG/DL (ref 0.1–2)
BUN BLD-MCNC: 13 MG/DL (ref 7–18)
BUN/CREAT SERPL: 14.6 (ref 10–20)
CALCIUM BLD-MCNC: 9.3 MG/DL (ref 8.5–10.1)
CHLORIDE SERPL-SCNC: 100 MMOL/L (ref 98–112)
CO2 SERPL-SCNC: 27 MMOL/L (ref 21–32)
CREAT BLD-MCNC: 0.89 MG/DL
DEPRECATED RDW RBC AUTO: 42.3 FL (ref 35.1–46.3)
EOSINOPHIL # BLD AUTO: 0.04 X10(3) UL (ref 0–0.7)
EOSINOPHIL NFR BLD AUTO: 0.4 %
ERYTHROCYTE [DISTWIDTH] IN BLOOD BY AUTOMATED COUNT: 12.9 % (ref 11–15)
GFR SERPLBLD BASED ON 1.73 SQ M-ARVRAT: 72 ML/MIN/1.73M2 (ref 60–?)
GLOBULIN PLAS-MCNC: 3.4 G/DL (ref 2.8–4.4)
GLUCOSE BLD-MCNC: 159 MG/DL (ref 70–99)
HCT VFR BLD AUTO: 39.4 %
HGB BLD-MCNC: 13.2 G/DL
IMM GRANULOCYTES # BLD AUTO: 0.03 X10(3) UL (ref 0–1)
IMM GRANULOCYTES NFR BLD: 0.3 %
LIPASE SERPL-CCNC: 114 U/L (ref 73–393)
LIPASE SERPL-CCNC: 30 U/L (ref 13–75)
LYMPHOCYTES # BLD AUTO: 0.67 X10(3) UL (ref 1–4)
LYMPHOCYTES NFR BLD AUTO: 6.4 %
MCH RBC QN AUTO: 29.9 PG (ref 26–34)
MCHC RBC AUTO-ENTMCNC: 33.5 G/DL (ref 31–37)
MCV RBC AUTO: 89.1 FL
MONOCYTES # BLD AUTO: 0.35 X10(3) UL (ref 0.1–1)
MONOCYTES NFR BLD AUTO: 3.4 %
NEUTROPHILS # BLD AUTO: 9.33 X10 (3) UL (ref 1.5–7.7)
NEUTROPHILS # BLD AUTO: 9.33 X10(3) UL (ref 1.5–7.7)
NEUTROPHILS NFR BLD AUTO: 89.3 %
OSMOLALITY SERPL CALC.SUM OF ELEC: 281 MOSM/KG (ref 275–295)
PLATELET # BLD AUTO: 308 10(3)UL (ref 150–450)
POTASSIUM SERPL-SCNC: 4 MMOL/L (ref 3.5–5.1)
PROT SERPL-MCNC: 7.6 G/DL (ref 6.4–8.2)
RBC # BLD AUTO: 4.42 X10(6)UL
SARS-COV-2 RNA RESP QL NAA+PROBE: NOT DETECTED
SODIUM SERPL-SCNC: 134 MMOL/L (ref 136–145)
WBC # BLD AUTO: 10.4 X10(3) UL (ref 4–11)

## 2023-03-06 PROCEDURE — 74177 CT ABD & PELVIS W/CONTRAST: CPT | Performed by: EMERGENCY MEDICINE

## 2023-03-06 PROCEDURE — 99223 1ST HOSP IP/OBS HIGH 75: CPT | Performed by: FAMILY MEDICINE

## 2023-03-06 RX ORDER — MORPHINE SULFATE 4 MG/ML
4 INJECTION, SOLUTION INTRAMUSCULAR; INTRAVENOUS ONCE
Status: COMPLETED | OUTPATIENT
Start: 2023-03-06 | End: 2023-03-06

## 2023-03-06 RX ORDER — METRONIDAZOLE 500 MG/100ML
500 INJECTION, SOLUTION INTRAVENOUS ONCE
Status: COMPLETED | OUTPATIENT
Start: 2023-03-06 | End: 2023-03-06

## 2023-03-07 LAB — GLUCOSE BLDC GLUCOMTR-MCNC: 122 MG/DL (ref 70–99)

## 2023-03-07 PROCEDURE — 99233 SBSQ HOSP IP/OBS HIGH 50: CPT | Performed by: HOSPITALIST

## 2023-03-07 PROCEDURE — 99223 1ST HOSP IP/OBS HIGH 75: CPT | Performed by: SURGERY

## 2023-03-07 RX ORDER — MORPHINE SULFATE 2 MG/ML
1 INJECTION, SOLUTION INTRAMUSCULAR; INTRAVENOUS EVERY 2 HOUR PRN
Status: DISCONTINUED | OUTPATIENT
Start: 2023-03-07 | End: 2023-03-11

## 2023-03-07 RX ORDER — ACETAMINOPHEN 500 MG
500 TABLET ORAL EVERY 4 HOURS PRN
Status: DISCONTINUED | OUTPATIENT
Start: 2023-03-07 | End: 2023-03-11

## 2023-03-07 RX ORDER — SENNOSIDES 8.6 MG
17.2 TABLET ORAL NIGHTLY PRN
Status: DISCONTINUED | OUTPATIENT
Start: 2023-03-07 | End: 2023-03-11

## 2023-03-07 RX ORDER — MORPHINE SULFATE 4 MG/ML
4 INJECTION, SOLUTION INTRAMUSCULAR; INTRAVENOUS EVERY 30 MIN PRN
Status: ACTIVE | OUTPATIENT
Start: 2023-03-07 | End: 2023-03-07

## 2023-03-07 RX ORDER — HYDRALAZINE HYDROCHLORIDE 20 MG/ML
10 INJECTION INTRAMUSCULAR; INTRAVENOUS EVERY 6 HOURS PRN
Status: DISCONTINUED | OUTPATIENT
Start: 2023-03-07 | End: 2023-03-11

## 2023-03-07 RX ORDER — ENOXAPARIN SODIUM 100 MG/ML
40 INJECTION SUBCUTANEOUS DAILY
Status: DISCONTINUED | OUTPATIENT
Start: 2023-03-07 | End: 2023-03-08 | Stop reason: ALTCHOICE

## 2023-03-07 RX ORDER — ONDANSETRON 2 MG/ML
4 INJECTION INTRAMUSCULAR; INTRAVENOUS EVERY 4 HOURS PRN
Status: ACTIVE | OUTPATIENT
Start: 2023-03-07 | End: 2023-03-07

## 2023-03-07 RX ORDER — BENZONATATE 100 MG/1
200 CAPSULE ORAL 3 TIMES DAILY PRN
Status: DISCONTINUED | OUTPATIENT
Start: 2023-03-07 | End: 2023-03-11

## 2023-03-07 RX ORDER — METRONIDAZOLE 500 MG/100ML
500 INJECTION, SOLUTION INTRAVENOUS EVERY 8 HOURS
Status: DISCONTINUED | OUTPATIENT
Start: 2023-03-07 | End: 2023-03-10

## 2023-03-07 RX ORDER — SODIUM CHLORIDE 9 MG/ML
INJECTION, SOLUTION INTRAVENOUS CONTINUOUS
Status: ACTIVE | OUTPATIENT
Start: 2023-03-07 | End: 2023-03-07

## 2023-03-07 RX ORDER — MORPHINE SULFATE 4 MG/ML
4 INJECTION, SOLUTION INTRAMUSCULAR; INTRAVENOUS EVERY 2 HOUR PRN
Status: DISCONTINUED | OUTPATIENT
Start: 2023-03-07 | End: 2023-03-11

## 2023-03-07 RX ORDER — ONDANSETRON 2 MG/ML
4 INJECTION INTRAMUSCULAR; INTRAVENOUS EVERY 6 HOURS PRN
Status: DISCONTINUED | OUTPATIENT
Start: 2023-03-07 | End: 2023-03-08

## 2023-03-07 RX ORDER — POLYETHYLENE GLYCOL 3350 17 G/17G
17 POWDER, FOR SOLUTION ORAL DAILY PRN
Status: DISCONTINUED | OUTPATIENT
Start: 2023-03-07 | End: 2023-03-11

## 2023-03-07 RX ORDER — SODIUM CHLORIDE 9 MG/ML
75 INJECTION, SOLUTION INTRAVENOUS CONTINUOUS
Status: DISCONTINUED | OUTPATIENT
Start: 2023-03-07 | End: 2023-03-09

## 2023-03-07 RX ORDER — MORPHINE SULFATE 2 MG/ML
2 INJECTION, SOLUTION INTRAMUSCULAR; INTRAVENOUS EVERY 2 HOUR PRN
Status: DISCONTINUED | OUTPATIENT
Start: 2023-03-07 | End: 2023-03-11

## 2023-03-07 NOTE — CM/SW NOTE
Patient is current with [facility] for RN services. Resumption of care orders placed.        AMBROSIO Ram, [facility]    [id number]

## 2023-03-07 NOTE — ED INITIAL ASSESSMENT (HPI)
Pt to ED via triage with c/o abd since noon, associated with nausea and vomiting and fevers. Pt reports she had a drain removed a week ago, that was placed for an abscess.

## 2023-03-07 NOTE — ED QUICK NOTES
Orders for admission, patient is aware of plan and ready to go upstairs. Any questions, please call ED RN [facility], [address] at [address].      Patient Covid vaccination status: Fully vaccinated     COVID Test Ordered in ED: Rapid SARS-CoV-2 by PCR    COVID Suspicion at Admission: N/A    Running Infusions:      Mental Status/LOC at time of transport: A&O*4    Other pertinent information:   CIWA score: N/A   NIH score:  N/A

## 2023-03-07 NOTE — PLAN OF CARE
[place] is A+O x 4. Vitals stable. NPO. Ambulating without assistance. IVF infusing, IVPB antibiotic. PRN morphine for pain. Lovenox for DVT prophylaxis. Eliminating waste adequately. Bed low, locked, and all safety measures in place. Current plan of care includes bowel rest and IV ABX; surgery on consult.     Problem: Patient Centered Care  Goal: Patient preferences are identified and integrated in the patient's plan of care  Description: Interventions:  - What would you like us to know as we care for you?   - Provide timely, complete, and accurate information to patient/family  - Incorporate patient and family knowledge, values, beliefs, and cultural backgrounds into the planning and delivery of care  - Encourage patient/family to participate in care and decision-making at the level they choose  - Honor patient and family perspectives and choices  Outcome: Progressing     Problem: PAIN - ADULT  Goal: Verbalizes/displays adequate comfort level or patient's stated pain goal  Description: INTERVENTIONS:  - Encourage pt to monitor pain and request assistance  - Assess pain using appropriate pain scale  - Administer analgesics based on type and severity of pain and evaluate response  - Implement non-pharmacological measures as appropriate and evaluate response  - Consider cultural and social influences on pain and pain management  - Manage/alleviate anxiety  - Utilize distraction and/or relaxation techniques  - Monitor for opioid side effects  - Notify MD/LIP if interventions unsuccessful or patient reports new pain  - Anticipate increased pain with activity and pre-medicate as appropriate  Outcome: Progressing     Problem: RISK FOR INFECTION - ADULT  Goal: Absence of fever/infection during anticipated neutropenic period  Description: INTERVENTIONS  - Monitor WBC  - Administer growth factors as ordered  - Implement neutropenic guidelines  Outcome: Progressing     Problem: DISCHARGE PLANNING  Goal: Discharge to home or other facility with appropriate resources  Description: INTERVENTIONS:  - Identify barriers to discharge w/pt and caregiver  - Include patient/family/discharge partner in discharge planning  - Arrange for needed discharge resources and transportation as appropriate  - Identify discharge learning needs (meds, wound care, etc)  - Arrange for interpreters to assist at discharge as needed  - Consider post-discharge preferences of patient/family/discharge partner  - Complete POLST form as appropriate  - Assess patient's ability to be responsible for managing their own health  - Refer to Case Management Department for coordinating discharge planning if the patient needs post-hospital services based on physician/LIP order or complex needs related to functional status, cognitive ability or social support system  Outcome: Progressing     Problem: Altered Communication/Language Barrier  Goal: Patient/Family is able to understand and participate in their care  Description: Interventions:  - Assess communication ability and preferred communication style  - Implement communication aides and strategies  - Use visual cues when possible  - Listen attentively, be patient, do not interrupt  - Minimize distractions  - Allow time for understanding and response  - Establish method for patient to ask for assistance (call light)  - Provide an  as needed  - Communicate barriers and strategies to overcome with those who interact with patient  Outcome: Progressing

## 2023-03-07 NOTE — PLAN OF CARE
Patient admitted from ED. Alert and oriented. On room air. NPO. Patient ran fever of 101.9. Per MD- ok to give PO Tylenol. Pain managed with PRN Morphine. IVF infusing. IV Abx. Call light within reach. Appropriate safety precautions maintained.     Problem: PAIN - ADULT  Goal: Verbalizes/displays adequate comfort level or patient's stated pain goal  Description: INTERVENTIONS:  - Encourage pt to monitor pain and request assistance  - Assess pain using appropriate pain scale  - Administer analgesics based on type and severity of pain and evaluate response  - Implement non-pharmacological measures as appropriate and evaluate response  - Consider cultural and social influences on pain and pain management  - Manage/alleviate anxiety  - Utilize distraction and/or relaxation techniques  - Monitor for opioid side effects  - Notify MD/LIP if interventions unsuccessful or patient reports new pain  - Anticipate increased pain with activity and pre-medicate as appropriate  Outcome: Progressing     Problem: RISK FOR INFECTION - ADULT  Goal: Absence of fever/infection during anticipated neutropenic period  Description: INTERVENTIONS  - Monitor WBC  - Administer growth factors as ordered  - Implement neutropenic guidelines  Outcome: Progressing     Problem: DISCHARGE PLANNING  Goal: Discharge to home or other facility with appropriate resources  Description: INTERVENTIONS:  - Identify barriers to discharge w/pt and caregiver  - Include patient/family/discharge partner in discharge planning  - Arrange for needed discharge resources and transportation as appropriate  - Identify discharge learning needs (meds, wound care, etc)  - Arrange for interpreters to assist at discharge as needed  - Consider post-discharge preferences of patient/family/discharge partner  - Complete POLST form as appropriate  - Assess patient's ability to be responsible for managing their own health  - Refer to Case Management Department for coordinating discharge planning if the patient needs post-hospital services based on physician/LIP order or complex needs related to functional status, cognitive ability or social support system  Outcome: Progressing

## 2023-03-08 ENCOUNTER — ANESTHESIA (OUTPATIENT)
Dept: SURGERY | Facility: HOSPITAL | Age: 63
End: 2023-03-08
Payer: MEDICARE

## 2023-03-08 ENCOUNTER — ANESTHESIA EVENT (OUTPATIENT)
Dept: SURGERY | Facility: HOSPITAL | Age: 63
End: 2023-03-08
Payer: MEDICARE

## 2023-03-08 LAB
ALBUMIN SERPL-MCNC: 2.8 G/DL (ref 3.4–5)
ALBUMIN/GLOB SERPL: 0.8 {RATIO} (ref 1–2)
ALP LIVER SERPL-CCNC: 48 U/L
ALT SERPL-CCNC: 11 U/L
ANION GAP SERPL CALC-SCNC: 8 MMOL/L (ref 0–18)
ANTIBODY SCREEN: NEGATIVE
AST SERPL-CCNC: 8 U/L (ref 15–37)
BASOPHILS # BLD AUTO: 0.01 X10(3) UL (ref 0–0.2)
BASOPHILS NFR BLD AUTO: 0.1 %
BILIRUB SERPL-MCNC: 0.6 MG/DL (ref 0.1–2)
BUN BLD-MCNC: 8 MG/DL (ref 7–18)
BUN/CREAT SERPL: 14.8 (ref 10–20)
CALCIUM BLD-MCNC: 7.9 MG/DL (ref 8.5–10.1)
CHLORIDE SERPL-SCNC: 109 MMOL/L (ref 98–112)
CO2 SERPL-SCNC: 22 MMOL/L (ref 21–32)
CREAT BLD-MCNC: 0.54 MG/DL
DEPRECATED RDW RBC AUTO: 43.2 FL (ref 35.1–46.3)
EOSINOPHIL # BLD AUTO: 0 X10(3) UL (ref 0–0.7)
EOSINOPHIL NFR BLD AUTO: 0 %
ERYTHROCYTE [DISTWIDTH] IN BLOOD BY AUTOMATED COUNT: 13.1 % (ref 11–15)
GFR SERPLBLD BASED ON 1.73 SQ M-ARVRAT: 103 ML/MIN/1.73M2 (ref 60–?)
GLOBULIN PLAS-MCNC: 3.3 G/DL (ref 2.8–4.4)
GLUCOSE BLD-MCNC: 95 MG/DL (ref 70–99)
HCT VFR BLD AUTO: 30.5 %
HGB BLD-MCNC: 10.1 G/DL
IMM GRANULOCYTES # BLD AUTO: 0.04 X10(3) UL (ref 0–1)
IMM GRANULOCYTES NFR BLD: 0.4 %
INR BLD: 1.35 (ref 0.85–1.16)
LYMPHOCYTES # BLD AUTO: 0.83 X10(3) UL (ref 1–4)
LYMPHOCYTES NFR BLD AUTO: 9.3 %
MAGNESIUM SERPL-MCNC: 1.9 MG/DL (ref 1.6–2.6)
MCH RBC QN AUTO: 29.9 PG (ref 26–34)
MCHC RBC AUTO-ENTMCNC: 33.1 G/DL (ref 31–37)
MCV RBC AUTO: 90.2 FL
MONOCYTES # BLD AUTO: 0.52 X10(3) UL (ref 0.1–1)
MONOCYTES NFR BLD AUTO: 5.8 %
NEUTROPHILS # BLD AUTO: 7.57 X10 (3) UL (ref 1.5–7.7)
NEUTROPHILS # BLD AUTO: 7.57 X10(3) UL (ref 1.5–7.7)
NEUTROPHILS NFR BLD AUTO: 84.4 %
OSMOLALITY SERPL CALC.SUM OF ELEC: 286 MOSM/KG (ref 275–295)
PLATELET # BLD AUTO: 207 10(3)UL (ref 150–450)
POTASSIUM SERPL-SCNC: 3.5 MMOL/L (ref 3.5–5.1)
PROT SERPL-MCNC: 6.1 G/DL (ref 6.4–8.2)
PROTHROMBIN TIME: 16.7 SECONDS (ref 11.6–14.8)
RBC # BLD AUTO: 3.38 X10(6)UL
RH BLOOD TYPE: POSITIVE
SODIUM SERPL-SCNC: 139 MMOL/L (ref 136–145)
WBC # BLD AUTO: 9 X10(3) UL (ref 4–11)

## 2023-03-08 PROCEDURE — 44120 REMOVAL OF SMALL INTESTINE: CPT | Performed by: SURGERY

## 2023-03-08 PROCEDURE — 99232 SBSQ HOSP IP/OBS MODERATE 35: CPT | Performed by: SURGERY

## 2023-03-08 PROCEDURE — 99233 SBSQ HOSP IP/OBS HIGH 50: CPT | Performed by: INTERNAL MEDICINE

## 2023-03-08 PROCEDURE — 0DBA0ZZ EXCISION OF JEJUNUM, OPEN APPROACH: ICD-10-PCS | Performed by: SURGERY

## 2023-03-08 RX ORDER — HYDROMORPHONE HYDROCHLORIDE 1 MG/ML
0.6 INJECTION, SOLUTION INTRAMUSCULAR; INTRAVENOUS; SUBCUTANEOUS EVERY 5 MIN PRN
Status: DISCONTINUED | OUTPATIENT
Start: 2023-03-08 | End: 2023-03-08 | Stop reason: HOSPADM

## 2023-03-08 RX ORDER — ONDANSETRON 2 MG/ML
INJECTION INTRAMUSCULAR; INTRAVENOUS AS NEEDED
Status: DISCONTINUED | OUTPATIENT
Start: 2023-03-08 | End: 2023-03-08 | Stop reason: SURG

## 2023-03-08 RX ORDER — NALOXONE HYDROCHLORIDE 0.4 MG/ML
80 INJECTION, SOLUTION INTRAMUSCULAR; INTRAVENOUS; SUBCUTANEOUS AS NEEDED
Status: DISCONTINUED | OUTPATIENT
Start: 2023-03-08 | End: 2023-03-08 | Stop reason: HOSPADM

## 2023-03-08 RX ORDER — PHENYLEPHRINE HCL 10 MG/ML
VIAL (ML) INJECTION AS NEEDED
Status: DISCONTINUED | OUTPATIENT
Start: 2023-03-08 | End: 2023-03-08 | Stop reason: SURG

## 2023-03-08 RX ORDER — DEXAMETHASONE SODIUM PHOSPHATE 4 MG/ML
VIAL (ML) INJECTION AS NEEDED
Status: DISCONTINUED | OUTPATIENT
Start: 2023-03-08 | End: 2023-03-08 | Stop reason: SURG

## 2023-03-08 RX ORDER — HYDROMORPHONE HYDROCHLORIDE 1 MG/ML
0.2 INJECTION, SOLUTION INTRAMUSCULAR; INTRAVENOUS; SUBCUTANEOUS EVERY 5 MIN PRN
Status: DISCONTINUED | OUTPATIENT
Start: 2023-03-08 | End: 2023-03-08 | Stop reason: HOSPADM

## 2023-03-08 RX ORDER — SODIUM CHLORIDE, SODIUM LACTATE, POTASSIUM CHLORIDE, CALCIUM CHLORIDE 600; 310; 30; 20 MG/100ML; MG/100ML; MG/100ML; MG/100ML
INJECTION, SOLUTION INTRAVENOUS CONTINUOUS
Status: DISCONTINUED | OUTPATIENT
Start: 2023-03-08 | End: 2023-03-08 | Stop reason: HOSPADM

## 2023-03-08 RX ORDER — HYDROMORPHONE HYDROCHLORIDE 1 MG/ML
0.4 INJECTION, SOLUTION INTRAMUSCULAR; INTRAVENOUS; SUBCUTANEOUS EVERY 5 MIN PRN
Status: DISCONTINUED | OUTPATIENT
Start: 2023-03-08 | End: 2023-03-08 | Stop reason: HOSPADM

## 2023-03-08 RX ORDER — MORPHINE SULFATE 4 MG/ML
4 INJECTION, SOLUTION INTRAMUSCULAR; INTRAVENOUS EVERY 10 MIN PRN
Status: DISCONTINUED | OUTPATIENT
Start: 2023-03-08 | End: 2023-03-08 | Stop reason: HOSPADM

## 2023-03-08 RX ORDER — NEOSTIGMINE METHYLSULFATE 1 MG/ML
INJECTION, SOLUTION INTRAVENOUS AS NEEDED
Status: DISCONTINUED | OUTPATIENT
Start: 2023-03-08 | End: 2023-03-08 | Stop reason: SURG

## 2023-03-08 RX ORDER — OXYCODONE HYDROCHLORIDE 5 MG/1
10 TABLET ORAL EVERY 4 HOURS PRN
Status: DISCONTINUED | OUTPATIENT
Start: 2023-03-08 | End: 2023-03-11

## 2023-03-08 RX ORDER — EPHEDRINE SULFATE 50 MG/ML
INJECTION, SOLUTION INTRAVENOUS AS NEEDED
Status: DISCONTINUED | OUTPATIENT
Start: 2023-03-08 | End: 2023-03-08 | Stop reason: SURG

## 2023-03-08 RX ORDER — MORPHINE SULFATE 4 MG/ML
2 INJECTION, SOLUTION INTRAMUSCULAR; INTRAVENOUS EVERY 10 MIN PRN
Status: DISCONTINUED | OUTPATIENT
Start: 2023-03-08 | End: 2023-03-08 | Stop reason: HOSPADM

## 2023-03-08 RX ORDER — KETOROLAC TROMETHAMINE 30 MG/ML
30 INJECTION, SOLUTION INTRAMUSCULAR; INTRAVENOUS EVERY 6 HOURS
Status: DISPENSED | OUTPATIENT
Start: 2023-03-08 | End: 2023-03-10

## 2023-03-08 RX ORDER — HEPARIN SODIUM 5000 [USP'U]/ML
5000 INJECTION, SOLUTION INTRAVENOUS; SUBCUTANEOUS EVERY 12 HOURS SCHEDULED
Status: DISCONTINUED | OUTPATIENT
Start: 2023-03-09 | End: 2023-03-11

## 2023-03-08 RX ORDER — ROCURONIUM BROMIDE 10 MG/ML
INJECTION, SOLUTION INTRAVENOUS AS NEEDED
Status: DISCONTINUED | OUTPATIENT
Start: 2023-03-08 | End: 2023-03-08 | Stop reason: SURG

## 2023-03-08 RX ORDER — ONDANSETRON 2 MG/ML
4 INJECTION INTRAMUSCULAR; INTRAVENOUS EVERY 6 HOURS PRN
Status: DISCONTINUED | OUTPATIENT
Start: 2023-03-08 | End: 2023-03-08 | Stop reason: HOSPADM

## 2023-03-08 RX ORDER — SODIUM CHLORIDE, SODIUM LACTATE, POTASSIUM CHLORIDE, CALCIUM CHLORIDE 600; 310; 30; 20 MG/100ML; MG/100ML; MG/100ML; MG/100ML
INJECTION, SOLUTION INTRAVENOUS CONTINUOUS PRN
Status: DISCONTINUED | OUTPATIENT
Start: 2023-03-08 | End: 2023-03-08 | Stop reason: SURG

## 2023-03-08 RX ORDER — LIDOCAINE HYDROCHLORIDE 10 MG/ML
INJECTION, SOLUTION EPIDURAL; INFILTRATION; INTRACAUDAL; PERINEURAL AS NEEDED
Status: DISCONTINUED | OUTPATIENT
Start: 2023-03-08 | End: 2023-03-08 | Stop reason: SURG

## 2023-03-08 RX ORDER — PROCHLORPERAZINE EDISYLATE 5 MG/ML
5 INJECTION INTRAMUSCULAR; INTRAVENOUS EVERY 8 HOURS PRN
Status: DISCONTINUED | OUTPATIENT
Start: 2023-03-08 | End: 2023-03-08 | Stop reason: HOSPADM

## 2023-03-08 RX ORDER — OXYCODONE HYDROCHLORIDE 5 MG/1
5 TABLET ORAL EVERY 4 HOURS PRN
Status: DISCONTINUED | OUTPATIENT
Start: 2023-03-08 | End: 2023-03-11

## 2023-03-08 RX ORDER — HYDROMORPHONE HYDROCHLORIDE 1 MG/ML
0.4 INJECTION, SOLUTION INTRAMUSCULAR; INTRAVENOUS; SUBCUTANEOUS EVERY 2 HOUR PRN
Status: DISCONTINUED | OUTPATIENT
Start: 2023-03-08 | End: 2023-03-11

## 2023-03-08 RX ORDER — SODIUM CHLORIDE, SODIUM LACTATE, POTASSIUM CHLORIDE, CALCIUM CHLORIDE 600; 310; 30; 20 MG/100ML; MG/100ML; MG/100ML; MG/100ML
INJECTION, SOLUTION INTRAVENOUS CONTINUOUS
Status: DISCONTINUED | OUTPATIENT
Start: 2023-03-08 | End: 2023-03-09

## 2023-03-08 RX ORDER — GLYCOPYRROLATE 0.2 MG/ML
INJECTION, SOLUTION INTRAMUSCULAR; INTRAVENOUS AS NEEDED
Status: DISCONTINUED | OUTPATIENT
Start: 2023-03-08 | End: 2023-03-08 | Stop reason: SURG

## 2023-03-08 RX ORDER — HYDROMORPHONE HYDROCHLORIDE 1 MG/ML
0.8 INJECTION, SOLUTION INTRAMUSCULAR; INTRAVENOUS; SUBCUTANEOUS EVERY 2 HOUR PRN
Status: DISCONTINUED | OUTPATIENT
Start: 2023-03-08 | End: 2023-03-11

## 2023-03-08 RX ORDER — ONDANSETRON 2 MG/ML
4 INJECTION INTRAMUSCULAR; INTRAVENOUS EVERY 6 HOURS PRN
Status: DISCONTINUED | OUTPATIENT
Start: 2023-03-08 | End: 2023-03-11

## 2023-03-08 RX ORDER — MORPHINE SULFATE 10 MG/ML
6 INJECTION, SOLUTION INTRAMUSCULAR; INTRAVENOUS EVERY 10 MIN PRN
Status: DISCONTINUED | OUTPATIENT
Start: 2023-03-08 | End: 2023-03-08 | Stop reason: HOSPADM

## 2023-03-08 RX ORDER — PROCHLORPERAZINE EDISYLATE 5 MG/ML
5 INJECTION INTRAMUSCULAR; INTRAVENOUS EVERY 8 HOURS PRN
Status: DISCONTINUED | OUTPATIENT
Start: 2023-03-08 | End: 2023-03-11

## 2023-03-08 RX ADMIN — GLYCOPYRROLATE 1 MG: 0.2 INJECTION, SOLUTION INTRAMUSCULAR; INTRAVENOUS at 17:43:00

## 2023-03-08 RX ADMIN — NEOSTIGMINE METHYLSULFATE 5 MG: 1 INJECTION, SOLUTION INTRAVENOUS at 17:43:00

## 2023-03-08 RX ADMIN — SODIUM CHLORIDE, SODIUM LACTATE, POTASSIUM CHLORIDE, CALCIUM CHLORIDE: 600; 310; 30; 20 INJECTION, SOLUTION INTRAVENOUS at 17:55:00

## 2023-03-08 RX ADMIN — ROCURONIUM BROMIDE 40 MG: 10 INJECTION, SOLUTION INTRAVENOUS at 16:31:00

## 2023-03-08 RX ADMIN — LIDOCAINE HYDROCHLORIDE 50 MG: 10 INJECTION, SOLUTION EPIDURAL; INFILTRATION; INTRACAUDAL; PERINEURAL at 16:17:00

## 2023-03-08 RX ADMIN — ONDANSETRON 4 MG: 2 INJECTION INTRAMUSCULAR; INTRAVENOUS at 16:11:00

## 2023-03-08 RX ADMIN — PHENYLEPHRINE HCL 100 MCG: 10 MG/ML VIAL (ML) INJECTION at 16:36:00

## 2023-03-08 RX ADMIN — SODIUM CHLORIDE, SODIUM LACTATE, POTASSIUM CHLORIDE, CALCIUM CHLORIDE: 600; 310; 30; 20 INJECTION, SOLUTION INTRAVENOUS at 16:11:00

## 2023-03-08 RX ADMIN — EPHEDRINE SULFATE 10 MG: 50 INJECTION, SOLUTION INTRAVENOUS at 17:15:00

## 2023-03-08 RX ADMIN — SODIUM CHLORIDE: 9 INJECTION, SOLUTION INTRAVENOUS at 16:23:00

## 2023-03-08 RX ADMIN — SODIUM CHLORIDE, SODIUM LACTATE, POTASSIUM CHLORIDE, CALCIUM CHLORIDE: 600; 310; 30; 20 INJECTION, SOLUTION INTRAVENOUS at 17:42:00

## 2023-03-08 RX ADMIN — GLYCOPYRROLATE 0.2 MG: 0.2 INJECTION, SOLUTION INTRAMUSCULAR; INTRAVENOUS at 16:11:00

## 2023-03-08 RX ADMIN — DEXAMETHASONE SODIUM PHOSPHATE 4 MG: 4 MG/ML VIAL (ML) INJECTION at 16:11:00

## 2023-03-08 RX ADMIN — PHENYLEPHRINE HCL 100 MCG: 10 MG/ML VIAL (ML) INJECTION at 16:52:00

## 2023-03-08 RX ADMIN — ROCURONIUM BROMIDE 10 MG: 10 INJECTION, SOLUTION INTRAVENOUS at 16:17:00

## 2023-03-08 NOTE — PLAN OF CARE
Patient is alert and oriented. On room air. Vital signs stable. No complaints of nausea overnight. Voiding freely. Pain managed with PRN Morphine. Up with standby assist.  IVF infusing. IV Abx. NPO. Call light within reach. Appropriate safety precautions maintained.       Problem: PAIN - ADULT  Goal: Verbalizes/displays adequate comfort level or patient's stated pain goal  Description: INTERVENTIONS:  - Encourage pt to monitor pain and request assistance  - Assess pain using appropriate pain scale  - Administer analgesics based on type and severity of pain and evaluate response  - Implement non-pharmacological measures as appropriate and evaluate response  - Consider cultural and social influences on pain and pain management  - Manage/alleviate anxiety  - Utilize distraction and/or relaxation techniques  - Monitor for opioid side effects  - Notify MD/LIP if interventions unsuccessful or patient reports new pain  - Anticipate increased pain with activity and pre-medicate as appropriate  Outcome: Progressing     Problem: RISK FOR INFECTION - ADULT  Goal: Absence of fever/infection during anticipated neutropenic period  Description: INTERVENTIONS  - Monitor WBC  - Administer growth factors as ordered  - Implement neutropenic guidelines  Outcome: Progressing     Problem: DISCHARGE PLANNING  Goal: Discharge to home or other facility with appropriate resources  Description: INTERVENTIONS:  - Identify barriers to discharge w/pt and caregiver  - Include patient/family/discharge partner in discharge planning  - Arrange for needed discharge resources and transportation as appropriate  - Identify discharge learning needs (meds, wound care, etc)  - Arrange for interpreters to assist at discharge as needed  - Consider post-discharge preferences of patient/family/discharge partner  - Complete POLST form as appropriate  - Assess patient's ability to be responsible for managing their own health  - Refer to Case Management Department for coordinating discharge planning if the patient needs post-hospital services based on physician/LIP order or complex needs related to functional status, cognitive ability or social support system  Outcome: Progressing

## 2023-03-08 NOTE — ANESTHESIA PROCEDURE NOTES
Airway  Date/Time: 3/8/2023 4:19 PM  Urgency: Elective    Airway not difficult    General Information and Staff    Patient location during procedure: OR  Anesthesiologist: Murphy Gowers, MD  Resident/CRNA: Dave Herring CRNA  Performed: CRNA   Performed by: Dave Herring CRNA  Authorized by: Murphy Gowers, MD      Indications and Patient Condition  Indications for airway management: anesthesia  Sedation level: deep  Preoxygenated: yes  Patient position: sniffing  Mask difficulty assessment: 0 - not attempted    Final Airway Details  Final airway type: endotracheal airway      Successful airway: ETT  Cuffed: yes   Successful intubation technique: direct laryngoscopy  Facilitating devices/methods: intubating stylet, cricoid pressure and rapid sequence intubation (modified RSI)  Endotracheal tube insertion site: oral  Blade: Wendi  Blade size: #3  ETT size (mm): 7.0    Cormack-Lehane Classification: grade I - full view of glottis  Placement verified by: chest auscultation and capnometry   Measured from: teeth  ETT to teeth (cm): 20  Number of attempts at approach: 1

## 2023-03-08 NOTE — OPERATIVE REPORT
[facility]     Operative Report    Patient Name:  Nicolle Zayas  MR:  [id number]  :  1960  DOS:  23    Preop Dx:  perforated diverticular jejunum, acute abdomen  Postop Dx:  perforated diverticular jejunum, acute abdomen  Procedure:  Exploratory laparotomy, small bowel resection  Surgeon:  Chance Walsh MD  Surgical Assistant.: Arlyss Corporal  PA: Sandra Adair PA-C, CSA  EBL: 50 ml  Complication:  None    INDICATION:  Pt is a 58year old female who with perforated diverticular jejunum, acute abdomen who is scheduled for a exploratory laparotomy, small bowel resection. CONSENT:  An informed consent discussion was held with the patient regarding the nature of jejunal or colon perforation, the treatment options and the details of the procedure. The risks including but not limited to bleeding, wound infection, incomplete resection, anastomotic leak/bleeding/stricture, internal and incisional hernias were discussed. The patient expressed understanding and want to proceed with the planned procedure. TECHNIQUE:  The patient was taken to the OR and placed in supine position. GET was established and the abdomen was prepped in standard fashion. A midline incision was made from the epigastric area to the pubis. The fascia was incised and the abdomen was explored. Purulent fluid was seen throughout the abdomen consistent with free perforation and infection. We noted a loop of proximal small bowel that was densely fibrotic. This is the site of the previous abscess and subsequent perforation. The remaining small bowel was examined. 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 No other abnormality was seen. The sigmoid colon was examined carefully. Although there are diverticulosis, no evidence of prior diverticulitis was seen. I concluded that the etiology of her prior admission was solely due to the jejunal diverticulitis and perforation.   The decision was made to resect the segment of perforated jejunal diverticulum. The proximal and distal segment of bowel to this area were divided using 55 x 3.5 mm linear stapler. The mesentery was divided using the Enseal.  The specimen was sent to pathology. Bowel continuity was restored by performing a side to side small bowel anastomosis using a 55 x 3.5 mm stapler. The common enterotomy was closed using 4-0 PDS. A reinforcement suture was placed at the end of the staple line. The mesenteric defect was closed using 3-0 silk suture. The small bowel anastomosis appeared widely patent, hemostatic and not under tension. We copiously irrigated the abdominal cavity with warm saline to decontaminate the peritoneal cavity. Adequate hemostasis was seen. The midline fascia was closed using 0 looped PDS. The skin was closed with staples and the subcutaneous tissue was packed with intermittent betadine soaked telfa strips. Dressings were applied. All instrument and sponge counts were correct. I was present during the critical portions of the procedure.     [place], MD

## 2023-03-08 NOTE — ANESTHESIA PROCEDURE NOTES
Peripheral IV  Date/Time: 3/8/2023 4:22 PM  Inserted by: Bruno Mock CRNA    Placement  Needle size: 18 G  Laterality: right  Location: hand  Local anesthetic: none  Site prep: alcohol  Technique: anatomical landmarks  Attempts: 1

## 2023-03-08 NOTE — ANESTHESIA POSTPROCEDURE EVALUATION
Patient: Radha Fraser    Procedure Summary     Date: 03/08/23 Room / Location: [facility] MAIN OR 01 / [address] [facility] MAIN OR    Anesthesia Start: 7864 Anesthesia Stop: 1619    Procedure: exploratory laparotomy, small bowel resection (Abdomen) Diagnosis: (perforated diverticular jejunum, acute abdomen)    Surgeons: Leyla Zavala MD Anesthesiologist: Demar Chadwick MD    Anesthesia Type: general ASA Status: 3          Anesthesia Type: general    Vitals Value Taken Time   /81 03/08/23 1754   Temp 98.2 03/08/23 1756   Pulse 91 03/08/23 1755   Resp 20 03/08/23 1755   SpO2 96 % 03/08/23 1755   Vitals shown include unvalidated device data.     [address] [facility] AN Post Evaluation:   Patient Evaluated in PACU  Patient Participation: complete - patient participated  Level of Consciousness: awake  Pain Score: 0  Pain Management: adequate  Airway Patency:patent  Dental exam unchanged from preop  Yes    Cardiovascular Status: acceptable  Respiratory Status: acceptable  Postoperative Hydration acceptable      Katerine Sullivan CRNA  3/8/2023 5:56 PM

## 2023-03-09 LAB
ALBUMIN SERPL-MCNC: 2.4 G/DL (ref 3.4–5)
ALBUMIN/GLOB SERPL: 0.7 {RATIO} (ref 1–2)
ALP LIVER SERPL-CCNC: 49 U/L
ALT SERPL-CCNC: 12 U/L
ANION GAP SERPL CALC-SCNC: 7 MMOL/L (ref 0–18)
AST SERPL-CCNC: 13 U/L (ref 15–37)
BASOPHILS # BLD AUTO: 0.01 X10(3) UL (ref 0–0.2)
BASOPHILS NFR BLD AUTO: 0.1 %
BILIRUB SERPL-MCNC: 0.5 MG/DL (ref 0.1–2)
BUN BLD-MCNC: 8 MG/DL (ref 7–18)
BUN/CREAT SERPL: 17 (ref 10–20)
CALCIUM BLD-MCNC: 7.9 MG/DL (ref 8.5–10.1)
CHLORIDE SERPL-SCNC: 109 MMOL/L (ref 98–112)
CO2 SERPL-SCNC: 23 MMOL/L (ref 21–32)
CREAT BLD-MCNC: 0.47 MG/DL
DEPRECATED RDW RBC AUTO: 42.8 FL (ref 35.1–46.3)
EOSINOPHIL # BLD AUTO: 0 X10(3) UL (ref 0–0.7)
EOSINOPHIL NFR BLD AUTO: 0 %
ERYTHROCYTE [DISTWIDTH] IN BLOOD BY AUTOMATED COUNT: 12.9 % (ref 11–15)
GFR SERPLBLD BASED ON 1.73 SQ M-ARVRAT: 106 ML/MIN/1.73M2 (ref 60–?)
GLOBULIN PLAS-MCNC: 3.5 G/DL (ref 2.8–4.4)
GLUCOSE BLD-MCNC: 135 MG/DL (ref 70–99)
HCT VFR BLD AUTO: 30.3 %
HGB BLD-MCNC: 9.9 G/DL
IMM GRANULOCYTES # BLD AUTO: 0.04 X10(3) UL (ref 0–1)
IMM GRANULOCYTES NFR BLD: 0.4 %
LYMPHOCYTES # BLD AUTO: 0.57 X10(3) UL (ref 1–4)
LYMPHOCYTES NFR BLD AUTO: 5.7 %
MCH RBC QN AUTO: 29.6 PG (ref 26–34)
MCHC RBC AUTO-ENTMCNC: 32.7 G/DL (ref 31–37)
MCV RBC AUTO: 90.7 FL
MONOCYTES # BLD AUTO: 0.42 X10(3) UL (ref 0.1–1)
MONOCYTES NFR BLD AUTO: 4.2 %
NEUTROPHILS # BLD AUTO: 9 X10 (3) UL (ref 1.5–7.7)
NEUTROPHILS # BLD AUTO: 9 X10(3) UL (ref 1.5–7.7)
NEUTROPHILS NFR BLD AUTO: 89.6 %
OSMOLALITY SERPL CALC.SUM OF ELEC: 288 MOSM/KG (ref 275–295)
PLATELET # BLD AUTO: 234 10(3)UL (ref 150–450)
POTASSIUM SERPL-SCNC: 3.9 MMOL/L (ref 3.5–5.1)
PROT SERPL-MCNC: 5.9 G/DL (ref 6.4–8.2)
RBC # BLD AUTO: 3.34 X10(6)UL
SODIUM SERPL-SCNC: 139 MMOL/L (ref 136–145)
WBC # BLD AUTO: 10 X10(3) UL (ref 4–11)

## 2023-03-09 PROCEDURE — 99233 SBSQ HOSP IP/OBS HIGH 50: CPT | Performed by: INTERNAL MEDICINE

## 2023-03-09 RX ORDER — DIPHENHYDRAMINE HYDROCHLORIDE 50 MG/ML
25 INJECTION INTRAMUSCULAR; INTRAVENOUS EVERY 6 HOURS PRN
Status: DISCONTINUED | OUTPATIENT
Start: 2023-03-09 | End: 2023-03-11

## 2023-03-09 RX ORDER — DIPHENHYDRAMINE HYDROCHLORIDE 50 MG/ML
25 INJECTION INTRAMUSCULAR; INTRAVENOUS EVERY 6 HOURS PRN
Status: DISCONTINUED | OUTPATIENT
Start: 2023-03-09 | End: 2023-03-09

## 2023-03-09 RX ORDER — DEXTROSE, SODIUM CHLORIDE, AND POTASSIUM CHLORIDE 5; .45; .15 G/100ML; G/100ML; G/100ML
INJECTION INTRAVENOUS CONTINUOUS
Status: DISCONTINUED | OUTPATIENT
Start: 2023-03-09 | End: 2023-03-10

## 2023-03-09 NOTE — PLAN OF CARE
[place] is A+O x 4. Vitals stable. Ambulating without assistance. IVF infusing, IVPB antibiotic. PRN morphine for pain. Lovenox for DVT prophylaxis. Eliminating waste adequately. Bed low, locked, and all safety measures in place. POD 0; exploratory laparotomy, small bowel resection.      Problem: Patient Centered Care  Goal: Patient preferences are identified and integrated in the patient's plan of care  Description: Interventions:  - What would you like us to know as we care for you?   - Provide timely, complete, and accurate information to patient/family  - Incorporate patient and family knowledge, values, beliefs, and cultural backgrounds into the planning and delivery of care  - Encourage patient/family to participate in care and decision-making at the level they choose  - Honor patient and family perspectives and choices  Outcome: Progressing      Problem: PAIN - ADULT  Goal: Verbalizes/displays adequate comfort level or patient's stated pain goal  Description: INTERVENTIONS:  - Encourage pt to monitor pain and request assistance  - Assess pain using appropriate pain scale  - Administer analgesics based on type and severity of pain and evaluate response  - Implement non-pharmacological measures as appropriate and evaluate response  - Consider cultural and social influences on pain and pain management  - Manage/alleviate anxiety  - Utilize distraction and/or relaxation techniques  - Monitor for opioid side effects  - Notify MD/LIP if interventions unsuccessful or patient reports new pain  - Anticipate increased pain with activity and pre-medicate as appropriate  Outcome: Progressing     Problem: RISK FOR INFECTION - ADULT  Goal: Absence of fever/infection during anticipated neutropenic period  Description: INTERVENTIONS  - Monitor WBC  - Administer growth factors as ordered  - Implement neutropenic guidelines  Outcome: Progressing     Problem: DISCHARGE PLANNING  Goal: Discharge to home or other facility with appropriate resources  Description: INTERVENTIONS:  - Identify barriers to discharge w/pt and caregiver  - Include patient/family/discharge partner in discharge planning  - Arrange for needed discharge resources and transportation as appropriate  - Identify discharge learning needs (meds, wound care, etc)  - Arrange for interpreters to assist at discharge as needed  - Consider post-discharge preferences of patient/family/discharge partner  - Complete POLST form as appropriate  - Assess patient's ability to be responsible for managing their own health  - Refer to Case Management Department for coordinating discharge planning if the patient needs post-hospital services based on physician/LIP order or complex needs related to functional status, cognitive ability or social support system  Outcome: Progressing     Problem: Altered Communication/Language Barrier  Goal: Patient/Family is able to understand and participate in their care  Description: Interventions:  - Assess communication ability and preferred communication style  - Implement communication aides and strategies  - Use visual cues when possible  - Listen attentively, be patient, do not interrupt  - Minimize distractions  - Allow time for understanding and response  - Establish method for patient to ask for assistance (call light)  - Provide an  as needed  - Communicate barriers and strategies to overcome with those who interact with patient  Outcome: Progressing

## 2023-03-09 NOTE — PLAN OF CARE
POD 1. Dressing in place to abdomen. Has not passed gas yet or had BM. NG tube to LIS with 175 mL of clear/tan output. NPO - tolerating sips of clears per MD order. Oxy, dilaudid, and toradol provided for pain. Denies nausea. Mann in place. Afebrile. Receiving IV fluids and abx. Plan is pending medical course. All safety precautions in place. Problem: Patient Centered Care  Goal: Patient preferences are identified and integrated in the patient's plan of care  Description: Interventions:  - What would you like us to know as we care for you?  \  - Provide timely, complete, and accurate information to patient/family  - Incorporate patient and family knowledge, values, beliefs, and cultural backgrounds into the planning and delivery of care  - Encourage patient/family to participate in care and decision-making at the level they choose  - Honor patient and family perspectives and choices  Outcome: Progressing     Problem: Patient/Family Goals  Goal: Patient/Family Long Term Goal  Description: Patient's Long Term Goal: \    Interventions:  -   - See additional Care Plan goals for specific interventions  Outcome: Progressing  Goal: Patient/Family Short Term Goal  Description: Patient's Short Term Goal:     Interventions:   -   - See additional Care Plan goals for specific interventions  Outcome: Progressing     Problem: PAIN - ADULT  Goal: Verbalizes/displays adequate comfort level or patient's stated pain goal  Description: INTERVENTIONS:  - Encourage pt to monitor pain and request assistance  - Assess pain using appropriate pain scale  - Administer analgesics based on type and severity of pain and evaluate response  - Implement non-pharmacological measures as appropriate and evaluate response  - Consider cultural and social influences on pain and pain management  - Manage/alleviate anxiety  - Utilize distraction and/or relaxation techniques  - Monitor for opioid side effects  - Notify MD/LIP if interventions unsuccessful or patient reports new pain  - Anticipate increased pain with activity and pre-medicate as appropriate  Outcome: Progressing     Problem: RISK FOR INFECTION - ADULT  Goal: Absence of fever/infection during anticipated neutropenic period  Description: INTERVENTIONS  - Monitor WBC  - Administer growth factors as ordered  - Implement neutropenic guidelines  Outcome: Progressing     Problem: DISCHARGE PLANNING  Goal: Discharge to home or other facility with appropriate resources  Description: INTERVENTIONS:  - Identify barriers to discharge w/pt and caregiver  - Include patient/family/discharge partner in discharge planning  - Arrange for needed discharge resources and transportation as appropriate  - Identify discharge learning needs (meds, wound care, etc)  - Arrange for interpreters to assist at discharge as needed  - Consider post-discharge preferences of patient/family/discharge partner  - Complete POLST form as appropriate  - Assess patient's ability to be responsible for managing their own health  - Refer to Case Management Department for coordinating discharge planning if the patient needs post-hospital services based on physician/LIP order or complex needs related to functional status, cognitive ability or social support system  Outcome: Progressing     Problem: Altered Communication/Language Barrier  Goal: Patient/Family is able to understand and participate in their care  Description: Interventions:  - Assess communication ability and preferred communication style  - Implement communication aides and strategies  - Use visual cues when possible  - Listen attentively, be patient, do not interrupt  - Minimize distractions  - Allow time for understanding and response  - Establish method for patient to ask for assistance (call light)  - Provide an  as needed  - Communicate barriers and strategies to overcome with those who interact with patient  Outcome: Progressing

## 2023-03-10 ENCOUNTER — MED REC SCAN ONLY (OUTPATIENT)
Dept: FAMILY MEDICINE CLINIC | Facility: CLINIC | Age: 63
End: 2023-03-10

## 2023-03-10 LAB
ANION GAP SERPL CALC-SCNC: 5 MMOL/L (ref 0–18)
BASOPHILS # BLD AUTO: 0.02 X10(3) UL (ref 0–0.2)
BASOPHILS NFR BLD AUTO: 0.3 %
BUN BLD-MCNC: 7 MG/DL (ref 7–18)
BUN/CREAT SERPL: 15.2 (ref 10–20)
CALCIUM BLD-MCNC: 7.9 MG/DL (ref 8.5–10.1)
CHLORIDE SERPL-SCNC: 109 MMOL/L (ref 98–112)
CO2 SERPL-SCNC: 26 MMOL/L (ref 21–32)
CREAT BLD-MCNC: 0.46 MG/DL
DEPRECATED RDW RBC AUTO: 40.2 FL (ref 35.1–46.3)
EOSINOPHIL # BLD AUTO: 0.07 X10(3) UL (ref 0–0.7)
EOSINOPHIL NFR BLD AUTO: 1.1 %
ERYTHROCYTE [DISTWIDTH] IN BLOOD BY AUTOMATED COUNT: 12.6 % (ref 11–15)
GFR SERPLBLD BASED ON 1.73 SQ M-ARVRAT: 107 ML/MIN/1.73M2 (ref 60–?)
GLUCOSE BLD-MCNC: 149 MG/DL (ref 70–99)
HCT VFR BLD AUTO: 26.2 %
HGB BLD-MCNC: 8.9 G/DL
IMM GRANULOCYTES # BLD AUTO: 0.04 X10(3) UL (ref 0–1)
IMM GRANULOCYTES NFR BLD: 0.6 %
LYMPHOCYTES # BLD AUTO: 1.15 X10(3) UL (ref 1–4)
LYMPHOCYTES NFR BLD AUTO: 18.2 %
MCH RBC QN AUTO: 29.6 PG (ref 26–34)
MCHC RBC AUTO-ENTMCNC: 34 G/DL (ref 31–37)
MCV RBC AUTO: 87 FL
MONOCYTES # BLD AUTO: 0.5 X10(3) UL (ref 0.1–1)
MONOCYTES NFR BLD AUTO: 7.9 %
NEUTROPHILS # BLD AUTO: 4.54 X10 (3) UL (ref 1.5–7.7)
NEUTROPHILS # BLD AUTO: 4.54 X10(3) UL (ref 1.5–7.7)
NEUTROPHILS NFR BLD AUTO: 71.9 %
OSMOLALITY SERPL CALC.SUM OF ELEC: 291 MOSM/KG (ref 275–295)
PLATELET # BLD AUTO: 264 10(3)UL (ref 150–450)
POTASSIUM SERPL-SCNC: 3.3 MMOL/L (ref 3.5–5.1)
RBC # BLD AUTO: 3.01 X10(6)UL
SODIUM SERPL-SCNC: 140 MMOL/L (ref 136–145)
WBC # BLD AUTO: 6.3 X10(3) UL (ref 4–11)

## 2023-03-10 PROCEDURE — 99233 SBSQ HOSP IP/OBS HIGH 50: CPT | Performed by: INTERNAL MEDICINE

## 2023-03-10 RX ORDER — DOCOSANOL 100 MG/G
CREAM TOPICAL DAILY
Status: DISCONTINUED | OUTPATIENT
Start: 2023-03-10 | End: 2023-03-11

## 2023-03-10 RX ORDER — PANTOPRAZOLE SODIUM 40 MG/1
40 TABLET, DELAYED RELEASE ORAL
Status: DISCONTINUED | OUTPATIENT
Start: 2023-03-11 | End: 2023-03-11

## 2023-03-10 RX ORDER — ATORVASTATIN CALCIUM 10 MG/1
10 TABLET, FILM COATED ORAL NIGHTLY
Status: DISCONTINUED | OUTPATIENT
Start: 2023-03-10 | End: 2023-03-11

## 2023-03-10 RX ORDER — LOSARTAN POTASSIUM 50 MG/1
50 TABLET ORAL DAILY
Status: DISCONTINUED | OUTPATIENT
Start: 2023-03-10 | End: 2023-03-11

## 2023-03-10 RX ORDER — TEMAZEPAM 15 MG/1
15 CAPSULE ORAL NIGHTLY PRN
Status: DISCONTINUED | OUTPATIENT
Start: 2023-03-10 | End: 2023-03-11

## 2023-03-10 RX ORDER — GABAPENTIN 300 MG/1
300 CAPSULE ORAL 2 TIMES DAILY
Status: DISCONTINUED | OUTPATIENT
Start: 2023-03-10 | End: 2023-03-11

## 2023-03-10 RX ORDER — SODIUM CHLORIDE, SODIUM LACTATE, POTASSIUM CHLORIDE, CALCIUM CHLORIDE 600; 310; 30; 20 MG/100ML; MG/100ML; MG/100ML; MG/100ML
INJECTION, SOLUTION INTRAVENOUS CONTINUOUS
Status: DISCONTINUED | OUTPATIENT
Start: 2023-03-10 | End: 2023-03-11

## 2023-03-10 RX ORDER — POTASSIUM CHLORIDE 20 MEQ/1
20 TABLET, EXTENDED RELEASE ORAL DAILY
Status: DISCONTINUED | OUTPATIENT
Start: 2023-03-10 | End: 2023-03-11

## 2023-03-10 NOTE — PLAN OF CARE
Patient only had half the bag of K+ rider, refused second half due to burning to hand. MD notified. Patient received 20 meq of K+ PO per protocol. IVFs changed to LR at 100 per MD.   NG and Mann discontinued this morning. Denies nausea or vomiting. Denies increase abdominal pain. Tolerating clears. Patient is voiding clear, yellow urine. Pain is controlled. Per patient she was very confused and disoriented after Dilaudid last night - do not give. PRN Morphine and Oxycodone given with no disorientation or confusion noted. Call light within reach.  at bedside.      Problem: Patient Centered Care  Goal: Patient preferences are identified and integrated in the patient's plan of care  Description: Interventions:  - Provide timely, complete, and accurate information to patient/family  - Incorporate patient and family knowledge, values, beliefs, and cultural backgrounds into the planning and delivery of care  - Encourage patient/family to participate in care and decision-making at the level they choose  - Honor patient and family perspectives and choices  Outcome: Progressing    Problem: PAIN - ADULT  Goal: Verbalizes/displays adequate comfort level or patient's stated pain goal  Description: INTERVENTIONS:  - Encourage pt to monitor pain and request assistance  - Assess pain using appropriate pain scale  - Administer analgesics based on type and severity of pain and evaluate response  - Implement non-pharmacological measures as appropriate and evaluate response  - Consider cultural and social influences on pain and pain management  - Manage/alleviate anxiety  - Utilize distraction and/or relaxation techniques  - Monitor for opioid side effects  - Notify MD/LIP if interventions unsuccessful or patient reports new pain  - Anticipate increased pain with activity and pre-medicate as appropriate  Outcome: Progressing     Problem: RISK FOR INFECTION - ADULT  Goal: Absence of fever/infection during anticipated neutropenic period  Description: INTERVENTIONS  - Monitor WBC  - Administer growth factors as ordered  - Implement neutropenic guidelines  Outcome: Progressing     Problem: DISCHARGE PLANNING  Goal: Discharge to home or other facility with appropriate resources  Description: INTERVENTIONS:  - Identify barriers to discharge w/pt and caregiver  - Include patient/family/discharge partner in discharge planning  - Arrange for needed discharge resources and transportation as appropriate  - Identify discharge learning needs (meds, wound care, etc)  - Arrange for interpreters to assist at discharge as needed  - Consider post-discharge preferences of patient/family/discharge partner  - Complete POLST form as appropriate  - Assess patient's ability to be responsible for managing their own health  - Refer to Case Management Department for coordinating discharge planning if the patient needs post-hospital services based on physician/LIP order or complex needs related to functional status, cognitive ability or social support system  Outcome: Progressing     Problem: Altered Communication/Language Barrier  Goal: Patient/Family is able to understand and participate in their care  Description: Interventions:  - Assess communication ability and preferred communication style  - Implement communication aides and strategies  - Use visual cues when possible  - Listen attentively, be patient, do not interrupt  - Minimize distractions  - Allow time for understanding and response  - Establish method for patient to ask for assistance (call light)  - Provide an  as needed  - Communicate barriers and strategies to overcome with those who interact with patient  Outcome: Progressing

## 2023-03-10 NOTE — PROGRESS NOTES
[facility] Pharmacy Note:  Renal Adjustment for aztreonam (AZACTAM)    Phoenix Maddox is a 58year old patient who has been prescribed aztreonam (AZACTAM) 1gm every 6 hrs. The estimated creatinine clearance is 107.1 mL/min (A) (based on SCr of 0.47 mg/dL (L)). The dose has been adjusted to aztreonam (AZACTAM) 1gm every 8 hrs per hospital renal dose adjustment protocol for treatment of intra-abdominal infection. Pharmacy will follow and adjust dose as warranted for additional renal function changes.     Thank you,    Masha Villarreal, PharmD  3/10/2023  12:00 AM

## 2023-03-11 VITALS
HEART RATE: 80 BPM | SYSTOLIC BLOOD PRESSURE: 144 MMHG | TEMPERATURE: 99 F | WEIGHT: 123.63 LBS | BODY MASS INDEX: 24.92 KG/M2 | HEIGHT: 59 IN | OXYGEN SATURATION: 98 % | RESPIRATION RATE: 18 BRPM | DIASTOLIC BLOOD PRESSURE: 92 MMHG

## 2023-03-11 LAB
ANION GAP SERPL CALC-SCNC: 6 MMOL/L (ref 0–18)
BASOPHILS # BLD AUTO: 0.05 X10(3) UL (ref 0–0.2)
BASOPHILS NFR BLD AUTO: 1 %
BUN BLD-MCNC: 4 MG/DL (ref 7–18)
BUN/CREAT SERPL: 7.5 (ref 10–20)
CALCIUM BLD-MCNC: 8.4 MG/DL (ref 8.5–10.1)
CHLORIDE SERPL-SCNC: 111 MMOL/L (ref 98–112)
CO2 SERPL-SCNC: 26 MMOL/L (ref 21–32)
CREAT BLD-MCNC: 0.53 MG/DL
DEPRECATED RDW RBC AUTO: 41.2 FL (ref 35.1–46.3)
EOSINOPHIL # BLD AUTO: 0.37 X10(3) UL (ref 0–0.7)
EOSINOPHIL NFR BLD AUTO: 7 %
ERYTHROCYTE [DISTWIDTH] IN BLOOD BY AUTOMATED COUNT: 12.8 % (ref 11–15)
GFR SERPLBLD BASED ON 1.73 SQ M-ARVRAT: 103 ML/MIN/1.73M2 (ref 60–?)
GLUCOSE BLD-MCNC: 104 MG/DL (ref 70–99)
HCT VFR BLD AUTO: 29.6 %
HGB BLD-MCNC: 9.9 G/DL
IMM GRANULOCYTES # BLD AUTO: 0.08 X10(3) UL (ref 0–1)
IMM GRANULOCYTES NFR BLD: 1.5 %
LYMPHOCYTES # BLD AUTO: 1.46 X10(3) UL (ref 1–4)
LYMPHOCYTES NFR BLD AUTO: 27.8 %
MCH RBC QN AUTO: 29.3 PG (ref 26–34)
MCHC RBC AUTO-ENTMCNC: 33.4 G/DL (ref 31–37)
MCV RBC AUTO: 87.6 FL
MONOCYTES # BLD AUTO: 0.44 X10(3) UL (ref 0.1–1)
MONOCYTES NFR BLD AUTO: 8.4 %
NEUTROPHILS # BLD AUTO: 2.85 X10 (3) UL (ref 1.5–7.7)
NEUTROPHILS # BLD AUTO: 2.85 X10(3) UL (ref 1.5–7.7)
NEUTROPHILS NFR BLD AUTO: 54.3 %
OSMOLALITY SERPL CALC.SUM OF ELEC: 293 MOSM/KG (ref 275–295)
PLATELET # BLD AUTO: 343 10(3)UL (ref 150–450)
POTASSIUM SERPL-SCNC: 3.5 MMOL/L (ref 3.5–5.1)
POTASSIUM SERPL-SCNC: 3.5 MMOL/L (ref 3.5–5.1)
RBC # BLD AUTO: 3.38 X10(6)UL
SODIUM SERPL-SCNC: 143 MMOL/L (ref 136–145)
WBC # BLD AUTO: 5.3 X10(3) UL (ref 4–11)

## 2023-03-11 PROCEDURE — 99239 HOSP IP/OBS DSCHRG MGMT >30: CPT | Performed by: INTERNAL MEDICINE

## 2023-03-11 RX ORDER — OXYCODONE HYDROCHLORIDE 5 MG/1
5 TABLET ORAL EVERY 6 HOURS PRN
Qty: 10 TABLET | Refills: 0 | Status: SHIPPED | OUTPATIENT
Start: 2023-03-11 | End: 2023-03-30 | Stop reason: ALTCHOICE

## 2023-03-11 NOTE — PLAN OF CARE
Problem: Patient Centered Care  Goal: Patient preferences are identified and integrated in the patient's plan of care  Description: Interventions:  - What would you like us to know as we care for you?   - Provide timely, complete, and accurate information to patient/family  - Incorporate patient and family knowledge, values, beliefs, and cultural backgrounds into the planning and delivery of care  - Encourage patient/family to participate in care and decision-making at the level they choose  - Honor patient and family perspectives and choices  Outcome: Adequate for Discharge     Problem: Patient/Family Goals  Goal: Patient/Family Long Term Goal  Description: Patient's Long Term Goal:     Interventions:  -   - See additional Care Plan goals for specific interventions  Outcome: Adequate for Discharge  Goal: Patient/Family Short Term Goal  Description: Patient's Short Term Goal:     Interventions:   -   - See additional Care Plan goals for specific interventions  Outcome: Adequate for Discharge     Problem: PAIN - ADULT  Goal: Verbalizes/displays adequate comfort level or patient's stated pain goal  Description: INTERVENTIONS:  - Encourage pt to monitor pain and request assistance  - Assess pain using appropriate pain scale  - Administer analgesics based on type and severity of pain and evaluate response  - Implement non-pharmacological measures as appropriate and evaluate response  - Consider cultural and social influences on pain and pain management  - Manage/alleviate anxiety  - Utilize distraction and/or relaxation techniques  - Monitor for opioid side effects  - Notify MD/LIP if interventions unsuccessful or patient reports new pain  - Anticipate increased pain with activity and pre-medicate as appropriate  Outcome: Adequate for Discharge     Problem: RISK FOR INFECTION - ADULT  Goal: Absence of fever/infection during anticipated neutropenic period  Description: INTERVENTIONS  - Monitor WBC  - Administer growth factors as ordered  - Implement neutropenic guidelines  Outcome: Adequate for Discharge     Problem: DISCHARGE PLANNING  Goal: Discharge to home or other facility with appropriate resources  Description: INTERVENTIONS:  - Identify barriers to discharge w/pt and caregiver  - Include patient/family/discharge partner in discharge planning  - Arrange for needed discharge resources and transportation as appropriate  - Identify discharge learning needs (meds, wound care, etc)  - Arrange for interpreters to assist at discharge as needed  - Consider post-discharge preferences of patient/family/discharge partner  - Complete POLST form as appropriate  - Assess patient's ability to be responsible for managing their own health  - Refer to Case Management Department for coordinating discharge planning if the patient needs post-hospital services based on physician/LIP order or complex needs related to functional status, cognitive ability or social support system  Outcome: Adequate for Discharge     Problem: Altered Communication/Language Barrier  Goal: Patient/Family is able to understand and participate in their care  Description: Interventions:  - Assess communication ability and preferred communication style  - Implement communication aides and strategies  - Use visual cues when possible  - Listen attentively, be patient, do not interrupt  - Minimize distractions  - Allow time for understanding and response  - Establish method for patient to ask for assistance (call light)  - Provide an  as needed  - Communicate barriers and strategies to overcome with those who interact with patient  Outcome: Adequate for Discharge   Patient left in stable condition with all belongings and information about stay. Medication sent to preferred pharmacy.

## 2023-03-11 NOTE — PLAN OF CARE
Patient's pain managed with oxy and morphine. Dressing C/D/I, binder used. Up self, fluids and antibiotics infusing. Tolerating clears, no nausea or vomiting. Safety precautions in place.    Problem: Patient Centered Care  Goal: Patient preferences are identified and integrated in the patient's plan of care  Description: Interventions:  - What would you like us to know as we care for you?   - Provide timely, complete, and accurate information to patient/family  - Incorporate patient and family knowledge, values, beliefs, and cultural backgrounds into the planning and delivery of care  - Encourage patient/family to participate in care and decision-making at the level they choose  - Honor patient and family perspectives and choices  Outcome: Progressing     Problem: Patient/Family Goals  Goal: Patient/Family Long Term Goal  Description: Patient's Long Term Goal:     Interventions:  - See additional Care Plan goals for specific interventions  Outcome: Progressing  Goal: Patient/Family Short Term Goal  Description: Patient's Short Term Goal:     Interventions:   - See additional Care Plan goals for specific interventions  Outcome: Progressing     Problem: PAIN - ADULT  Goal: Verbalizes/displays adequate comfort level or patient's stated pain goal  Description: INTERVENTIONS:  - Encourage pt to monitor pain and request assistance  - Assess pain using appropriate pain scale  - Administer analgesics based on type and severity of pain and evaluate response  - Implement non-pharmacological measures as appropriate and evaluate response  - Consider cultural and social influences on pain and pain management  - Manage/alleviate anxiety  - Utilize distraction and/or relaxation techniques  - Monitor for opioid side effects  - Notify MD/LIP if interventions unsuccessful or patient reports new pain  - Anticipate increased pain with activity and pre-medicate as appropriate  Outcome: Progressing     Problem: RISK FOR INFECTION - ADULT  Goal: Absence of fever/infection during anticipated neutropenic period  Description: INTERVENTIONS  - Monitor WBC  - Administer growth factors as ordered  - Implement neutropenic guidelines  Outcome: Progressing     Problem: DISCHARGE PLANNING  Goal: Discharge to home or other facility with appropriate resources  Description: INTERVENTIONS:  - Identify barriers to discharge w/pt and caregiver  - Include patient/family/discharge partner in discharge planning  - Arrange for needed discharge resources and transportation as appropriate  - Identify discharge learning needs (meds, wound care, etc)  - Arrange for interpreters to assist at discharge as needed  - Consider post-discharge preferences of patient/family/discharge partner  - Complete POLST form as appropriate  - Assess patient's ability to be responsible for managing their own health  - Refer to Case Management Department for coordinating discharge planning if the patient needs post-hospital services based on physician/LIP order or complex needs related to functional status, cognitive ability or social support system  Outcome: Progressing     Problem: Altered Communication/Language Barrier  Goal: Patient/Family is able to understand and participate in their care  Description: Interventions:  - Assess communication ability and preferred communication style  - Implement communication aides and strategies  - Use visual cues when possible  - Listen attentively, be patient, do not interrupt  - Minimize distractions  - Allow time for understanding and response  - Establish method for patient to ask for assistance (call light)  - Provide an  as needed  - Communicate barriers and strategies to overcome with those who interact with patient  Outcome: Progressing

## 2023-03-13 ENCOUNTER — TELEPHONE (OUTPATIENT)
Dept: FAMILY MEDICINE CLINIC | Facility: CLINIC | Age: 63
End: 2023-03-13

## 2023-03-13 NOTE — TELEPHONE ENCOUNTER
Colby Handley RN states that resumption of [facility] care will be rescheduled for tomorrow.

## 2023-03-14 ENCOUNTER — PATIENT OUTREACH (OUTPATIENT)
Dept: CASE MANAGEMENT | Age: 63
End: 2023-03-14

## 2023-03-14 ENCOUNTER — TELEPHONE (OUTPATIENT)
Dept: FAMILY MEDICINE CLINIC | Facility: CLINIC | Age: 63
End: 2023-03-14

## 2023-03-14 ENCOUNTER — TELEPHONE (OUTPATIENT)
Dept: SURGERY | Facility: CLINIC | Age: 63
End: 2023-03-14

## 2023-03-14 DIAGNOSIS — Z02.9 ENCOUNTERS FOR UNSPECIFIED ADMINISTRATIVE PURPOSE: ICD-10-CM

## 2023-03-14 DIAGNOSIS — K57.00: ICD-10-CM

## 2023-03-14 PROCEDURE — 1111F DSCHRG MED/CURRENT MED MERGE: CPT

## 2023-03-14 NOTE — TELEPHONE ENCOUNTER
Please let patient know that we will reserve a spot for TCM for her on 3/24 after her visit with gen surg. Will hold off on actually making appt unless pt is ok with it. Thanks.

## 2023-03-15 NOTE — TELEPHONE ENCOUNTER
Talked to patient via language line, offered her the appointment below refused, she would like an appointment the end on this month an as much as possible late in the afternoon, no available appointment, Please advise, Thank you.     Please reply to pool: EM CC IM FM ALG RHE

## 2023-03-17 DIAGNOSIS — M19.90 OSTEOARTHRITIS, UNSPECIFIED OSTEOARTHRITIS TYPE, UNSPECIFIED SITE: ICD-10-CM

## 2023-03-17 DIAGNOSIS — M48.061 SPINAL STENOSIS OF LUMBAR REGION WITHOUT NEUROGENIC CLAUDICATION: ICD-10-CM

## 2023-03-17 NOTE — TELEPHONE ENCOUNTER
Spoke, with the patient and informed her of the message below. Pt did schedule an appointment to see Dr. Jay Singh on 3-30-23 at 5:00 this was approved per the message below.

## 2023-03-21 ENCOUNTER — OFFICE VISIT (OUTPATIENT)
Dept: SURGERY | Facility: CLINIC | Age: 63
End: 2023-03-21

## 2023-03-21 DIAGNOSIS — Z09 POSTOPERATIVE EXAMINATION: Primary | ICD-10-CM

## 2023-03-21 PROCEDURE — 99024 POSTOP FOLLOW-UP VISIT: CPT | Performed by: SURGERY

## 2023-03-21 NOTE — PROGRESS NOTES
Patient presents with:  Post-Op: Pt here for post op s/p exploratory laparotomy, small bowel resection 3/8/2023. Pt states here for staple removal.         O:  VSS  GEN:  No acute distress  Abd:  Soft, NTND, incision C/D/I with staples      Assessment   Postoperative examination  (primary encounter diagnosis)    Doing well sp sb resection. Staples removed. General diet as tolerated. Continue to avoid heavy lifting for another month. F/u prn.          Fausto Sauceda MD

## 2023-03-22 ENCOUNTER — TELEPHONE (OUTPATIENT)
Dept: FAMILY MEDICINE CLINIC | Facility: CLINIC | Age: 63
End: 2023-03-22

## 2023-03-22 RX ORDER — TRAMADOL HYDROCHLORIDE 50 MG/1
50 TABLET ORAL 2 TIMES DAILY PRN
Qty: 60 TABLET | Refills: 0 | Status: SHIPPED | OUTPATIENT
Start: 2023-03-22

## 2023-03-22 NOTE — TELEPHONE ENCOUNTER
Requesting verbal order to [facility] health services Friday 3/24/23 per patient request.

## 2023-03-23 ENCOUNTER — TELEPHONE (OUTPATIENT)
Dept: SURGERY | Facility: CLINIC | Age: 63
End: 2023-03-23

## 2023-03-23 NOTE — TELEPHONE ENCOUNTER
Patient requesting call back. States she would like to know how many staples were put on wound. States she still has little small staples and is worried there might still be some in her. States she is not in pain just worried. Patient is Maced[place] speaking.  Please advise

## 2023-03-28 ENCOUNTER — TELEPHONE (OUTPATIENT)
Dept: FAMILY MEDICINE CLINIC | Facility: CLINIC | Age: 63
End: 2023-03-28

## 2023-03-28 NOTE — TELEPHONE ENCOUNTER
Home health certification/recertification received, appropriate areas were signed and dated. Clinical staff to fax the certification and plan of care to the home health provider today.

## 2023-03-30 ENCOUNTER — OFFICE VISIT (OUTPATIENT)
Dept: FAMILY MEDICINE CLINIC | Facility: CLINIC | Age: 63
End: 2023-03-30

## 2023-03-30 VITALS
DIASTOLIC BLOOD PRESSURE: 67 MMHG | WEIGHT: 119 LBS | HEART RATE: 67 BPM | TEMPERATURE: 99 F | SYSTOLIC BLOOD PRESSURE: 143 MMHG | BODY MASS INDEX: 24 KG/M2

## 2023-03-30 DIAGNOSIS — M19.90 OSTEOARTHRITIS, UNSPECIFIED OSTEOARTHRITIS TYPE, UNSPECIFIED SITE: ICD-10-CM

## 2023-03-30 DIAGNOSIS — Z12.11 COLON CANCER SCREENING: ICD-10-CM

## 2023-03-30 DIAGNOSIS — F33.1 MODERATE EPISODE OF RECURRENT MAJOR DEPRESSIVE DISORDER (HCC): ICD-10-CM

## 2023-03-30 DIAGNOSIS — I10 ESSENTIAL HYPERTENSION: ICD-10-CM

## 2023-03-30 DIAGNOSIS — G47.00 INSOMNIA, UNSPECIFIED TYPE: ICD-10-CM

## 2023-03-30 DIAGNOSIS — Z09 HOSPITAL DISCHARGE FOLLOW-UP: Primary | ICD-10-CM

## 2023-03-30 DIAGNOSIS — K57.00: ICD-10-CM

## 2023-03-30 DIAGNOSIS — K57.92 DIVERTICULITIS: ICD-10-CM

## 2023-03-30 DIAGNOSIS — H53.8 VISION BLURRING: ICD-10-CM

## 2023-03-30 PROBLEM — R73.03 PREDIABETES: Status: ACTIVE | Noted: 2022-01-01

## 2023-03-30 PROBLEM — M54.9 UPPER BACK PAIN: Status: RESOLVED | Noted: 2022-10-25 | Resolved: 2023-03-30

## 2023-03-30 PROBLEM — E78.00 PURE HYPERCHOLESTEROLEMIA, UNSPECIFIED: Status: ACTIVE | Noted: 2022-01-01

## 2023-03-30 PROBLEM — Z91.81 HISTORY OF FALLING: Status: ACTIVE | Noted: 2022-01-01

## 2023-03-30 PROBLEM — F41.1 GENERALIZED ANXIETY DISORDER: Status: ACTIVE | Noted: 2022-01-01

## 2023-03-30 PROBLEM — K21.9 GASTRO-ESOPHAGEAL REFLUX DISEASE WITHOUT ESOPHAGITIS: Status: ACTIVE | Noted: 2022-01-01

## 2023-03-30 PROCEDURE — 99214 OFFICE O/P EST MOD 30 MIN: CPT | Performed by: FAMILY MEDICINE

## 2023-03-30 RX ORDER — ZOLPIDEM TARTRATE 10 MG/1
10 TABLET ORAL NIGHTLY PRN
Qty: 90 TABLET | Refills: 1 | Status: SHIPPED | OUTPATIENT
Start: 2023-03-30

## 2023-03-30 RX ORDER — TRAMADOL HYDROCHLORIDE 50 MG/1
50 TABLET ORAL 2 TIMES DAILY PRN
Qty: 60 TABLET | Refills: 1 | Status: SHIPPED | OUTPATIENT
Start: 2023-03-30

## 2023-06-28 ENCOUNTER — TELEPHONE (OUTPATIENT)
Dept: GASTROENTEROLOGY | Facility: CLINIC | Age: 63
End: 2023-06-28

## 2023-07-26 DIAGNOSIS — I10 ESSENTIAL HYPERTENSION: ICD-10-CM

## 2023-07-27 RX ORDER — TELMISARTAN 40 MG/1
40 TABLET ORAL DAILY
Qty: 90 TABLET | Refills: 3 | Status: SHIPPED | OUTPATIENT
Start: 2023-07-27

## 2023-07-27 NOTE — TELEPHONE ENCOUNTER
Please help patient schedule Medicare Annual Wellness visit this month or next -  they are overdue. Thank you.    WILL SEND RX ONCE SCHEDULED

## 2023-07-27 NOTE — TELEPHONE ENCOUNTER
Please review refill protocol failed/ no protocol  Requested Prescriptions   Pending Prescriptions Disp Refills    TELMISARTAN 40 MG Oral Tab [Pharmacy Med Name: TELMISARTAN 40 MG TABLET] 90 tablet 0     Sig: ADEBAYO GOOD TOSTEFANI GONZALEZS       Hypertensive Medications Protocol Failed - 7/26/2023 12:01 AM        Failed - Last BP reading less than 140/90     BP Readings from Last 1 Encounters:  03/30/23 : 143/67              Passed - In person appointment in the past 12 or next 3 months     Recent Outpatient Visits              3 months ago Hospital discharge follow-up    [address] [place], [place] [address]Akira MD    Office Visit    4 months ago Postoperative examination    [address] [place], [place] [address]Heidi MD    Office Visit    5 months ago Diverticular disease of large intestine with complication    [address], [address], Kaden Quiroz MD    Office Visit    5 months ago Diverticular disease of large intestine with complication    [address] [facility], Suzie Anderson MD    Office Visit    8 months ago [facility]                      Passed - CMP or BMP in past 6 months     Recent Results (from the past 4392 hour(s))   Basic Metabolic Panel (8)    Collection Time: 03/11/23  5:18 AM   Result Value Ref Range    Glucose 104 (H) 70 - 99 mg/dL    Sodium 143 136 - 145 mmol/L    Potassium 3.5 3.5 - 5.1 mmol/L    Chloride 111 98 - 112 mmol/L    CO2 26.0 21.0 - 32.0 mmol/L    Anion Gap 6 0 - 18 mmol/L    BUN 4 (L) 7 - 18 mg/dL    Creatinine 0.53 (L) 0.55 - 1.02 mg/dL    BUN/CREA Ratio 7.5 (L) 10.0 - 20.0    Calcium, Total 8.4 (L) 8.5 - 10.1 mg/dL    Calculated Osmolality 293 275 - 295 mOsm/kg    eGFR-Cr 103 >=60 mL/min/1.73m2     *Note: Due to a large number of results and/or encounters for the requested time period, some results have not been displayed. A complete set of results can be found in Results Review.                Passed - In person appointment or virtual visit in the past 6 months     Recent Outpatient Visits              3 months ago Hospital discharge follow-up    [address], Kip Yang MD    Office Visit    4 months ago Postoperative examination    Anastasia Ventura MD    Office Visit    5 months ago Diverticular disease of large intestine with complication    Laura Becker MD    Office Visit    5 months ago Diverticular disease of large intestine with complication    Laura Becker MD    Office Visit    8 months ago Post-menopausal    Kip Becker MD    Office Visit                      Passed - [facility] or GFRNAA > 50     GFR Evaluation  EGFRCR: 103 , resulted on 3/11/2023

## 2023-07-27 NOTE — TELEPHONE ENCOUNTER
Filled by MD, pharm to inform pt for rx p/u. Estrella Ortega        Future Appointments   Date Time Provider Callie Perez   9/30/2023  9:30 AM Lamar Lynn MD [facility]

## 2023-07-27 NOTE — TELEPHONE ENCOUNTER
Spoke, with the patient and informed her of the message below. Pt did schedule an appointment for her medicare physical on 9-30-23. This was the first Saturday available. Pt would like to know if the doctor can refill her medications.

## 2023-08-11 DIAGNOSIS — M19.90 OSTEOARTHRITIS, UNSPECIFIED OSTEOARTHRITIS TYPE, UNSPECIFIED SITE: ICD-10-CM

## 2023-08-11 NOTE — TELEPHONE ENCOUNTER
Patient called requesting a refill for the medication below stated only has four pills left of the medication below requesting a call when can      Current Outpatient Medications:       traMADol 50 MG Oral Tab, Take 1 tablet (50 mg total) by mouth 2 (two) times daily as needed. , Disp: 60 tablet, Rfl: 1

## 2023-08-12 NOTE — TELEPHONE ENCOUNTER
Review pended refill request as it does not fall under a protocol. Last Rx: 3/30/23  LOV-3/30/23    Requested Prescriptions   Pending Prescriptions Disp Refills    traMADol 50 MG Oral Tab 60 tablet 1     Sig: Take 1 tablet (50 mg total) by mouth 2 (two) times daily as needed.        There is no refill protocol information for this order

## 2023-08-13 DIAGNOSIS — E78.00 PURE HYPERCHOLESTEROLEMIA: ICD-10-CM

## 2023-08-14 RX ORDER — TRAMADOL HYDROCHLORIDE 50 MG/1
50 TABLET ORAL 2 TIMES DAILY PRN
Qty: 60 TABLET | Refills: 1 | Status: SHIPPED | OUTPATIENT
Start: 2023-08-14

## 2023-08-14 NOTE — TELEPHONE ENCOUNTER
Please review. Protocol failed / Has no protocol.      Requested Prescriptions   Pending Prescriptions Disp Refills    SIMVASTATIN 20 MG Oral Tab [Pharmacy Med Name: SIMVASTATIN 20 MG TABLET] 90 tablet 1     Sig: TOME CATHY TABLETA POR VIA ORAL TODOS LOS REAL AT NIGHT       Cholesterol Medication Protocol Failed - 8/13/2023  7:10 AM        Failed - LDL in past 12 months        Failed - Last LDL < 130     Lab Results   Component Value Date    LDL 91 04/12/2022             Passed - ALT in past 12 months        Passed - Last ALT < 80     Lab Results   Component Value Date    ALT 12 (L) 03/09/2023             Passed - In person appointment or virtual visit in the past 12 mos or appointment in next 3 mos     Recent Outpatient Visits              4 months ago [facility]    [address] [facility] Meagan [facility]    Office Visit    4 months ago Postoperative examination    Breann Crum MD    Office Visit    5 months ago Diverticular disease of large intestine with complication    [address], Papi Hodges MD    Office Visit    6 months ago Diverticular disease of large intestine with complication    Zeeshan Campbell, [address] [facility], Papi Hodges MD    Office Visit    8 months ago Post-menopausal    [address], Meagan Herman MD    Office Visit          Future Appointments         Provider Department Appt Notes    In 1 month MD Zeeshan Giron [address], [address] physical/last physical 5-73-27 policy informed to pt                  Future Appointments         Provider Department Appt Notes    In 1 month MD Zeeshan Giron [address], [address] physical/last physical 1-00-33 policy informed to pt           Recent Outpatient Visits 4 months ago Hospital discharge follow-up    Alva Carrasco MD    Office Visit    4 months ago Postoperative examination    Isaias Carrasco MD    Office Visit    5 months ago Diverticular disease of large intestine with complication    Ester Carrasco MD    Office Visit    6 months ago Diverticular disease of large intestine with complication    Ester Carrasco MD    Office Visit    8 months ago Alva Zuniga MD    Office Visit

## 2023-08-17 RX ORDER — SIMVASTATIN 20 MG
20 TABLET ORAL NIGHTLY
Qty: 90 TABLET | Refills: 1 | Status: SHIPPED | OUTPATIENT
Start: 2023-08-17

## 2023-09-11 ENCOUNTER — TELEPHONE (OUTPATIENT)
Facility: CLINIC | Age: 63
End: 2023-09-11

## 2023-09-11 DIAGNOSIS — Z86.010 HISTORY OF COLON POLYPS: ICD-10-CM

## 2023-09-11 DIAGNOSIS — Z12.11 COLON CANCER SCREENING: Primary | ICD-10-CM

## 2023-09-12 RX ORDER — POLYETHYLENE GLYCOL 3350, SODIUM CHLORIDE, SODIUM BICARBONATE, POTASSIUM CHLORIDE 420; 11.2; 5.72; 1.48 G/4L; G/4L; G/4L; G/4L
POWDER, FOR SOLUTION ORAL
Qty: 1 EACH | Refills: 0 | Status: SHIPPED | OUTPATIENT
Start: 2023-09-12

## 2023-09-13 DIAGNOSIS — M85.80 OSTEOPENIA, UNSPECIFIED LOCATION: ICD-10-CM

## 2023-09-13 RX ORDER — ALENDRONATE SODIUM 70 MG/1
70 TABLET ORAL WEEKLY
Qty: 12 TABLET | Refills: 3 | Status: SHIPPED | OUTPATIENT
Start: 2023-09-13

## 2023-09-30 ENCOUNTER — OFFICE VISIT (OUTPATIENT)
Dept: FAMILY MEDICINE CLINIC | Facility: CLINIC | Age: 63
End: 2023-09-30

## 2023-09-30 ENCOUNTER — LAB ENCOUNTER (OUTPATIENT)
Dept: LAB | Age: 63
End: 2023-09-30
Attending: FAMILY MEDICINE
Payer: MEDICARE

## 2023-09-30 VITALS
WEIGHT: 124 LBS | TEMPERATURE: 97 F | HEART RATE: 66 BPM | SYSTOLIC BLOOD PRESSURE: 126 MMHG | DIASTOLIC BLOOD PRESSURE: 83 MMHG | BODY MASS INDEX: 25 KG/M2

## 2023-09-30 DIAGNOSIS — F33.41 RECURRENT MAJOR DEPRESSIVE DISORDER, IN PARTIAL REMISSION (HCC): ICD-10-CM

## 2023-09-30 DIAGNOSIS — G56.00 CARPAL TUNNEL SYNDROME, UNSPECIFIED LATERALITY: ICD-10-CM

## 2023-09-30 DIAGNOSIS — E11.65 TYPE 2 DIABETES MELLITUS WITH HYPERGLYCEMIA, WITHOUT LONG-TERM CURRENT USE OF INSULIN (HCC): ICD-10-CM

## 2023-09-30 DIAGNOSIS — F41.1 GENERALIZED ANXIETY DISORDER: ICD-10-CM

## 2023-09-30 DIAGNOSIS — R73.03 PREDIABETES: ICD-10-CM

## 2023-09-30 DIAGNOSIS — E78.2 MIXED HYPERLIPIDEMIA: ICD-10-CM

## 2023-09-30 DIAGNOSIS — Z00.00 ENCOUNTER FOR ANNUAL HEALTH EXAMINATION: ICD-10-CM

## 2023-09-30 DIAGNOSIS — Z12.11 COLON CANCER SCREENING: ICD-10-CM

## 2023-09-30 DIAGNOSIS — M47.812 CERVICAL SPONDYLOSIS: ICD-10-CM

## 2023-09-30 DIAGNOSIS — M54.12 CERVICAL RADICULOPATHY: ICD-10-CM

## 2023-09-30 DIAGNOSIS — G47.00 INSOMNIA, UNSPECIFIED TYPE: ICD-10-CM

## 2023-09-30 DIAGNOSIS — Z00.00 ENCOUNTER FOR ANNUAL HEALTH EXAMINATION: Primary | ICD-10-CM

## 2023-09-30 DIAGNOSIS — I10 ESSENTIAL HYPERTENSION: ICD-10-CM

## 2023-09-30 DIAGNOSIS — K21.9 GASTRO-ESOPHAGEAL REFLUX DISEASE WITHOUT ESOPHAGITIS: ICD-10-CM

## 2023-09-30 DIAGNOSIS — Z23 NEEDS FLU SHOT: ICD-10-CM

## 2023-09-30 DIAGNOSIS — K57.20 PERFORATED DIVERTICULUM OF LARGE INTESTINE: ICD-10-CM

## 2023-09-30 DIAGNOSIS — M19.90 OSTEOARTHRITIS, UNSPECIFIED OSTEOARTHRITIS TYPE, UNSPECIFIED SITE: ICD-10-CM

## 2023-09-30 DIAGNOSIS — Z12.31 ENCOUNTER FOR SCREENING MAMMOGRAM FOR MALIGNANT NEOPLASM OF BREAST: ICD-10-CM

## 2023-09-30 DIAGNOSIS — M48.061 SPINAL STENOSIS OF LUMBAR REGION WITHOUT NEUROGENIC CLAUDICATION: ICD-10-CM

## 2023-09-30 DIAGNOSIS — F33.1 MODERATE EPISODE OF RECURRENT MAJOR DEPRESSIVE DISORDER (HCC): ICD-10-CM

## 2023-09-30 PROBLEM — K57.80 PERFORATED DIVERTICULUM: Status: RESOLVED | Noted: 2023-01-09 | Resolved: 2023-09-30

## 2023-09-30 PROBLEM — K57.00 PERFORATION OF JEJUNUM CONCURRENT WITH AND DUE TO DIVERTICULITIS: Status: RESOLVED | Noted: 2023-03-06 | Resolved: 2023-09-30

## 2023-09-30 PROBLEM — E78.00 PURE HYPERCHOLESTEROLEMIA, UNSPECIFIED: Status: RESOLVED | Noted: 2022-01-01 | Resolved: 2023-09-30

## 2023-09-30 LAB
ALBUMIN SERPL-MCNC: 3.9 G/DL (ref 3.4–5)
ALBUMIN/GLOB SERPL: 1.1 {RATIO} (ref 1–2)
ALP LIVER SERPL-CCNC: 48 U/L
ALT SERPL-CCNC: 19 U/L
ANION GAP SERPL CALC-SCNC: 6 MMOL/L (ref 0–18)
AST SERPL-CCNC: 16 U/L (ref 15–37)
BASOPHILS # BLD AUTO: 0.06 X10(3) UL (ref 0–0.2)
BASOPHILS NFR BLD AUTO: 1.6 %
BILIRUB SERPL-MCNC: 0.6 MG/DL (ref 0.1–2)
BUN BLD-MCNC: 7 MG/DL (ref 7–18)
CALCIUM BLD-MCNC: 9 MG/DL (ref 8.5–10.1)
CHLORIDE SERPL-SCNC: 107 MMOL/L (ref 98–112)
CHOLEST SERPL-MCNC: 194 MG/DL (ref ?–200)
CO2 SERPL-SCNC: 27 MMOL/L (ref 21–32)
CREAT BLD-MCNC: 0.72 MG/DL
CREAT UR-SCNC: 213 MG/DL
EGFRCR SERPLBLD CKD-EPI 2021: 93 ML/MIN/1.73M2 (ref 60–?)
EOSINOPHIL # BLD AUTO: 0.07 X10(3) UL (ref 0–0.7)
EOSINOPHIL NFR BLD AUTO: 1.9 %
ERYTHROCYTE [DISTWIDTH] IN BLOOD BY AUTOMATED COUNT: 13.2 %
EST. AVERAGE GLUCOSE BLD GHB EST-MCNC: 134 MG/DL (ref 68–126)
FASTING PATIENT LIPID ANSWER: YES
FASTING STATUS PATIENT QL REPORTED: YES
GLOBULIN PLAS-MCNC: 3.4 G/DL (ref 2.8–4.4)
GLUCOSE BLD-MCNC: 103 MG/DL (ref 70–99)
HBA1C MFR BLD: 6.3 % (ref ?–5.7)
HCT VFR BLD AUTO: 36.7 %
HDLC SERPL-MCNC: 89 MG/DL (ref 40–59)
HGB BLD-MCNC: 12.6 G/DL
IMM GRANULOCYTES # BLD AUTO: 0.01 X10(3) UL (ref 0–1)
IMM GRANULOCYTES NFR BLD: 0.3 %
LDLC SERPL CALC-MCNC: 89 MG/DL (ref ?–100)
LYMPHOCYTES # BLD AUTO: 1.31 X10(3) UL (ref 1–4)
LYMPHOCYTES NFR BLD AUTO: 35.2 %
MCH RBC QN AUTO: 30.1 PG (ref 26–34)
MCHC RBC AUTO-ENTMCNC: 34.3 G/DL (ref 31–37)
MCV RBC AUTO: 87.6 FL
MICROALBUMIN UR-MCNC: 1.13 MG/DL
MICROALBUMIN/CREAT 24H UR-RTO: 5.3 UG/MG (ref ?–30)
MONOCYTES # BLD AUTO: 0.33 X10(3) UL (ref 0.1–1)
MONOCYTES NFR BLD AUTO: 8.9 %
NEUTROPHILS # BLD AUTO: 1.94 X10 (3) UL (ref 1.5–7.7)
NEUTROPHILS # BLD AUTO: 1.94 X10(3) UL (ref 1.5–7.7)
NEUTROPHILS NFR BLD AUTO: 52.1 %
NONHDLC SERPL-MCNC: 105 MG/DL (ref ?–130)
OSMOLALITY SERPL CALC.SUM OF ELEC: 288 MOSM/KG (ref 275–295)
PLATELET # BLD AUTO: 280 10(3)UL (ref 150–450)
POTASSIUM SERPL-SCNC: 3.8 MMOL/L (ref 3.5–5.1)
PROT SERPL-MCNC: 7.3 G/DL (ref 6.4–8.2)
RBC # BLD AUTO: 4.19 X10(6)UL
SODIUM SERPL-SCNC: 140 MMOL/L (ref 136–145)
TRIGL SERPL-MCNC: 91 MG/DL (ref 30–149)
TSI SER-ACNC: 1.02 MIU/ML (ref 0.36–3.74)
VLDLC SERPL CALC-MCNC: 15 MG/DL (ref 0–30)
WBC # BLD AUTO: 3.7 X10(3) UL (ref 4–11)

## 2023-09-30 PROCEDURE — 83036 HEMOGLOBIN GLYCOSYLATED A1C: CPT

## 2023-09-30 PROCEDURE — 84443 ASSAY THYROID STIM HORMONE: CPT

## 2023-09-30 PROCEDURE — 85025 COMPLETE CBC W/AUTO DIFF WBC: CPT

## 2023-09-30 PROCEDURE — 36415 COLL VENOUS BLD VENIPUNCTURE: CPT

## 2023-09-30 PROCEDURE — 80053 COMPREHEN METABOLIC PANEL: CPT

## 2023-09-30 PROCEDURE — 82570 ASSAY OF URINE CREATININE: CPT

## 2023-09-30 PROCEDURE — 90686 IIV4 VACC NO PRSV 0.5 ML IM: CPT | Performed by: FAMILY MEDICINE

## 2023-09-30 PROCEDURE — G0008 ADMIN INFLUENZA VIRUS VAC: HCPCS | Performed by: FAMILY MEDICINE

## 2023-09-30 PROCEDURE — 80061 LIPID PANEL: CPT

## 2023-09-30 PROCEDURE — G0439 PPPS, SUBSEQ VISIT: HCPCS | Performed by: FAMILY MEDICINE

## 2023-09-30 PROCEDURE — 82043 UR ALBUMIN QUANTITATIVE: CPT

## 2023-09-30 RX ORDER — TRAMADOL HYDROCHLORIDE 50 MG/1
50 TABLET ORAL 2 TIMES DAILY PRN
Qty: 60 TABLET | Refills: 1 | Status: SHIPPED | OUTPATIENT
Start: 2023-09-30

## 2023-09-30 RX ORDER — SERTRALINE HYDROCHLORIDE 25 MG/1
25 TABLET, FILM COATED ORAL DAILY
Qty: 90 TABLET | Refills: 2 | Status: SHIPPED | OUTPATIENT
Start: 2023-09-30

## 2023-09-30 RX ORDER — ZOLPIDEM TARTRATE 10 MG/1
10 TABLET ORAL NIGHTLY PRN
Qty: 90 TABLET | Refills: 1 | Status: SHIPPED | OUTPATIENT
Start: 2023-09-30

## 2023-10-06 ENCOUNTER — TELEPHONE (OUTPATIENT)
Dept: FAMILY MEDICINE CLINIC | Facility: CLINIC | Age: 63
End: 2023-10-06

## 2023-10-06 NOTE — TELEPHONE ENCOUNTER
Patient called requesting a call back once provider views lab results 09/30. Requesting for when she is told her results for the records to be mailed to her.

## 2023-10-06 NOTE — TELEPHONE ENCOUNTER
Dr. Bernal Pair    Please see message below ,labs were completed  on 9/30 but not yet reviewed    Thank you

## 2023-10-09 RX ORDER — OMEPRAZOLE 40 MG/1
CAPSULE, DELAYED RELEASE ORAL
Qty: 90 CAPSULE | Refills: 3 | Status: SHIPPED | OUTPATIENT
Start: 2023-10-09

## 2023-10-09 NOTE — TELEPHONE ENCOUNTER
Please inform patient that her blood glucose is well controlled. She is not anemic. Cholesterol, thyroid, liver and kidney function are normal.  We can repeat testing next year    Component      Latest Ref Rng 9/30/2023   WBC      4.0 - 11.0 x10(3) uL 3.7 (L)    RBC      3.80 - 5.30 x10(6)uL 4.19    Hemoglobin      12.0 - 16.0 g/dL 12.6    Hematocrit      35.0 - 48.0 % 36.7    Platelet Count      215.4 - 450.0 10(3)uL 280.0    MCV      80.0 - 100.0 fL 87.6    MCH      26.0 - 34.0 pg 30.1    MCHC      31.0 - 37.0 g/dL 34.3    RDW      % 13.2    Prelim Neutrophil Abs      1.50 - 7.70 x10 (3) uL 1.94    Neutrophils Absolute      1.50 - 7.70 x10(3) uL 1.94    Lymphocytes Absolute      1.00 - 4.00 x10(3) uL 1.31    Monocytes Absolute      0.10 - 1.00 x10(3) uL 0.33    Eosinophils Absolute      0.00 - 0.70 x10(3) uL 0.07    Basophils Absolute      0.00 - 0.20 x10(3) uL 0.06    Immature Granulocyte Absolute      0.00 - 1.00 x10(3) uL 0.01    Neutrophils %      % 52.1    Lymphocytes %      % 35.2    Monocytes %      % 8.9    Eosinophils %      % 1.9    Basophils %      % 1.6    Immature Granulocyte %      % 0.3    Glucose      70 - 99 mg/dL 103 (H)    Sodium      136 - 145 mmol/L 140    Potassium      3.5 - 5.1 mmol/L 3.8    Chloride      98 - 112 mmol/L 107    Carbon Dioxide, Total      21.0 - 32.0 mmol/L 27.0    ANION GAP      0 - 18 mmol/L 6    BUN      7 - 18 mg/dL 7    CREATININE      0.55 - 1.02 mg/dL 0.72    CALCIUM      8.5 - 10.1 mg/dL 9.0    CALCULATED OSMOLALITY      275 - 295 mOsm/kg 288    EGFR      >=60 mL/min/1.73m2 93    AST (SGOT)      15 - 37 U/L 16    ALT (SGPT)      13 - 56 U/L 19    ALKALINE PHOSPHATASE      50 - 130 U/L 48 (L)    Total Bilirubin      0.1 - 2.0 mg/dL 0.6    PROTEIN, TOTAL      6.4 - 8.2 g/dL 7.3    Albumin      3.4 - 5.0 g/dL 3.9    Globulin      2.8 - 4.4 g/dL 3.4    A/G Ratio      1.0 - 2.0  1.1    Patient Fasting for CMP?  Yes    Cholesterol, Total      <200 mg/dL 194    HDL Cholesterol      40 - 59 mg/dL 89 (H)    Triglycerides      30 - 149 mg/dL 91    LDL Cholesterol Calc      <100 mg/dL 89    VLDL      0 - 30 mg/dL 15    NON-HDL CHOLESTEROL      <130 mg/dL 105    Patient Fasting for Lipid?  Yes    MALB URINE      mg/dL 1.13    CREATININE UR RANDOM      mg/dL 213.00    MALB/CRE CALC      <=30.0 ug/mg 5.3    HEMOGLOBIN A1c      <5.7 % 6.3 (H)    ESTIMATED AVERAGE GLUCOSE      68 - 126 mg/dL 134 (H)    TSH      0.358 - 3.740 mIU/mL 1.020       Legend:  (L) Low  (H) High

## 2023-10-09 NOTE — TELEPHONE ENCOUNTER
Refill passed per [facility] protocol.     Requested Prescriptions   Pending Prescriptions Disp Refills    OMEPRAZOLE 40 MG Oral Capsule Delayed Release [Pharmacy Med Name: OMEPRAZOLE DR 40 MG CAPSULE] 90 capsule 1     Sig: ADEBAYO MORGAN SUZIEKELLE BLAS LOS REAL       Gastrointestional Medication Protocol Passed - 10/7/2023  7:05 AM        Passed - In person appointment or virtual visit in the past 12 mos or appointment in next 3 mos     Recent Outpatient Visits              1 week ago Encounter for annual health examination    [address], Justice Duvall MD    Office Visit    6 months ago Hospital discharge follow-up    [address], Justice Duvall MD    Office Visit    6 months ago Postoperative examination    [address], Jason Joseph MD    Office Visit    7 months ago Diverticular disease of large intestine with complication    [address], Fariha Serrato MD    Office Visit    8 months ago Diverticular disease of large intestine with complication    [address], Gino Fisher MD    Office Visit          Future Appointments         Provider Department Appt Notes    In 1 week [facility], [address], [address] [facility] colon w/mac @ne                       Future Appointments         Provider Department Appt Notes    In 1 week [facility], [address], [address], [address] colon w/mac @ne          Recent Outpatient Visits              1 week ago Encounter for annual health examination    [address]    Office Visit    6 months ago Hospital discharge follow-up    [address] Medina MD    Office Visit    6 months ago Postoperative examination Maureen Medina MD    Office Visit    7 months ago Diverticular disease of large intestine with complication    [address] [facility], Shayla Quiroz MD    Office Visit    8 months ago Diverticular disease of large intestine with complication    [address], [address], Shayla Quiroz MD    Office Visit

## 2023-10-10 NOTE — TELEPHONE ENCOUNTER
Patient contacted (name and date of birth verified). Provider's results and recommendations reviewed with patient. Patient verbalizes understanding of the information, agrees with plan of care and offers no further questions at this time. Patient would like a hard copy. She does not look at [employer]. Generated lab letter and will forward to triage support to mail out letter.

## 2023-10-16 NOTE — PAT NURSING NOTE
Location, date and time of procedure verified with patient using the  who verbalized understanding. All questions answered at this time.

## 2023-10-20 ENCOUNTER — ANESTHESIA EVENT (OUTPATIENT)
Dept: ENDOSCOPY | Age: 63
End: 2023-10-20
Payer: MEDICARE

## 2023-10-20 ENCOUNTER — HOSPITAL ENCOUNTER (OUTPATIENT)
Age: 63
Setting detail: HOSPITAL OUTPATIENT SURGERY
Discharge: HOME | End: 2023-10-20
Attending: INTERNAL MEDICINE | Admitting: INTERNAL MEDICINE

## 2023-10-20 ENCOUNTER — ANESTHESIA (OUTPATIENT)
Dept: ENDOSCOPY | Age: 63
End: 2023-10-20
Payer: MEDICARE

## 2023-10-20 VITALS
WEIGHT: 124 LBS | DIASTOLIC BLOOD PRESSURE: 86 MMHG | OXYGEN SATURATION: 100 % | SYSTOLIC BLOOD PRESSURE: 164 MMHG | HEIGHT: 59 IN | HEART RATE: 52 BPM | RESPIRATION RATE: 12 BRPM | BODY MASS INDEX: 25 KG/M2

## 2023-10-20 DIAGNOSIS — Z86.010 HISTORY OF COLON POLYPS: ICD-10-CM

## 2023-10-20 DIAGNOSIS — Z12.11 COLON CANCER SCREENING: ICD-10-CM

## 2023-10-20 PROCEDURE — 88305 TISSUE EXAM BY PATHOLOGIST: CPT | Performed by: INTERNAL MEDICINE

## 2023-10-20 RX ORDER — SODIUM CHLORIDE, SODIUM LACTATE, POTASSIUM CHLORIDE, CALCIUM CHLORIDE 600; 310; 30; 20 MG/100ML; MG/100ML; MG/100ML; MG/100ML
INJECTION, SOLUTION INTRAVENOUS CONTINUOUS
Status: DISCONTINUED | OUTPATIENT
Start: 2023-10-20 | End: 2023-10-20

## 2023-10-20 RX ORDER — NALOXONE HYDROCHLORIDE 0.4 MG/ML
80 INJECTION, SOLUTION INTRAMUSCULAR; INTRAVENOUS; SUBCUTANEOUS AS NEEDED
OUTPATIENT
Start: 2023-10-20 | End: 2023-10-20

## 2023-10-20 RX ORDER — SODIUM CHLORIDE, SODIUM LACTATE, POTASSIUM CHLORIDE, CALCIUM CHLORIDE 600; 310; 30; 20 MG/100ML; MG/100ML; MG/100ML; MG/100ML
INJECTION, SOLUTION INTRAVENOUS CONTINUOUS
OUTPATIENT
Start: 2023-10-20

## 2023-10-20 RX ORDER — LIDOCAINE HYDROCHLORIDE 10 MG/ML
INJECTION, SOLUTION EPIDURAL; INFILTRATION; INTRACAUDAL; PERINEURAL AS NEEDED
Status: DISCONTINUED | OUTPATIENT
Start: 2023-10-20 | End: 2023-10-20 | Stop reason: SURG

## 2023-10-20 RX ADMIN — SODIUM CHLORIDE, SODIUM LACTATE, POTASSIUM CHLORIDE, CALCIUM CHLORIDE: 600; 310; 30; 20 INJECTION, SOLUTION INTRAVENOUS at 09:45:00

## 2023-10-20 RX ADMIN — SODIUM CHLORIDE, SODIUM LACTATE, POTASSIUM CHLORIDE, CALCIUM CHLORIDE: 600; 310; 30; 20 INJECTION, SOLUTION INTRAVENOUS at 09:57:00

## 2023-10-20 RX ADMIN — LIDOCAINE HYDROCHLORIDE 50 MG: 10 INJECTION, SOLUTION EPIDURAL; INFILTRATION; INTRACAUDAL; PERINEURAL at 09:36:00

## 2023-10-20 RX ADMIN — SODIUM CHLORIDE, SODIUM LACTATE, POTASSIUM CHLORIDE, CALCIUM CHLORIDE: 600; 310; 30; 20 INJECTION, SOLUTION INTRAVENOUS at 09:34:00

## 2023-10-20 NOTE — OPERATIVE REPORT
[facility] Endoscopy Report  Dated procedure-October 20, 2023    Preoperative Diagnosis:  -Colorectal cancer screening  -History of colon polyps        Postoperative Diagnosis:  -Colon polyps x5  -Diverticular disease  -Small tunnel hemorrhoids      Procedure:    Colonoscopy       Surgeon:  Ольга Cohen M.D. Anesthesia:  MAC     Technique:  After informed consent, the patient was placed in the left lateral recumbent position. Digital rectal examination revealed no palpable intraluminal abnormalities. An Olympus variable stiffness 190 series HD colonoscope was inserted into the rectum and advanced under direct vision by following the lumen to the cecum. The colon was examined upon withdrawal in the left lateral position. The procedure was well tolerated without immediate complication. Findings:  The preparation of the colon was good. The terminal ileum was examined for 4 cm and visually normal.  The ileocecal valve was well preserved. The visualized colonic mucosa from the cecum to the anal verge was normal with an intact vascular pattern. Colon polyps x5 removed as follows;  -Transverse x1, sessile 4 mm in size and removed by cold snare technique.  -Ascending x2, both polyps were sessile and ranged in size between 3 and 4 mm and removed by cold snare technique  -Sigmoid x1, diminutive removed with cold forceps technique. -Rectum x1, diminutive removed by cold forceps technique. All polypectomy sites inspected and found to be free bleeding specimens retrieved and sent for analysis. Diverticular disease located in the sigmoid descending and distal transverse, no diverticulitis. Small internal hemorrhoids noted on retroflexed view.     Estimated blood loss-insignificant  Specimens-see above    Impression:  -Colon polyps x5  -Diverticular disease  -Small tunnel hemorrhoids    Recommendations:  - Post polypectomy instructions given  - Repeat colonoscopy in 3- 5 years  - High fiber diet for diverticular disease  - Symptomatic treatment of hemorrhoids          Dary Brizuela.  Xavier Gale MD  10/20/2023  10:00 AM

## 2023-10-20 NOTE — DISCHARGE INSTRUCTIONS

## 2023-10-20 NOTE — ANESTHESIA POSTPROCEDURE EVALUATION
Patient: Vance Suarez    Procedure Summary       Date: 10/20/23 Room / Location: [facility] ENDOSCOPY 01 / [facility] ENDO    Anesthesia Start: 1285 Anesthesia Stop: 6034    Procedure: COLONOSCOPY Diagnosis:       Colon cancer screening      History of colon polyps      (colon polyps, diverticulosis, hemorrhoids)    Surgeons: Alessandra Christopher MD Anesthesiologist: Pedro Wolfe DO    Anesthesia Type: MAC ASA Status: 2            Anesthesia Type: MAC    Vitals Value Taken Time   /76 10/20/23 0959   Temp  10/20/23 1004   Pulse 59 10/20/23 0959   Resp 16 10/20/23 0959   SpO2 99 % 10/20/23 0959       [facility] AN Post Evaluation:   Patient Evaluated in PACU  Patient Participation: complete - patient participated  Level of Consciousness: awake  Pain Management: adequate  Airway Patency:patent  Dental exam unchanged from preop  Yes    Cardiovascular Status: acceptable  Respiratory Status: acceptable  Postoperative Hydration acceptable      GABRIEL THOMPSON DO  10/20/2023 10:04 AM

## 2023-10-20 NOTE — H&P
History & Physical Examination    Patient Name: Peri Rodarte  MRN: [id number]  CSN: [id number]  YOB: 1960    Diagnosis:    crc screening,z12.11 history of colon polyps z86.010     Omeprazole 40 MG Oral Capsule Delayed Release, TOME CATHY CAPSULA TODOS LOS REAL, Disp: 90 capsule, Rfl: 3  zolpidem 10 MG Oral Tab, Take 1 tablet (10 mg total) by mouth nightly as needed for Sleep. AT BEDTIME, Disp: 90 tablet, Rfl: 1  traMADol 50 MG Oral Tab, Take 1 tablet (50 mg total) by mouth 2 (two) times daily as needed. , Disp: 60 tablet, Rfl: 1  sertraline 25 MG Oral Tab, Take 1 tablet (25 mg total) by mouth daily. , Disp: 90 tablet, Rfl: 2, 10/19/2023  alendronate 70 MG Oral Tab, Take 1 tablet (70 mg total) by mouth once a week., Disp: 12 tablet, Rfl: 3, 10/19/2023  simvastatin 20 MG Oral Tab, Take 1 tablet (20 mg total) by mouth nightly. TOME CATHY TABLETA POR boca TODOS [place], Disp: 90 tablet, Rfl: 1, 10/19/2023  telmisartan 40 MG Oral Tab, Take 1 tablet (40 mg total) by mouth daily. , Disp: 90 tablet, Rfl: 3  gabapentin 300 MG Oral Cap, TAKE 2 CAPSULES BY MOUTH EVERY MORNING AND TAKE 3 CAPSULES BY MOUTH IN THE EVENING, Disp: 450 capsule, Rfl: 1, 10/19/2023  PEG 3350-KCl-Na Bicarb-NaCl (TRILYTE) 420 g Oral Recon Soln, Take prep as directed by gastro office. May substitute with Trilyte/generic equivalent if needed.  (Patient not taking: Reported on 9/30/2023), Disp: 1 each, Rfl: 0      lactated ringers infusion, , Intravenous, Continuous        Allergies:   Bee Pollen              UNKNOWN  Grass                   UNKNOWN  Pollen                  OTHER (SEE COMMENTS)    Comment:Nasal and throat congestion  Seasonal                  Ceftriaxone             RASH    Past Medical History:   Diagnosis Date    Anxiety state     Back problem     Calculus of kidney     Carpal tunnel syndrome     L & R    Cyst of finger 2013    R index    Depression     Diverticulosis of large intestine     Esophageal reflux Fibromyalgia     High blood pressure     High cholesterol     Obesity     Obstructive sleep apnea 04/11/2013    Prediabetes     Rotator cuff disorder     right shoulder    Sleep apnea     mild no machine    Visual impairment     readers     Past Surgical History:   Procedure Laterality Date    CARPAL TUNNEL RELEASE Bilateral 2013    COLONOSCOPY N/A 08/28/2018    Procedure: COLONOSCOPY;  Surgeon: Tia Boateng MD;  Location: [facility] ENDO    COLONOSCOPY      HYSTERECTOMY  2007    OTHER  2015    Right shoulder arthroscopically assisted rotator-cuff repair    OTHER SURGICAL HISTORY  03/08/2023    exploratory laparotomy, small bowel resection    REPAIR ROTATOR CUFF,ACUTE      VAGINAL HYSTERECTOMY  2007     Family History   Problem Relation Age of Onset    Diabetes Mother     Diabetes Brother      Social History    Tobacco Use      Smoking status: Never      Smokeless tobacco: Never    Alcohol use: No      Alcohol/week: 0.0 standard drinks of alcohol      SYSTEM Check if Review is Normal Check if Physical Exam is Normal If not normal, please explain:   HEENT [x ] [ x]    NECK & BACK [x ] [x ]    HEART [x ] [ x]    LUNGS [x ] [ x]    ABDOMEN [x ] [x ]    UROGENITAL [ ] [ ]    EXTREMITIES [x ] [x ]    OTHER        [ x ] I have discussed the risks and benefits and alternatives with the patient/family. They understand and agree to proceed with plan of care. [ x ] I have reviewed the History and Physical done within the last 30 days. Any changes noted above. Coni Baer.  Maria Luisa Valle MD  10/20/2023  9:27 AM

## 2023-10-24 ENCOUNTER — TELEPHONE (OUTPATIENT)
Dept: GASTROENTEROLOGY | Facility: CLINIC | Age: 63
End: 2023-10-24

## 2023-10-24 NOTE — TELEPHONE ENCOUNTER
#[id number]    Patient contacted, verified and test results from Dr. Naomi Mann given. Patient voiced understanding and no further questions at this time. Health maintenance updated. 5 year colonoscopy recall placed in patient outreach. Next due on 10/20/2028 per Dr. Naomi Mann.

## 2023-10-24 NOTE — TELEPHONE ENCOUNTER
----- Message from Crow Briscoe MD sent at 10/23/2023 12:03 PM CDT -----  I wanted to get back to you with your colonoscopy results. You had 5 colon polyps removed which were benign. I would advise a repeat colonoscopy in 5 years to make sure no new polyps are forming. You also have internal hemorrhoids and diverticulosis. Please stay on a high fiber diet and call with any questions.      RN to review with the pt /Sudanese speaking

## 2023-10-30 ENCOUNTER — MED REC SCAN ONLY (OUTPATIENT)
Facility: CLINIC | Age: 63
End: 2023-10-30

## 2024-01-03 DIAGNOSIS — E78.00 PURE HYPERCHOLESTEROLEMIA: ICD-10-CM

## 2024-01-03 DIAGNOSIS — M19.90 OSTEOARTHRITIS, UNSPECIFIED OSTEOARTHRITIS TYPE, UNSPECIFIED SITE: ICD-10-CM

## 2024-01-04 RX ORDER — SIMVASTATIN 20 MG
20 TABLET ORAL NIGHTLY
Qty: 90 TABLET | Refills: 3 | Status: SHIPPED | OUTPATIENT
Start: 2024-01-04

## 2024-01-04 RX ORDER — TRAMADOL HYDROCHLORIDE 50 MG/1
50 TABLET ORAL 2 TIMES DAILY PRN
Qty: 60 TABLET | Refills: 1 | Status: SHIPPED | OUTPATIENT
Start: 2024-01-04

## 2024-01-04 NOTE — TELEPHONE ENCOUNTER
Refill passed per [facility] protocol.  Requested Prescriptions   Pending Prescriptions Disp Refills    SIMVASTATIN 20 MG Oral Tab [Pharmacy Med Name: SIMVASTATIN 20 MG TABLET] 90 tablet 1     Sig: Take 1 tablet (20 mg total) by mouth nightly. ADEBAYO MORGAN TABLETA POR sharifa sutton nocjonathan       Cholesterol Medication Protocol Passed - 1/3/2024  2:27 PM        Passed - ALT in past 12 months        Passed - LDL in past 12 months        Passed - Last ALT < 80     Lab Results   Component Value Date    ALT 19 09/30/2023             Passed - Last LDL < 130     Lab Results   Component Value Date    LDL 89 09/30/2023             Passed - In person appointment or virtual visit in the past 12 mos or appointment in next 3 mos     Recent Outpatient Visits              3 months ago [facility]    9 months ago [facility] follow[facility]    9 months ago [facility]    10 months ago Diverticular disease of large intestine with complication    [facility]    [facility]    11 months ago Diverticular disease of large intestine with complication    [facility]    [facility]                           Recent Outpatient Visits              3 months ago Encounter for annual health examination    [facility]    [facility]    9 months ago [facility] discharge follow-up    [facility]    [facility] Visit    9 months ago Postoperative examination    [facility]  Margie, Gopi Simons MD    Office Visit    10 months ago Diverticular disease of large intestine with complication    [facility]    11 months ago Diverticular disease of large intestine with complication    [facility], Melony Meyers MD    Office Visit

## 2024-01-05 ENCOUNTER — TELEPHONE (OUTPATIENT)
Dept: FAMILY MEDICINE CLINIC | Facility: CLINIC | Age: 64
End: 2024-01-05

## 2024-01-05 NOTE — TELEPHONE ENCOUNTER
Pharmacy was called and it was verify that pt has refills on file and they will get it ready for pt for tomorrow.  Pt was called and made aware.

## 2024-01-05 NOTE — TELEPHONE ENCOUNTER
Patient requesting to get a refill for the Zolpidem medication, and she is almost out of it.     Current Outpatient Medications   Medication Sig Dispense Refill                         zolpidem 10 MG Oral Tab Take 1 tablet (10 mg total) by mouth nightly as needed for Sleep. AT BEDTIME 90 tablet 1

## 2024-01-18 ENCOUNTER — NURSE TRIAGE (OUTPATIENT)
Dept: FAMILY MEDICINE CLINIC | Facility: CLINIC | Age: 64
End: 2024-01-18

## 2024-01-18 NOTE — TELEPHONE ENCOUNTER
Action Requested: Summary for Provider     []  Critical Lab, Recommendations Needed  [] Need Additional Advice  []   FYI    []   Need Orders  [] Need Medications Sent to Pharmacy  []  Other     SUMMARY: Per Protocol disposition advised instructed on home care for hemorrhoids and informed to call back if not better in the next couple of days.     Reason for call: Rectal Pain  Onset: one week    Using language line : Lisa ID # [id number]     Patient (name and  verified) calling c/o with \"colon irritation\" and wants to know what she can do.  Patient is rectal burning. Patient c/o burning with bowel movements.  Denies any rectal bleeding.  Denies any abd pain.  Denies any diarrhea  Denies any constipation  Denies any vomiting  Denies any urinary symptoms  Reason for Disposition   Mild rectal pain    Protocols used: Rectal Symptoms-A-OH

## 2024-02-23 ENCOUNTER — TELEPHONE (OUTPATIENT)
Dept: FAMILY MEDICINE CLINIC | Facility: CLINIC | Age: 64
End: 2024-02-23

## 2024-03-12 ENCOUNTER — TELEPHONE (OUTPATIENT)
Dept: FAMILY MEDICINE CLINIC | Facility: CLINIC | Age: 64
End: 2024-03-12

## 2024-03-13 ENCOUNTER — NURSE TRIAGE (OUTPATIENT)
Dept: FAMILY MEDICINE CLINIC | Facility: CLINIC | Age: 64
End: 2024-03-13

## 2024-03-13 NOTE — TELEPHONE ENCOUNTER
Action Requested: Summary for Provider     []  Critical Lab, Recommendations Needed  [] Need Additional Advice  []   FYI    []   Need Orders  [] Need Medications Sent to Pharmacy  []  Other     SUMMARY: Per protocol: OV. Offered earlier appointment but patient only wants to see Dr. Smith. ER flags discussed.     Future Appointments   Date Time Provider Department Center   3/18/2024  3:00 PM [facility]   4/6/2024  1:20 PM [facility] RM1 ADO [facility] EM [place]     Reason for call: Low Back Pain  Onset: Data Unavailable    With language line  [facility] ID # [id number] patient states that for the last 2 weeks she has had left hip, back and leg pain. The pain starts in the back and radiates down the leg. She rates the pain a 9/10 but is taken down with pain medication that she takes for chronic pain.     Reason for Disposition   SEVERE back pain (e.g., excruciating, unable to do any normal activities) and not improved after pain medicine and CARE ADVICE    Protocols used: Back Pain-A-OH

## 2024-03-14 DIAGNOSIS — G89.29 CHRONIC LOW BACK PAIN WITHOUT SCIATICA, UNSPECIFIED BACK PAIN LATERALITY: ICD-10-CM

## 2024-03-14 DIAGNOSIS — M19.90 OSTEOARTHRITIS, UNSPECIFIED OSTEOARTHRITIS TYPE, UNSPECIFIED SITE: ICD-10-CM

## 2024-03-14 DIAGNOSIS — M54.50 CHRONIC LOW BACK PAIN WITHOUT SCIATICA, UNSPECIFIED BACK PAIN LATERALITY: ICD-10-CM

## 2024-03-14 DIAGNOSIS — G47.00 INSOMNIA, UNSPECIFIED TYPE: ICD-10-CM

## 2024-03-17 NOTE — TELEPHONE ENCOUNTER
Routed to Dr Smith for advise, thanks.  Last ref gabapentin 1-26-23 # 450 +1     1 1/26/2023       Refill Passed Per Protocol    Requested Prescriptions   Pending Prescriptions Disp Refills    ZOLPIDEM 10 MG Oral Tab [Pharmacy Med Name: ZOLPIDEM TARTRATE 10 MG TABLET] 90 tablet 1     Sig: Take 1 tablet (10 mg total) by mouth nightly as needed for Sleep. AT BEDTIME       Controlled Substance Medication Failed - 3/14/2024 12:21 PM        Failed - This medication is a controlled substance - forward to provider to refill          GABAPENTIN 300 MG Oral Cap [Pharmacy Med Name: GABAPENTIN 300 MG CAPSULE] 450 capsule 1     Sig: TAKE 2 CAPSULES BY MOUTH EVERY MORNING AND TAKE 3 CAPSULES BY MOUTH IN THE EVENING       Neurology Medications Passed - 3/14/2024 12:21 PM        Passed - In person appointment or virtual visit in the past 6 mos or appointment in next 3 mos     Recent Outpatient Visits              5 months ago Encounter for annual health examination    [facility]    Office Visit    11 months ago Hospital discharge follow-up    [facility] Sayda Smith MD    Office Visit    12 months ago Postoperative examination    [facility] Gopi Simons MD    Office Visit    1 year ago Diverticular disease of large intestine with complication    [facility]    1 year ago Diverticular disease of large intestine with complication    [facility]          Future Appointments         Provider Department Appt Notes    In 2 days Sayda Smith MD [facility] back pain radiating to left leg    In 3 weeks ADO DEXA RM1; ADO GABRIELLE RM1 [facility]                     Future  Appointments         Provider Department Appt Notes    In 2 days Sayda Smith MD [facility] back pain radiating to left leg    In 3 weeks ADO DEXA RM1; ADO GABRIELLE RM1 [facility]           Recent Outpatient Visits              5 months ago Encounter for annual health examination    [facility] Sayda Smith MD    Office Visit    11 months ago Hospital discharge follow-up    [facility] Sayda Smith MD    Office Visit    12 months ago [facility]operative [facility]    [facility]    1 year ago Diverticular disease of large intestine with complication    [facility]    Office Visit    1 year ago Diverticular disease of large intestine with complication    [facility], Melony Meyers MD    Office Visit

## 2024-03-18 ENCOUNTER — OFFICE VISIT (OUTPATIENT)
Dept: FAMILY MEDICINE CLINIC | Facility: CLINIC | Age: 64
End: 2024-03-18

## 2024-03-18 ENCOUNTER — HOSPITAL ENCOUNTER (OUTPATIENT)
Dept: GENERAL RADIOLOGY | Age: 64
Discharge: HOME | End: 2024-03-18
Attending: FAMILY MEDICINE
Payer: MEDICARE

## 2024-03-18 VITALS
HEART RATE: 66 BPM | BODY MASS INDEX: 25.2 KG/M2 | HEIGHT: 59 IN | DIASTOLIC BLOOD PRESSURE: 96 MMHG | WEIGHT: 125 LBS | SYSTOLIC BLOOD PRESSURE: 186 MMHG

## 2024-03-18 DIAGNOSIS — Z78.0 POST-MENOPAUSAL: ICD-10-CM

## 2024-03-18 DIAGNOSIS — M54.50 CHRONIC LOW BACK PAIN WITHOUT SCIATICA, UNSPECIFIED BACK PAIN LATERALITY: ICD-10-CM

## 2024-03-18 DIAGNOSIS — M54.2 NECK PAIN: ICD-10-CM

## 2024-03-18 DIAGNOSIS — G89.29 CHRONIC LOW BACK PAIN WITHOUT SCIATICA, UNSPECIFIED BACK PAIN LATERALITY: ICD-10-CM

## 2024-03-18 DIAGNOSIS — R03.0 ELEVATED BLOOD PRESSURE READING: Primary | ICD-10-CM

## 2024-03-18 DIAGNOSIS — Z23 NEED FOR SHINGLES VACCINE: ICD-10-CM

## 2024-03-18 DIAGNOSIS — Z23 NEED FOR PNEUMOCOCCAL VACCINE: ICD-10-CM

## 2024-03-18 DIAGNOSIS — R29.898 WEAKNESS OF BOTH LOWER EXTREMITIES: ICD-10-CM

## 2024-03-18 PROCEDURE — 72050 X-RAY EXAM NECK SPINE 4/5VWS: CPT | Performed by: FAMILY MEDICINE

## 2024-03-18 RX ORDER — GABAPENTIN 300 MG/1
CAPSULE ORAL
Qty: 450 CAPSULE | Refills: 3 | Status: SHIPPED | OUTPATIENT
Start: 2024-03-18

## 2024-03-18 RX ORDER — METHYLPREDNISOLONE 4 MG/1
TABLET ORAL
Qty: 1 EACH | Refills: 0 | Status: SHIPPED | OUTPATIENT
Start: 2024-03-18

## 2024-03-18 RX ORDER — ZOLPIDEM TARTRATE 10 MG/1
10 TABLET ORAL NIGHTLY PRN
Qty: 90 TABLET | Refills: 1 | Status: SHIPPED | OUTPATIENT
Start: 2024-03-18

## 2024-03-18 RX ORDER — ZOSTER VACCINE RECOMBINANT, ADJUVANTED 50 MCG/0.5
0.5 KIT INTRAMUSCULAR ONCE
Qty: 1 EACH | Refills: 0 | Status: SHIPPED | OUTPATIENT
Start: 2024-03-18 | End: 2024-03-18

## 2024-03-18 NOTE — PROGRESS NOTES
Chief Complaint   Patient presents with    Pain     Neck, shoulder, back and legs      HPI:   Jessy Reich is a 64 year old female who presents to clinic with complaints of back pain from her neck to her low back.   Majority of her pain is concentrated over her low back.  Pain is worsened with flexion and extension.  Worsens with any movement.  No obvious aggravating or relieving factors.  She has pain when she sits, stands and lays down.  Takes tramadol for her pain.    Blood pressure elevated today likely due to pain.  No chest pain shortness of breath or dizziness noted.    Last DEXA scan was over 5 years ago and showed osteopenia.  Patient taking Fosamax daily.  REVIEW OF SYSTEMS:   Negative, except per HPI.     EXAM:   BP (!) 186/96 (BP Location: Right arm, Patient Position: Sitting, Cuff Size: adult)   Pulse 66   Ht 4' 11\" (1.499 m)   Wt 125 lb (56.7 kg)   BMI 25.25 kg/m²   Body mass index is 25.25 kg/m².  GENERAL: well developed, well nourished, in no apparent distress  SKIN: no rashes, no suspicious lesions  HEENT: atraumatic, normocephalic  EYES: PERRLA, EOMI,conjunctiva are clear  NECK: supple, no adenopathy  MUSCULOSKELETAL: ttp over lumbar paraspinal and cervical muscles ( L>R) wo bony tenderness.     ASSESSMENT AND PLAN:     1. Elevated blood pressure reading  - bp elevated likely 2/2 pain     2. Chronic low back pain without sciatica, unspecified back pain laterality  - Physiatry Referral - [facility])  - MRI SPINE LUMBAR (CPT=72148); Future  - methylPREDNISolone (MEDROL) 4 MG Oral Tablet Therapy Pack; As directed.  Dispense: 1 each; Refill: 0    3. Post-menopausal  Cont fosmax  - XR DEXA BONE DENSITOMETRY (CPT=68480); Future    4. Weakness of both lower extremities  - MRI SPINE LUMBAR (CPT=72148); Future    5. Neck pain  - XR CERVICAL SPINE (4VIEWS) (CPT=26925); Future    6. Need for pneumococcal vaccine  - PCV20 VACCINE FOR INTRAMUSCULAR USE    7. Need for shingles  vaccine  - Zoster Vac Recomb Adjuvanted (SHINGRIX) 50 MCG/0.5ML Intramuscular Recon Susp; Inject 0.5 mL into the muscle one time for 1 dose.  Dispense: 1 each; Refill: 0     RTC if no improvement in symptoms. Red flags discussed to go to ROSIE Smith MD  3/18/2024  3:09 PM

## 2024-03-19 ENCOUNTER — TELEPHONE (OUTPATIENT)
Dept: FAMILY MEDICINE CLINIC | Facility: CLINIC | Age: 64
End: 2024-03-19

## 2024-03-19 DIAGNOSIS — M48.02 FORAMINAL STENOSIS OF CERVICAL REGION: Primary | ICD-10-CM

## 2024-03-19 DIAGNOSIS — M50.30 DEGENERATIVE DISC DISEASE, CERVICAL: ICD-10-CM

## 2024-03-19 DIAGNOSIS — R20.2 PARESTHESIAS: ICD-10-CM

## 2024-03-19 DIAGNOSIS — M79.18 MYALGIA OF MUSCLE OF NECK: ICD-10-CM

## 2024-03-19 RX ORDER — HYDROCODONE BITARTRATE AND ACETAMINOPHEN 5; 325 MG/1; MG/1
1 TABLET ORAL EVERY 8 HOURS PRN
Qty: 60 TABLET | Refills: 0 | Status: SHIPPED | OUTPATIENT
Start: 2024-03-19

## 2024-03-19 NOTE — TELEPHONE ENCOUNTER
Would have her continue taking the Medrol Dosepak in the morning and can take Norco for breakthrough pain.  [place] sent to pharmacy.  Her x-ray cervical spine shows arthritis in her neck as well as suggestion of pinched nerves.  MRI cervical spine is recommended.  I have ordered this as well.  She can schedule this along with her MRI lumbar spine.    Would rec she use PMR referral for follow up as well - she hasn't simba yet  thanks    1. Foraminal stenosis of cervical region  - MRI SPINE CERVICAL (CPT=72141); Future  - HYDROcodone-acetaminophen (NORCO) 5-325 MG Oral Tab; Take 1 tablet by mouth every 8 (eight) hours as needed for Pain.  Dispense: 60 tablet; Refill: 0

## 2024-03-19 NOTE — TELEPHONE ENCOUNTER
Using language line : Benitez ID number [id number]    Patient was transferred to triage asking about below. RN advised that message was sent to managed care department that would handle prior authorization for test. She would like to be notified once authorizations have been completed.       Patient is asking if this can be completed asap

## 2024-03-19 NOTE — TELEPHONE ENCOUNTER
Using language line : [employer]z ID number [id number]    Patient had visit 3/18/24, she states she was told she could request Norco if pain became worse. Patient states she would like prescription for Norco.    Verified preferred pharmacy and patient allergies.

## 2024-03-19 NOTE — TELEPHONE ENCOUNTER
: id [id number] Vicente    Patient asking for orders/referral for two tests from her insurance:  Bone Density   MRI lumbar spine    Call her with questions.  She was unable to schedule them.    Call pt when ready at:   [phone]

## 2024-03-19 NOTE — TELEPHONE ENCOUNTER
Patient notified of provider's recommendation.  Patient verbalized understanding.    Future Appointments   Date Time Provider Department Center   3/24/2024 12:45 PM [facility] MRI RM1 (1.5T WIDE) [facility] MRI EM [place]   3/24/2024  1:30 PM [facility] MRI RM1 (1.5T WIDE) [facility] MRI EM [place]   4/6/2024  1:20 PM [facility] ADO [facility]   4/23/2024  7:00 AM Behar, Alex, MD PM&R ADO  [facility]

## 2024-03-24 ENCOUNTER — HOSPITAL ENCOUNTER (OUTPATIENT)
Dept: MRI IMAGING | Age: 64
Discharge: HOME | End: 2024-03-24
Attending: FAMILY MEDICINE
Payer: MEDICARE

## 2024-03-24 ENCOUNTER — HOSPITAL ENCOUNTER (OUTPATIENT)
Dept: MRI IMAGING | Age: 64
End: 2024-03-24
Attending: FAMILY MEDICINE
Payer: MEDICARE

## 2024-03-24 DIAGNOSIS — G89.29 CHRONIC LOW BACK PAIN WITHOUT SCIATICA, UNSPECIFIED BACK PAIN LATERALITY: ICD-10-CM

## 2024-03-24 DIAGNOSIS — M79.18 MYALGIA OF MUSCLE OF NECK: ICD-10-CM

## 2024-03-24 DIAGNOSIS — M48.02 FORAMINAL STENOSIS OF CERVICAL REGION: ICD-10-CM

## 2024-03-24 DIAGNOSIS — R29.898 WEAKNESS OF BOTH LOWER EXTREMITIES: ICD-10-CM

## 2024-03-24 DIAGNOSIS — R20.2 PARESTHESIAS: ICD-10-CM

## 2024-03-24 DIAGNOSIS — M50.30 DEGENERATIVE DISC DISEASE, CERVICAL: ICD-10-CM

## 2024-03-24 DIAGNOSIS — M54.50 CHRONIC LOW BACK PAIN WITHOUT SCIATICA, UNSPECIFIED BACK PAIN LATERALITY: ICD-10-CM

## 2024-03-24 PROCEDURE — 72141 MRI NECK SPINE W/O DYE: CPT | Performed by: FAMILY MEDICINE

## 2024-03-24 PROCEDURE — 72148 MRI LUMBAR SPINE W/O DYE: CPT | Performed by: FAMILY MEDICINE

## 2024-04-06 ENCOUNTER — HOSPITAL ENCOUNTER (OUTPATIENT)
Dept: MAMMOGRAPHY | Age: 64
Discharge: HOME | End: 2024-04-06
Attending: FAMILY MEDICINE
Payer: MEDICARE

## 2024-04-06 DIAGNOSIS — Z12.31 ENCOUNTER FOR SCREENING MAMMOGRAM FOR MALIGNANT NEOPLASM OF BREAST: ICD-10-CM

## 2024-04-06 PROCEDURE — 77067 SCR MAMMO BI INCL CAD: CPT | Performed by: FAMILY MEDICINE

## 2024-04-06 PROCEDURE — 77063 BREAST TOMOSYNTHESIS BI: CPT | Performed by: FAMILY MEDICINE

## 2024-04-23 ENCOUNTER — HOSPITAL ENCOUNTER (OUTPATIENT)
Dept: GENERAL RADIOLOGY | Age: 64
Discharge: HOME | End: 2024-04-23
Attending: PHYSICAL MEDICINE & REHABILITATION
Payer: MEDICARE

## 2024-04-23 ENCOUNTER — TELEPHONE (OUTPATIENT)
Dept: PHYSICAL MEDICINE AND REHAB | Facility: CLINIC | Age: 64
End: 2024-04-23

## 2024-04-23 ENCOUNTER — OFFICE VISIT (OUTPATIENT)
Dept: PHYSICAL MEDICINE AND REHAB | Facility: CLINIC | Age: 64
End: 2024-04-23
Payer: MEDICARE

## 2024-04-23 VITALS — WEIGHT: 125 LBS | BODY MASS INDEX: 25.2 KG/M2 | HEIGHT: 59 IN

## 2024-04-23 DIAGNOSIS — M79.10 MYALGIA: ICD-10-CM

## 2024-04-23 DIAGNOSIS — M79.10 MYALGIA: Primary | ICD-10-CM

## 2024-04-23 DIAGNOSIS — M54.16 LUMBAR RADICULOPATHY: ICD-10-CM

## 2024-04-23 DIAGNOSIS — M51.36 BULGE OF LUMBAR DISC WITHOUT MYELOPATHY: ICD-10-CM

## 2024-04-23 DIAGNOSIS — M47.816 LUMBAR SPONDYLOSIS: ICD-10-CM

## 2024-04-23 DIAGNOSIS — M51.37 DDD (DEGENERATIVE DISC DISEASE), LUMBOSACRAL: ICD-10-CM

## 2024-04-23 DIAGNOSIS — M54.59 MECHANICAL LOW BACK PAIN: ICD-10-CM

## 2024-04-23 DIAGNOSIS — M48.061 LUMBAR FORAMINAL STENOSIS: ICD-10-CM

## 2024-04-23 DIAGNOSIS — M79.7 FIBROMYALGIA: ICD-10-CM

## 2024-04-23 DIAGNOSIS — M47.816 FACET SYNDROME, LUMBAR: ICD-10-CM

## 2024-04-23 DIAGNOSIS — F33.1 MODERATE EPISODE OF RECURRENT MAJOR DEPRESSIVE DISORDER (HCC): ICD-10-CM

## 2024-04-23 DIAGNOSIS — E78.5 HYPERLIPIDEMIA, UNSPECIFIED HYPERLIPIDEMIA TYPE: ICD-10-CM

## 2024-04-23 DIAGNOSIS — M47.816 LUMBAR SPONDYLOSIS: Primary | ICD-10-CM

## 2024-04-23 DIAGNOSIS — I10 ESSENTIAL HYPERTENSION: ICD-10-CM

## 2024-04-23 PROCEDURE — 72110 X-RAY EXAM L-2 SPINE 4/>VWS: CPT | Performed by: PHYSICAL MEDICINE & REHABILITATION

## 2024-04-23 PROCEDURE — 3008F BODY MASS INDEX DOCD: CPT | Performed by: PHYSICAL MEDICINE & REHABILITATION

## 2024-04-23 PROCEDURE — 99204 OFFICE O/P NEW MOD 45 MIN: CPT | Performed by: PHYSICAL MEDICINE & REHABILITATION

## 2024-04-23 RX ORDER — DULOXETIN HYDROCHLORIDE 30 MG/1
30 CAPSULE, DELAYED RELEASE ORAL DAILY
Qty: 30 CAPSULE | Refills: 2 | Status: SHIPPED | OUTPATIENT
Start: 2024-04-23

## 2024-04-23 NOTE — TELEPHONE ENCOUNTER
Initiated authorization for LEFT L5 and LEFT S1 TFESI CPT 22837, 33821 dx:M54.16 to be done at [facility] with [employer]  Status: Approved valid 4/23/24-7/22/24  Authorization #[id number]  Tracking #[id number]

## 2024-04-23 NOTE — PATIENT INSTRUCTIONS
1) Please begin physical therapy as soon as possible.   2) Take Naprosyn 500 mg 1 tablet twice per day with food for the next two weeks and then as needed but no more than 2 tablets per day. Do not take with any other NSAIDS (Ibuprofen, Advil, Aleve, etc). OK to take Tylenol 500 mg every 6 hours as needed for pain. If you develop any side effects including stomach aches, nausea, vomiting, or other gastrointestinal symptoms, stop the medication and call my office.   3) Tylenol 500-1000 mg every 6-8 hours as needed for pain.  No more than 3000 mg daily.  4) Start Duloxetine 30 mg daily. OK to take with Sertraline since it is such a low dose  5) Discontinue Tramadol and continue Norco. If duloxetine is not helpful, then we will restart tramadol and discontinue norco and Duloxetine  6) My office will call you to schedule the LEFt L5 and LEFt S1 TFESI under local anesthesia once the procedure is approved by your insurance carrier.    7) Please get X-rays of the Lumbar spine today on your way out.   8) Follow up with me 2 weeks after the procedure. This can be in the office or virtually.   9) Continue gabapenti 300 mg three times per day

## 2024-04-23 NOTE — H&P
[facility]  Clinic H&P    Requesting Physician: Sayda Smith MD    Chief Complaint (Reason for Visit):    Chief Complaint   Patient presents with    New Patient     New patient, referred by Dr. Smith, pt comes in with L side low back aching pain that radiates down L side low back. Intermittent T/N. Denies weakness. Rates pain 8/10. Completed MRI's. Denies PT. Has had pains for 10 years. Admits L-spine injection 6+ years ago without relief. Norco 5-325 BID, Gabapentin 600mg qDaily, 900mg qNightly.       History of Present Illness:  The patient is a 64 year old RIGHT handed female with significant past medical history of anxiety, depression, fibromyalgia, hypertension, hyperlipidemia, obesity, bilateral rotator cuff tear status post right rotator cuff repair in 2015  who presents with left sided low back pain with radiation down her left leg for several years. She believes this began as she was a . Her pain is an 8/10, aching, and radiates down the left leg to the achilles region and mildly down the right leg. Her pain is worse with sitting, laying on the left side, standing and bending forward, long periods of time. Her pain is improved with heat and laying supine. She has tried Tramadol and gabapentin which has been helpful. She is now on Norco 5 twice per day which has been helpful. She last had an injection several years ago at [facility]. She has some tingling in the left anterio lateral lower leg. She also has weakness in the legs to the point she has difficulty staying standing. She is not working anymore.     PAST MEDICAL HISTORY:   Past Medical History:    Anxiety state    Back problem    Calculus of kidney    Carpal tunnel syndrome    L & R    Cyst of finger    R index    Depression    Diverticulosis of large intestine    Esophageal reflux    Fibromyalgia    High blood pressure    High cholesterol    Obesity    Obstructive sleep  apnea    Prediabetes    Rotator cuff disorder    right shoulder    Sleep apnea    mild no machine    Visual impairment    readers       PAST SURGICAL HISTORY:   Past Surgical History:   Procedure Laterality Date    Carpal tunnel release Bilateral 2013    Colonoscopy N/A 08/28/2018    Procedure: COLONOSCOPY;  Surgeon: Rishabh Pinto MD;  Location: [facility] ENDO    Colonoscopy      Colonoscopy N/A 10/20/2023    Procedure: COLONOSCOPY;  Surgeon: Rishabh Pinto MD;  Location: [facility] ENDO    Hysterectomy  2007    Other  2015    Right shoulder arthroscopically assisted rotator-cuff repair    Other surgical history  03/08/2023    exploratory laparotomy, small bowel resection    Repair rotator cuff,acute      Vaginal hysterectomy  2007        FAMILY HISTORY:   Family History   Problem Relation Age of Onset    Diabetes Mother     Diabetes Brother        SOCIAL HISTORY:   Social History     Occupational History    Not on file   Tobacco Use    Smoking status: Never    Smokeless tobacco: Never   Vaping Use    Vaping status: Never Used   Substance and Sexual Activity    Alcohol use: No     Alcohol/week: 0.0 standard drinks of alcohol    Drug use: No    Sexual activity: Yes     Birth control/protection: Hysterectomy       CURRENT MEDICATIONS:   Current Outpatient Medications   Medication Sig Dispense Refill    DULoxetine 30 MG Oral Cap DR Particles Take 1 capsule (30 mg total) by mouth daily. 30 capsule 2    HYDROcodone-acetaminophen (NORCO) 5-325 MG Oral Tab Take 1 tablet by mouth every 8 (eight) hours as needed for Pain. 60 tablet 0    zolpidem 10 MG Oral Tab Take 1 tablet (10 mg total) by mouth nightly as needed for Sleep. AT BEDTIME 90 tablet 1    gabapentin 300 MG Oral Cap TAKE 2 CAPSULES BY MOUTH EVERY MORNING AND TAKE 3 CAPSULES BY MOUTH IN THE EVENING 450 capsule 3    simvastatin 20 MG Oral Tab Take 1 tablet (20 mg total) by mouth nightly. TOME CATHY TABLETA POR boca TODOS [place] 90 tablet 3    Omeprazole 40 MG Oral  Capsule Delayed Release TOME CATHY CAPSULA TODOS LOS REAL 90 capsule 3    sertraline 25 MG Oral Tab Take 1 tablet (25 mg total) by mouth daily. 90 tablet 2    alendronate 70 MG Oral Tab Take 1 tablet (70 mg total) by mouth once a week. 12 tablet 3    telmisartan 40 MG Oral Tab Take 1 tablet (40 mg total) by mouth daily. 90 tablet 3        ALLERGIES:   Allergies   Allergen Reactions    Bee Pollen UNKNOWN    Grass UNKNOWN    Pollen OTHER (SEE COMMENTS)     Nasal and throat congestion     Seasonal     Ceftriaxone RASH         REVIEW OF SYSTEMS:   Review of Systems   Constitutional: Negative.    HENT: Negative.    Eyes: Negative.    Respiratory: Negative.    Cardiovascular: Negative.    Gastrointestinal: Negative.    Genitourinary: Negative.    Musculoskeletal: As per HPI   Skin: Negative.    Neurological: As per HPI  Endo/Heme/Allergies: Negative.    Psychiatric/Behavioral: Negative.      All other systems reviewed and are negative. Pertinent positives and negatives noted in the HPI.        PHYSICAL EXAM:   Ht 59\"   Wt 125 lb (56.7 kg)   BMI 25.25 kg/m²     Body mass index is 25.25 kg/m².      General: No immediate distress  Head: Normocephalic/ Atraumatic  Eyes: Extra-occular movements intact.   Ears: No auricular hematoma or deformities  Mouth: No lesions or ulcerations  Heart: peripheral pulses intact. Normal capillary refill.   Lungs: Non-labored respirations  Abdomen: No abdominal guarding  Extremities: No lower extremity edema bilaterally   Skin: No lesions noted.   Cognition: alert & oriented x 3, attentive, able to follow 2 step commands, comprehention intact, spontaneous speech intact  Motor:    Musculoskeletal:    LUMBAR SPINE:  Inspection: no erythema, swelling, or obvious deformity.  Their iliac crest and shoulder heights are symmetrical.  Postural exam reveals no scoliosis or kyphosis.  Palpation: Tender to palpation midline lumbar spine as well as bilateral lower lumbar facets and paraspinals, bilateral  glutes and piriformis, bilateral SI joints and greater trochanter, bilateral IT band  ROM: Full active range of motion of the lumbar spine without pain  Motor: 5/5 in all myotomes of the BILATERAL lower extremities except 4 out of 5 during left great toe extension (L5)  Sensation: Intact to light touch in all dermatomes of the lower extremities   Reflexes: 2/4 at L4 and S1  Facet Loading: no specific facet pain  IAN: Negative  Straight leg raise: negative for radicular pain symptoms  Slump test: negative for pain symptoms or radicular pain symptoms      Gait:  Normal    Data  No visits with results within 6 Month(s) from this visit.   Latest known visit with results is:   Admission on 10/20/2023, Discharged on 10/20/2023   Component Date Value Ref Range Status    Case Report 10/20/2023    Final                    Value:Surgical Pathology                                Case: [id number]                                  Authorizing Provider:  Rishabh Pinto MD       Collected:           10/20/2023 09:43 AM          Ordering Location:     [facility]   Received:            10/20/2023 02:21 PM          Pathologist:           Mart Hess MD                                                        Specimens:   A) - Colon transverse, polyp x 1                                                                    B) - Colon ascending, polyp x 2                                                                     C) - Sigmoid colon, polyp x 1                                                                       D) - Rectum, polyp x 1                                                                     Final Diagnosis: 10/20/2023    Final                    Value:This result contains rich text formatting which cannot be displayed here.    Clinical Information 10/20/2023    Final                    Value:This result contains rich text formatting which cannot be displayed here.    Gross Description 10/20/2023     Final                    Value:This result contains rich text formatting which cannot be displayed here.    Interpretation 10/20/2023 Benign    Final   ]      Radiology Imaging:  I reviewed with the patient her MRI of the lumbar spine from 3/25/2024  PROCEDURE: MRI SPINE LUMBAR (CPT=72148)     COMPARISON: CT ABDOMEN PELVIS IV CONTRAST NO ORAL (ER), 3/06/2023, 8:44 PM.  [facility], MRI SPINE LUMBAR (CPT=72148), 6/24/2017, 2:42 PM.     INDICATIONS: Focal psychomotor deficit. Chronic low back pain. Weakness of both lower extremities (R29.898, G89.29, M54.50).     TECHNIQUE: A variety of imaging planes and parameters were utilized for visualization of suspected pathology.     FINDINGS:  NUMERATION: For the purposes of this examination, the lowest fully formed disc space is designated as L5-S1.  ALIGNMENT: There is preservation of the expected lumbar lordosis.  BONES: No fracture or suspicious osseous lesion is evident. A sclerotic lesion of the L3 vertebral segment appears indolent and may reflect a bone island. Multilevel anterior osteophyte formation is demonstrated.  CORD/CAUDA EQUINA: The conus medullaris terminates at the level of the L2 superior endplate. The distal cord and nerve roots have normal caliber, contour, and signal intensity.  PARASPINAL AREA: No visible mass.    OTHER:   Extrahepatic biliary dilatation is seen measuring up to 0.8 cm. Scattered colonic diverticula are present throughout the colon. There is no colonic wall thickening or pericolonic fat stranding in the imaged volume. Midline ventral abdominal  scarring is present. There is a small fat-containing umbilical hernia.     LUMBAR DISC LEVELS:  L1-L2: A mild, diffuse disc bulge is suggested. There is minimal hypertrophic facet arthrosis. No significant spinal canal or foraminal stenosis is seen.  L2-L3: Mild, diffuse disc bulge is appreciated. There is mild prominence of dorsal epidural fat. No significant spinal  canal or foraminal stenosis results.  L3-L4: A mild, diffuse disc bulge is present with right foraminal annular fissure. Mild hypertrophic facet arthrosis is seen with slight buckling of the ligamentum flavum. There is slight prominence of dorsal epidural fat contributing to a trefoil  configuration of the thecal sac results without substantial foraminal impingement.  L4-L5: A moderate, diffuse disc bulge is seen with superimposed broad-based bilateral foraminal protrusions. Hypertrophic facet arthrosis is seen with buckling of the ligamentum flavum. There is mild prominence of dorsal epidural fat. These findings  contribute to a trefoil configuration of the thecal sac with borderline bilateral foraminal narrowing.  L5-S1: Mild disc degeneration is suggested with superimposed broad-based right foraminal protrusion. There is trace hypertrophic facet arthrosis with minimal right-sided facet joint effusion. These findings contribute to borderline right foraminal  stenosis.                  Impression   CONCLUSION:  1. Mild degenerative changes of the lumbar spine with minimal foraminal narrowing as detailed above.     2. No acute osseous injury of the lumbar spine.       3. Scattered colonic diverticulosis partially visualized without MR evidence of acute complication in the imaged volume.       4. Lesser incidental findings as above.           Dictated by (CST): Low Liu MD on 3/25/2024 at 1:54 PM      Finalized by (CST): Low Liu MD on 3/25/2024 at 1:58 PM             ASSESSMENT AND PLAN:  Jessy is a 64-year-old female who presents with bilateral low back pain with radiation down the left leg and mildly down the right leg.  I believe her symptoms are due to lumbar spondylosis with facet arthropathy and a lumbar radiculopathy given her weakness during left great toe extension.  I also believe a component to her pain is her fibromyalgia.  I am recommending she begin physical therapy as soon as  possible, Naprosyn, and Tylenol.  I am also recommending a left L5 and left S1 transforaminal epidural steroid injection under local anesthesia.  She should get new x-rays of the lumbar spine.  I am recommending she continue gabapentin 300 mg 3 times per day.  I have also started her on duloxetine.  She is currently on sertraline 25 mg which is a very low dose so we will try an SNRI with it.  She will follow-up with me 2 weeks after the epidural steroid injection.       RTC in 2 weeks after injection    Discharge Instructions were provided as documented in AVS summary.  The patient was in agreement with the assessment and plan.  All questions were answered.  There were no barriers to learning.         1. Myalgia    2. Mechanical low back pain    3. Lumbar spondylosis    4. Facet syndrome, lumbar    5. DDD (degenerative disc disease), lumbosacral    6. Lumbar foraminal stenosis    7. Bulge of lumbar disc without myelopathy    8. Lumbar radiculopathy    9. Moderate episode of recurrent major depressive disorder (HCC)    10. Fibromyalgia    11. Hyperlipidemia, unspecified hyperlipidemia type    12. Essential hypertension        Alex B. Behar MD, [facility]  Physical Medicine and Rehabilitation/Sports Medicine  [facility]

## 2024-04-24 NOTE — TELEPHONE ENCOUNTER
Patient has been scheduled for Left L5 and S1 transforaminal epidural steroid injection on 5/15/24 at the [facility] with Dr. Behar.   -Anesthesia type:  Local  -Patient was advised that if he/she does receive the covid vaccine it needs to be at least 2 weeks before or after the injection.  -Medications and allergies reviewed.  -Patient reminded to hold NSAIDs (Ibuprofen, ASA 81, Aleve, Naproxen, Mobic, Diclofenac, Etodolac, Celebrex etc.) for 3 days prior to Lumbar MBB/Facet if BMI is greater than 35. For Cervical injections only hold multivitamins, Vitamin E, Fish Oil, Phentermine/Lomaira for 7 days prior to injection and NSAIDS.   mg to be held for 7 days prior to injections.  -If patient is receiving MAC/IVCS, weight loss oral/injectable medications will need to be held for 7 days prior to injection.  -Patient informed to fast 12 hours prior to procedure with IVCS/MAC.   -If on blood thinner, clearance has been received and approved to hold this medication by provider.   -Patient informed of [facility]'s  policy:  he/she will need a  to and from procedure and must be on site for their entirety of their visit, if their ride is unable to the procedure will be cancelled.   -[facility] is located in the [facility] 1st floor,  may park in the yellow/purple parking lot.  Patient verbalized understanding and agrees with plan.  Scheduled in Epic: Yes  Scheduled in Surgical Case: Yes  Follow up appointment made: NOV: 6/11/2024 Behar, Alex, MD

## 2024-04-25 DIAGNOSIS — M19.90 OSTEOARTHRITIS, UNSPECIFIED OSTEOARTHRITIS TYPE, UNSPECIFIED SITE: ICD-10-CM

## 2024-04-26 RX ORDER — TRAMADOL HYDROCHLORIDE 50 MG/1
50 TABLET ORAL 2 TIMES DAILY PRN
Qty: 60 TABLET | Refills: 1 | OUTPATIENT
Start: 2024-04-26

## 2024-05-02 ENCOUNTER — TELEPHONE (OUTPATIENT)
Dept: PHYSICAL MEDICINE AND REHAB | Facility: CLINIC | Age: 64
End: 2024-05-02

## 2024-05-02 NOTE — TELEPHONE ENCOUNTER
Attempted a call-unable to leave voicemail-voicemail not set up.    Sent MCM to request call back.    Since patient has not been on Duloxetine long, it is ok for her to stop it, however if stopping Duloxetine, must also stop Norco and switch back to Tramadol.  Need to discuss with patient and find out why she wishes to stop.  Is it not helping or is it giving her side effects?

## 2024-05-02 NOTE — TELEPHONE ENCOUNTER
Spoke with patient states would like to discuss with a RN how to discontinue Duloxetine 30mg daily.

## 2024-05-23 DIAGNOSIS — M50.30 DEGENERATIVE DISC DISEASE, CERVICAL: ICD-10-CM

## 2024-05-23 DIAGNOSIS — M79.18 MYALGIA OF MUSCLE OF NECK: ICD-10-CM

## 2024-05-23 DIAGNOSIS — M48.02 FORAMINAL STENOSIS OF CERVICAL REGION: ICD-10-CM

## 2024-05-23 DIAGNOSIS — R20.2 PARESTHESIAS: ICD-10-CM

## 2024-05-23 RX ORDER — HYDROCODONE BITARTRATE AND ACETAMINOPHEN 5; 325 MG/1; MG/1
1 TABLET ORAL EVERY 8 HOURS PRN
Qty: 60 TABLET | Refills: 0 | Status: SHIPPED | OUTPATIENT
Start: 2024-05-23

## 2024-05-23 NOTE — TELEPHONE ENCOUNTER
Language Line  Javier id [id number]    Patient out of Rx: Hydrocodone. Can Dr Smith refill to verified pharmacy:    [id number] - [place], [place] - [address] across from [address], [phone], [phone]   [address] [address]   Phone: [phone] Fax: [phone]

## 2024-05-23 NOTE — TELEPHONE ENCOUNTER
Protocol Failed; No Protocol   Recent fills: Quantity:60   03/19/2024                                                                      Patient is due   Last Rx written: 03/19/2024  Last Office Visit: 03/18/2024

## 2024-05-31 ENCOUNTER — TELEPHONE (OUTPATIENT)
Dept: PHYSICAL THERAPY | Facility: HOSPITAL | Age: 64
End: 2024-05-31

## 2024-06-04 ENCOUNTER — OFFICE VISIT (OUTPATIENT)
Dept: PHYSICAL THERAPY | Age: 64
End: 2024-06-04
Attending: PHYSICAL MEDICINE & REHABILITATION
Payer: MEDICARE

## 2024-06-04 DIAGNOSIS — M54.50 LOWER BACK PAIN: Primary | ICD-10-CM

## 2024-06-04 PROCEDURE — 97110 THERAPEUTIC EXERCISES: CPT

## 2024-06-04 PROCEDURE — 97161 PT EVAL LOW COMPLEX 20 MIN: CPT

## 2024-06-04 PROCEDURE — 97530 THERAPEUTIC ACTIVITIES: CPT

## 2024-06-04 NOTE — PROGRESS NOTES
SPINE EVALUATION:     Diagnosis:   LBP      Referring Provider: Alex Behar  Date of Evaluation:    6/4/2024    Precautions:  OA Next MD visit:   none scheduled  Date of Surgery: n/a     PATIENT SUMMARY   Jessy Reich is a 64 year old female who presents to therapy today with complaints of having lbp for many years.  Pt describes pain level current 6/10, at best 5/10, at worst 8/10.   Current functional limitations include walking, prolonged sitting, standing, stairs negotiation and lifting.     Jessy describes prior level of function was independent with her ADLs and IADLs. Pt goals include decreasing her pain.  Past medical history was reviewed with Jessy. No significant findings at this time  Pt denies diplopia, dysarthria, dysphasia, dizziness, drop attacks, bowel/bladder changes, saddle anesthesia, and JOHN LE N/T.    ASSESSMENT  Jessy presents to physical therapy evaluation with primary c/o lbp. The results of the objective tests and measures show decreased trom, tightness and weakness.  Functional deficits include but are not limited to prolonged act.  Signs and symptoms are consistent with diagnosis of back pain. Pt and PT discussed evaluation findings, pathology, POC and HEP.  Pt voiced understanding and performs HEP correctly without reported pain. Skilled Physical Therapy is medically necessary to address the above impairments and reach functional goals.     OBJECTIVE:   Observation/Posture: hip er and right trunk lean.  Neuro Screen: intact john.    Trunk AROM: (* denotes performed with pain)  Flexion: 50%  Extension: 25%  Sidebending: R 50%; L 50%  Rotation: R 50%; L 50%    Accessory motion: Lumbar hypomobility with central and uni. PAS; +2 grades.  Palpation: Left lbp.    Strength: (* denotes performed with pain)  LE   Hip flexion (L2): R 4+/5; L 4/5  Hip abduction: R 4+/5; L 4/5  Hip Extension: R 4+/5; L 4/5   Hip ER: R 4+/5; L 4/5  Hip IR: R 4+/5; L 4/5  Knee Flexion: R 4+/5; L  4/5   Knee extension (L3): R 4+/5; L 4/5   DF (L4): R 4+/5; L 4/5  Great Toe Ext (L5): R 4+/5, L 4/5  PF (S1): R 4+/5; L 4/5     Flexibility:   LE   Hip Flexor: R Tight, L Tight  Hamstrings: R Tight; L Tight  Piriformis: R Tight; L Tight  Quads: R Tight; L Tight  Gastroc-soleus: R Tight; L Tight     Special tests:   Positive for left slump test and negative at the right.    Gait: pt ambulates on level ground with antalgia and right trunk lean .  Balance: SLS R 7, L 7; Poor plus balance.    Today’s Treatment and Response:   Pt education was provided on exam findings, treatment diagnosis, treatment plan, expectations, and prognosis. Pt was also provided recommendations for activity modifications, postural corrections, and ergonomics  Patient was instructed in and issued a HEP for: Ther. Ex. For 10'- Trunk Rolls 20xea.          Left HS St. With pump 2x30\"          PPT 20x3\"                    Ther. Act. For 10'- Discussed her condition, pt expectations and prognosis.    Charges: 1PT Eval Low Complexity, 1TE and 1TA      Total Timed Treatment: 20 min     Total Treatment Time: 45 min     Based on clinical rationale and outcome measures, this evaluation involved Low Complexity decision making.  PLAN OF CARE:    Goals: (to be met in 10 visits)   .Pt will improve transversus abdominis recruitment to perform proper isometric contraction without requiring verbal or tactile cuing to promote advancement of therex   Pt will demonstrate good understanding of proper posture and body mechanics to decrease pain and improve spinal safety   Pt will improve lumbar spine AROM flexion to touch her toes allow increase ease with bending forward to don shoes   Pt will report improved symptom centralization and absence of radicular symptoms for 3 consecutive days to improve function with ADL   Pt will have decreased paraspinal mm tension to tolerate standing >10 minutes for work and home activities   Pt will demonstrate improved core  strength to be able to perform gait with <1/10 pain   Pt will be independent and compliant with comprehensive HEP to maintain progress achieved in PT       Frequency / Duration: Patient will be seen for 2 x/week or a total of 10 visits over a 90 day period. Treatment will include: Gait training, Manual Therapy, Mechanical Traction, Neuromuscular Re-education, Therapeutic Activities, Therapeutic Exercise, Home Exercise Program instruction, and Modalities to include: Electrical stimulation (unattended)    Education or treatment limitation: None  Rehab Potential:fair    Oswestry Disability Index Score  No data recorded    Patient/Family/Caregiver was advised of these findings, precautions, and treatment options and has agreed to actively participate in planning and for this course of care.    Thank you for your referral. Please co-sign or sign and return this letter via fax as soon as possible to [phone]. If you have any questions, please contact me at Dept: [phone]    Sincerely,  Electronically signed by therapist: Kwame Navarrete, PT, DPT, CSCS    Physician's certification required: Yes  I certify the need for these services furnished under this plan of treatment and while under my care.    X___________________________________________________ Date____________________    Certification From: 6/4/2024  To:9/2/2024

## 2024-06-07 ENCOUNTER — TELEPHONE (OUTPATIENT)
Dept: FAMILY MEDICINE CLINIC | Facility: CLINIC | Age: 64
End: 2024-06-07

## 2024-06-07 DIAGNOSIS — M54.50 CHRONIC LOW BACK PAIN WITHOUT SCIATICA, UNSPECIFIED BACK PAIN LATERALITY: Primary | ICD-10-CM

## 2024-06-07 DIAGNOSIS — G89.29 CHRONIC LOW BACK PAIN WITHOUT SCIATICA, UNSPECIFIED BACK PAIN LATERALITY: Primary | ICD-10-CM

## 2024-06-07 NOTE — TELEPHONE ENCOUNTER
Patient is requesting referral.     Name of specialist and specialty department : Dr. Alex Behar , Physical Medicine   Reason for visit with the specialist: Follow up for back pain issue, injections. Will need a few visits   Address of the specialist office: [address] [place] [address] [facility] [address]  Appointment date: 6/11/2024         CSS informed patient the turnaround time for referral is 5-7 business days.  Patient was informed to check their "[employer] account for referral status.

## 2024-06-07 NOTE — TELEPHONE ENCOUNTER
1. Chronic low back pain without sciatica, unspecified back pain laterality  Has already been seeing MD Behar.  Received injections end of may  Needs further visits on referral.  Covering for juli referral signed.   - Physiatry Referral - In Network    MAGI Cano

## 2024-06-07 NOTE — TELEPHONE ENCOUNTER
Pended referral. Please review diagnosis and sign off if you agree.    Thank you,  iFona   [facility] [phone]

## 2024-06-10 ENCOUNTER — TELEPHONE (OUTPATIENT)
Dept: FAMILY MEDICINE CLINIC | Facility: CLINIC | Age: 64
End: 2024-06-10

## 2024-06-10 NOTE — TELEPHONE ENCOUNTER
Spoke to patient and informed her she is due for Medicare AHA and DM f/u. Patient prefers to call back as she currently has many appointments. Patient will call once she is able to schedule.

## 2024-06-11 ENCOUNTER — OFFICE VISIT (OUTPATIENT)
Dept: PHYSICAL THERAPY | Age: 64
End: 2024-06-11
Attending: PHYSICAL MEDICINE & REHABILITATION
Payer: MEDICARE

## 2024-06-11 ENCOUNTER — TELEPHONE (OUTPATIENT)
Dept: PHYSICAL MEDICINE AND REHAB | Facility: CLINIC | Age: 64
End: 2024-06-11

## 2024-06-11 ENCOUNTER — OFFICE VISIT (OUTPATIENT)
Dept: PHYSICAL MEDICINE AND REHAB | Facility: CLINIC | Age: 64
End: 2024-06-11
Payer: MEDICARE

## 2024-06-11 VITALS
HEART RATE: 79 BPM | WEIGHT: 125 LBS | HEIGHT: 59 IN | BODY MASS INDEX: 25.2 KG/M2 | RESPIRATION RATE: 18 BRPM | OXYGEN SATURATION: 99 %

## 2024-06-11 DIAGNOSIS — M19.90 OSTEOARTHRITIS, UNSPECIFIED OSTEOARTHRITIS TYPE, UNSPECIFIED SITE: ICD-10-CM

## 2024-06-11 DIAGNOSIS — M54.59 MECHANICAL LOW BACK PAIN: ICD-10-CM

## 2024-06-11 DIAGNOSIS — M54.50 CHRONIC LOW BACK PAIN WITHOUT SCIATICA, UNSPECIFIED BACK PAIN LATERALITY: ICD-10-CM

## 2024-06-11 DIAGNOSIS — M79.7 FIBROMYALGIA: ICD-10-CM

## 2024-06-11 DIAGNOSIS — M54.50 LOWER BACK PAIN: Primary | ICD-10-CM

## 2024-06-11 DIAGNOSIS — G89.29 CHRONIC LOW BACK PAIN WITHOUT SCIATICA, UNSPECIFIED BACK PAIN LATERALITY: ICD-10-CM

## 2024-06-11 DIAGNOSIS — M51.36 BULGE OF LUMBAR DISC WITHOUT MYELOPATHY: ICD-10-CM

## 2024-06-11 DIAGNOSIS — I10 ESSENTIAL HYPERTENSION: ICD-10-CM

## 2024-06-11 DIAGNOSIS — M79.18 MYALGIA OF MUSCLE OF NECK: ICD-10-CM

## 2024-06-11 DIAGNOSIS — M47.816 FACET SYNDROME, LUMBAR: Primary | ICD-10-CM

## 2024-06-11 DIAGNOSIS — M47.816 LUMBAR SPONDYLOSIS: Primary | ICD-10-CM

## 2024-06-11 DIAGNOSIS — M47.816 FACET SYNDROME, LUMBAR: ICD-10-CM

## 2024-06-11 DIAGNOSIS — R20.2 PARESTHESIAS: ICD-10-CM

## 2024-06-11 DIAGNOSIS — F33.1 MODERATE EPISODE OF RECURRENT MAJOR DEPRESSIVE DISORDER (HCC): ICD-10-CM

## 2024-06-11 DIAGNOSIS — M48.02 FORAMINAL STENOSIS OF CERVICAL REGION: ICD-10-CM

## 2024-06-11 DIAGNOSIS — M79.10 MYALGIA: ICD-10-CM

## 2024-06-11 DIAGNOSIS — M54.16 LUMBAR RADICULOPATHY: ICD-10-CM

## 2024-06-11 DIAGNOSIS — E78.5 HYPERLIPIDEMIA, UNSPECIFIED HYPERLIPIDEMIA TYPE: ICD-10-CM

## 2024-06-11 DIAGNOSIS — M50.30 DEGENERATIVE DISC DISEASE, CERVICAL: ICD-10-CM

## 2024-06-11 DIAGNOSIS — R10.2 PELVIC PAIN: ICD-10-CM

## 2024-06-11 DIAGNOSIS — M51.37 DDD (DEGENERATIVE DISC DISEASE), LUMBOSACRAL: ICD-10-CM

## 2024-06-11 DIAGNOSIS — M48.061 LUMBAR FORAMINAL STENOSIS: ICD-10-CM

## 2024-06-11 PROCEDURE — 3008F BODY MASS INDEX DOCD: CPT | Performed by: PHYSICAL MEDICINE & REHABILITATION

## 2024-06-11 PROCEDURE — 97110 THERAPEUTIC EXERCISES: CPT

## 2024-06-11 PROCEDURE — 97112 NEUROMUSCULAR REEDUCATION: CPT

## 2024-06-11 PROCEDURE — 99214 OFFICE O/P EST MOD 30 MIN: CPT | Performed by: PHYSICAL MEDICINE & REHABILITATION

## 2024-06-11 PROCEDURE — 99499 UNLISTED E&M SERVICE: CPT | Performed by: PHYSICAL MEDICINE & REHABILITATION

## 2024-06-11 RX ORDER — DULOXETIN HYDROCHLORIDE 30 MG/1
30 CAPSULE, DELAYED RELEASE ORAL DAILY
Qty: 30 CAPSULE | Refills: 2 | Status: SHIPPED | OUTPATIENT
Start: 2024-06-11

## 2024-06-11 RX ORDER — GABAPENTIN 300 MG/1
CAPSULE ORAL
Qty: 450 CAPSULE | Refills: 3 | Status: SHIPPED | OUTPATIENT
Start: 2024-06-11

## 2024-06-11 NOTE — PATIENT INSTRUCTIONS
1) My office will call you to schedule the BILATERAL L4-L5 and L5-S1 facet joint injections under local anesthesia once the procedure is approved by your insurance carrier.    2) Continue with physical therapy and try some pelvic floor therapy  3) Continue gabapentin, Duloxetine, and Norco daily.   4) Follow up with me 2 weeks after the procedure. This can be in the office or virtually.

## 2024-06-11 NOTE — PROGRESS NOTES
Diagnosis:   LBP      Referring Provider: Alex Behar  Date of Evaluation:    6/4/24    Precautions:   OA Next MD visit:   none scheduled  Date of Surgery: n/a   Insurance Primary/Secondary: [employer] [facility] / N/A     # Auth Visits: 10            Subjective: Patient reports continued lbp this evening. She has been doing her HEP and felt ok after her last visit.    Pain: 6-7/10      Objective:   Observation/Posture: hip er and right trunk lean.  Neuro Screen: intact freda.     Trunk AROM: (* denotes performed with pain)  Flexion: 50%  Extension: 25%  Sidebending: R 50%; L 50%  Rotation: R 50%; L 50%     Accessory motion: Lumbar hypomobility with central and uni. PAS; +2 grades.  Palpation: Left lbp.     Strength: (* denotes performed with pain)  LE   Hip flexion (L2): R 4+/5; L 4/5  Hip abduction: R 4+/5; L 4/5  Hip Extension: R 4+/5; L 4/5            Hip ER: R 4+/5; L 4/5  Hip IR: R 4+/5; L 4/5  Knee Flexion: R 4+/5; L 4/5            Knee extension (L3): R 4+/5; L 4/5            DF (L4): R 4+/5; L 4/5  Great Toe Ext (L5): R 4+/5, L 4/5  PF (S1): R 4+/5; L 4/5      Flexibility:   LE   Hip Flexor: R Tight, L Tight  Hamstrings: R Tight; L Tight  Piriformis: R Tight; L Tight  Quads: R Tight; L Tight  Gastroc-soleus: R Tight; L Tight      Special tests:   Positive for left slump test and negative at the right.     Gait: pt ambulates on level ground with antalgia and right trunk lean .  Balance: SLS R 7, L 7; Poor plus balance.      Assessment: Patient presents with left le tightness during her manual stretching and neuro mob. The patient does tolerate extension based exercises with a decrease of pain from a 7/10 down to a 2/10.      Goals: (to be met in 10 visits)   .Pt will improve transversus abdominis recruitment to perform proper isometric contraction without requiring verbal or tactile cuing to promote advancement of therex   Pt will demonstrate good understanding of proper posture and body mechanics to decrease pain and  improve spinal safety   Pt will improve lumbar spine AROM flexion to touch her toes allow increase ease with bending forward to don shoes   Pt will report improved symptom centralization and absence of radicular symptoms for 3 consecutive days to improve function with ADL   Pt will have decreased paraspinal mm tension to tolerate standing >10 minutes for work and home activities   Pt will demonstrate improved core strength to be able to perform gait with <1/10 pain   Pt will be independent and compliant with comprehensive HEP to maintain progress achieved in PT     Plan: Plan to work on trom, stretching and strengthening.  Date: 6/11/2024  TX#: 2/10 Date:                 TX#: 3/ Date:                 TX#: 4/ Date:                 TX#: 5/ Date:   Tx#: 6/   Ther. Ex.: 25'  Nu-Step 8' L5  Left Pfs and Glut. St. 2x30\"xea.  Left HS with pump 2x30\"  Left Trunk Rolls 2x20  WILDER 3'       Neuro Re-ed.: 20'  Bridges 2x20  Prone Hip Ext. 20xea.  Prone Swim 20x  PPU 2x10       Modalities:   Ice at the lower back for 10' unbilled after her treatment in wilder position.       HEP: Reviewed her original HEP.    Charges: 2TE and 1Neuro       Total Timed Treatment: 45 min  Total Treatment Time: 45 min

## 2024-06-11 NOTE — TELEPHONE ENCOUNTER
Estimated body mass index is 25.25 kg/m² as calculated from the following:    Height as of an earlier encounter on 6/11/24: 59\".    Weight as of an earlier encounter on 6/11/24: 125 lb.    Patient has been scheduled for bilateral L4-L5 and L5-S1 facet joint injections under local anesthesia on 6/19/24 at the [facility] with Dr. Behar.   Anesthesia type:  Local  Please note: The [facility] will call the business day prior to discuss the exact time/arrival and additional instructions for your appointment.  Patient was advised that if he/she does receive the covid vaccine it needs to be at least 2 weeks before or after the injection.  Medications and allergies reviewed.  Educated to hold NSAIDS (Aleve, Ibuprofen, Motrin, Advil) and anti-inflammatories (Meloxicam, Naproxen, Diclofenac, Celebrex) and for cervical injections must hold Multi-Vitamins, Vitamin E, Fish Oil/Omega-3.  If patient is receiving MAC/IVCS, weight loss oral/injectable medications will need to be held for 7 days prior to injection.  Patient informed to fast 8 hours prior to procedure and 10-12 hours prior to procedure with IVCS/MAC if patient is on a weight loss medication.   If on blood thinner, clearance has been received and approved to hold this medication by provider.   Patient informed of [facility]'s  policy:  he/she will need a  to and from procedure and must be on site for their entirety of their visit, if their ride is unable to the procedure will be cancelled.   [facility] is located in the [facility] [address] [facility] [address], [place] [address].   may park in the yellow/purple parking lot.  Patient verbalized understanding and agrees with plan.  Scheduled in Epic: Yes  Scheduled in Surgical Case: Yes  Follow up appointment made: NOV: Visit date not found

## 2024-06-11 NOTE — PROGRESS NOTES
[facility]  Progress Note    CHIEF COMPLAINT:    Chief Complaint   Patient presents with    Follow - Up     Pt is coming in to F/U on neck pain, Pt states that she is also having bilateral buttock pain, Pt states that she would like to defer her focus to her tailbone pain, Pain 6/10 states that it hard for her to sit        History of Present Illness:  The patient is a 64 year old  RIGHT handed female with significant past medical history of anxiety, depression, fibromyalgia, hypertension, hyperlipidemia, obesity, bilateral rotator cuff tear status post right rotator cuff repair in 2015  who presents for follow-up of her left-sided low back pain and radicular symptoms down the left leg status post left L5 and left S1 transforaminal epidural steroid injection on 5/29/2024.  She has improved by about 50% as a pertains to the leg pain but continues to have bilateral low back pain.  She rates the discomfort about a 6 out of 10 and mostly in the buttock.  She has had 1 session of physical therapy.  She has been taking Norco in the afternoon as well as gabapentin 300 mg 3 times per day and duloxetine 30 mg daily.  She just has mild discomfort in the left leg now.    PAST MEDICAL HISTORY:  Past Medical History:    Anxiety state    Back problem    Calculus of kidney    Carpal tunnel syndrome    L & R    Cyst of finger    R index    Depression    Diverticulosis of large intestine    Esophageal reflux    Fibromyalgia    High blood pressure    High cholesterol    Obesity    Obstructive sleep apnea    Prediabetes    Rotator cuff disorder    right shoulder    Sleep apnea    mild no machine    Visual impairment    readers       SURGICAL HISTORY:  Past Surgical History:   Procedure Laterality Date    Carpal tunnel release Bilateral 2013    Colonoscopy N/A 08/28/2018    Procedure: COLONOSCOPY;  Surgeon: Rishabh Pinto MD;  Location: [facility]    Colonoscopy      Colonoscopy N/A  10/20/2023    Procedure: COLONOSCOPY;  Surgeon: Rishabh Pinto MD;  Location: [facility]    Hysterectomy  2007    Other  2015    Right shoulder arthroscopically assisted rotator-cuff repair    Other surgical history  03/08/2023    exploratory laparotomy, small bowel resection    Repair rotator cuff,acute      Vaginal hysterectomy  2007       SOCIAL HISTORY:   Social History     Occupational History    Not on file   Tobacco Use    Smoking status: Never    Smokeless tobacco: Never   Vaping Use    Vaping status: Never Used   Substance and Sexual Activity    Alcohol use: No     Alcohol/week: 0.0 standard drinks of alcohol    Drug use: No    Sexual activity: Yes     Birth control/protection: Hysterectomy       FAMILY HISTORY:   Family History   Problem Relation Age of Onset    Diabetes Mother     Diabetes Brother        CURRENT MEDICATIONS:   Current Outpatient Medications   Medication Sig Dispense Refill    DULoxetine 30 MG Oral Cap DR Particles Take 1 capsule (30 mg total) by mouth daily. 30 capsule 2    gabapentin 300 MG Oral Cap TAKE 2 CAPSULES BY MOUTH EVERY MORNING AND TAKE 3 CAPSULES BY MOUTH IN THE EVENING 450 capsule 3    HYDROcodone-acetaminophen (NORCO) 5-325 MG Oral Tab Take 1 tablet by mouth every 8 (eight) hours as needed for Pain. 60 tablet 0    Cholecalciferol (VITAMIN D) 50 MCG (2000 UT) Oral Tab Take by mouth.      Multiple Vitamins-Minerals (MULTI-VITAMIN/MINERALS) Oral Tab Take 1 tablet by mouth daily.      zolpidem 10 MG Oral Tab Take 1 tablet (10 mg total) by mouth nightly as needed for Sleep. AT BEDTIME 90 tablet 1    simvastatin 20 MG Oral Tab Take 1 tablet (20 mg total) by mouth nightly. TOME CATHY TABLETA POR boca TODOS las noches 90 tablet 3    Omeprazole 40 MG Oral Capsule Delayed Release TOME CATHY CAPSULA TODOS LOS REAL 90 capsule 3    sertraline 25 MG Oral Tab Take 1 tablet (25 mg total) by mouth daily. 90 tablet 2    alendronate 70 MG Oral Tab Take 1 tablet (70 mg total) by mouth once a  week. 12 tablet 3    telmisartan 40 MG Oral Tab Take 1 tablet (40 mg total) by mouth daily. 90 tablet 3       ALLERGIES:   Allergies   Allergen Reactions    Bee Pollen UNKNOWN    Grass UNKNOWN    Pollen OTHER (SEE COMMENTS)     Nasal and throat congestion     Seasonal     Ceftriaxone RASH       REVIEW OF SYSTEMS:   Review of Systems   Constitutional: Negative.    HENT: Negative.    Eyes: Negative.    Respiratory: Negative.    Cardiovascular: Negative.    Gastrointestinal: Negative.    Genitourinary: Negative.    Musculoskeletal: As per HPI  Skin: Negative.    Neurological: As per HPI  Endo/Heme/Allergies: Negative.    Psychiatric/Behavioral: Negative.      All other systems reviewed and are negative. Pertinent positives and negatives noted in the HPI.        PHYSICAL EXAM:   Pulse 79   Resp 18   Ht 59\"   Wt 125 lb (56.7 kg)   SpO2 99%   BMI 25.25 kg/m²     Body mass index is 25.25 kg/m².      General: No immediate distress  Head: Normocephalic/ Atraumatic  Eyes: Extra-occular movements intact.   Ears: No auricular hematoma or deformities  Mouth: No lesions or ulcerations  Heart: peripheral pulses intact. Normal capillary refill.   Lungs: Non-labored respirations  Abdomen: No abdominal guarding  Extremities: No lower extremity edema bilaterally   Skin: No lesions noted.   Cognition: alert & oriented x 3, attentive, able to follow 2 step commands, comprehention intact, spontaneous speech intact  Motor:    Musculoskeletal:    LUMBAR SPINE:  Inspection: no erythema, swelling, or obvious deformity.  Their iliac crest and shoulder heights are symmetrical.  Postural exam reveals no scoliosis or kyphosis.  Palpation: Tender to palpation midline lumbar spine as well as bilateral lower lumbar facets and paraspinals, bilateral glutes and piriformis, bilateral SI joints and greater trochanter, bilateral IT band  ROM: Full active range of motion of the lumbar spine without pain  Motor: 5/5 in all myotomes of the BILATERAL  lower extremities except 4 out of 5 during left great toe extension (L5)  Sensation: Intact to light touch in all dermatomes of the lower extremities   Reflexes: 2/4 at L4 and S1  Facet Loading: no specific facet pain  IAN: Negative  Straight leg raise: negative for radicular pain symptoms  Slump test: negative for pain symptoms or radicular pain symptoms        Gait:  Normal    Data  No visits with results within 6 Month(s) from this visit.   Latest known visit with results is:   Admission on 10/20/2023, Discharged on 10/20/2023   Component Date Value Ref Range Status    Case Report 10/20/2023    Final                    Value:Surgical Pathology                                Case: [id number]                                  Authorizing Provider:  Rishabh Pinto MD       Collected:           10/20/2023 09:43 AM          Ordering Location:     [facility]   Received:            10/20/2023 02:21 PM          Pathologist:           Mart Hess MD                                                        Specimens:   A) - Colon transverse, polyp x 1                                                                    B) - Colon ascending, polyp x 2                                                                     C) - Sigmoid colon, polyp x 1                                                                       D) - Rectum, polyp x 1                                                                     Final Diagnosis: 10/20/2023    Final                    Value:This result contains rich text formatting which cannot be displayed here.    Clinical Information 10/20/2023    Final                    Value:This result contains rich text formatting which cannot be displayed here.    Gross Description 10/20/2023    Final                    Value:This result contains rich text formatting which cannot be displayed here.    Interpretation 10/20/2023 Benign    Final   ]      Radiology Imaging:  I reviewed  with the patient her X-ray and MRI of the lumbar spine   XR LUMBAR SPINE AP LAT FLEX EXT (CPT=72110)  Narrative: PROCEDURE: XR LUMBAR SPINE AP LAT FLEX EXT EM (CPT=72120)     COMPARISON: None     INDICATIONS: Chronic bilateral low back and leg pain.     TECHNIQUE: Lumbar spine radiographs, 4 views (AP, lateral, flexion, and extension views)       FINDINGS:       ALIGNMENT:   Anatomic.  VERTEBRAL BODIES:   Vertebral bodies are intact.  L3 bone island.  DISC SPACES: Small endplate osteophytes throughout the lumbar spine and lower thoracic spine..  Mild narrowing of the L2-3 disc.  Other disc spaces are relatively preserved.  SACROILIAC JOINTS: Normal.    FLEXION/EXTENSION VIEWS:   Restricted range of motion.  No subluxation during flexion and extension.    OTHER:   Atherosclerotic vascular calcification              Impression: CONCLUSION:   1. Multilevel degenerative disc disease/spondylosis most pronounced at L3-4.           Dictated by (CST): Randy Dhaliwal MD on 4/23/2024 at 9:31 PM       Finalized by (CST): Randy Dhaliwal MD on 4/23/2024 at 9:34 PM            PROCEDURE: MRI SPINE LUMBAR (CPT=72148)     COMPARISON: CT ABDOMEN PELVIS IV CONTRAST NO ORAL (ER), 3/06/2023, 8:44 PM.  [facility], MRI SPINE LUMBAR (CPT=72148), 6/24/2017, 2:42 PM.     INDICATIONS: Focal psychomotor deficit. Chronic low back pain. Weakness of both lower extremities (R29.898, G89.29, M54.50).     TECHNIQUE: A variety of imaging planes and parameters were utilized for visualization of suspected pathology.     FINDINGS:  NUMERATION: For the purposes of this examination, the lowest fully formed disc space is designated as L5-S1.  ALIGNMENT: There is preservation of the expected lumbar lordosis.  BONES: No fracture or suspicious osseous lesion is evident. A sclerotic lesion of the L3 vertebral segment appears indolent and may reflect a bone island. Multilevel anterior osteophyte formation is demonstrated.  CORD/CAUDA  EQUINA: The conus medullaris terminates at the level of the L2 superior endplate. The distal cord and nerve roots have normal caliber, contour, and signal intensity.  PARASPINAL AREA: No visible mass.    OTHER:   Extrahepatic biliary dilatation is seen measuring up to 0.8 cm. Scattered colonic diverticula are present throughout the colon. There is no colonic wall thickening or pericolonic fat stranding in the imaged volume. Midline ventral abdominal  scarring is present. There is a small fat-containing umbilical hernia.     LUMBAR DISC LEVELS:  L1-L2: A mild, diffuse disc bulge is suggested. There is minimal hypertrophic facet arthrosis. No significant spinal canal or foraminal stenosis is seen.  L2-L3: Mild, diffuse disc bulge is appreciated. There is mild prominence of dorsal epidural fat. No significant spinal canal or foraminal stenosis results.  L3-L4: A mild, diffuse disc bulge is present with right foraminal annular fissure. Mild hypertrophic facet arthrosis is seen with slight buckling of the ligamentum flavum. There is slight prominence of dorsal epidural fat contributing to a trefoil  configuration of the thecal sac results without substantial foraminal impingement.  L4-L5: A moderate, diffuse disc bulge is seen with superimposed broad-based bilateral foraminal protrusions. Hypertrophic facet arthrosis is seen with buckling of the ligamentum flavum. There is mild prominence of dorsal epidural fat. These findings  contribute to a trefoil configuration of the thecal sac with borderline bilateral foraminal narrowing.  L5-S1: Mild disc degeneration is suggested with superimposed broad-based right foraminal protrusion. There is trace hypertrophic facet arthrosis with minimal right-sided facet joint effusion. These findings contribute to borderline right foraminal  stenosis.                  Impression   CONCLUSION:  1. Mild degenerative changes of the lumbar spine with minimal foraminal narrowing as detailed  above.     2. No acute osseous injury of the lumbar spine.       3. Scattered colonic diverticulosis partially visualized without MR evidence of acute complication in the imaged volume.       4. Lesser incidental findings as above.           Dictated by (CST): Low Liu MD on 3/25/2024 at 1:54 PM      Finalized by (CST): Low Liu MD on 3/25/2024 at 1:58 PM       ASSESSMENT AND PLAN:  Jessy is a pleasant 64-year-old female presents for follow-up of her bilateral low back and buttock pain.  I have performed a left L5 and left S1 transforaminal epidural injection which improved her symptoms as a pertains to the left leg.  She continues to have bilateral low back pain and buttock pain which I believe is due to to partly her fibromyalgia versus facet arthropathy versus pelvic floor dysfunction.  I have reviewed her physical therapy notes with Ankur.  I am recommending she continue with physical therapy and I have also discussed her case with Ankur and recommend that she have some pelvic floor therapy.  I am also recommending bilateral L4-L5 and L5-S1 facet joint injections under local anesthesia.  We will call her to schedule these.  She should continue her Norco 5/325 daily and continue the gabapentin 300 mg 3 times per day and duloxetine 30 mg daily.  I will follow-up with her 2 weeks after the procedure.       RTC in 2 weeks after procedure  Discharge Instructions were provided as documented in AVS summary.  The patient was in agreement with the assessment and plan.  All questions were answered.  There were no barriers to learning.         1. Lumbar spondylosis    2. Lumbar radiculopathy    3. Myalgia    4. Mechanical low back pain    5. Facet syndrome, lumbar    6. DDD (degenerative disc disease), lumbosacral    7. Lumbar foraminal stenosis    8. Bulge of lumbar disc without myelopathy    9. Moderate episode of recurrent major depressive disorder (HCC)    10. Hyperlipidemia, unspecified hyperlipidemia  type    11. Essential hypertension    12. Pelvic pain    13. Fibromyalgia    14. Chronic low back pain without sciatica, unspecified back pain laterality    15. Osteoarthritis, unspecified osteoarthritis type, unspecified site    16. Foraminal stenosis of cervical region    17. Degenerative disc disease, cervical    18. Myalgia of muscle of neck    19. Paresthesias        Alex B. Behar MD  Physical Medicine and Rehabilitation/Sports Medicine  [facility]

## 2024-06-11 NOTE — TELEPHONE ENCOUNTER
Initiated authorization for BILATERAL L4-L5 and L5-S1 facet joint injections CPT 16153-10, 64494 x2 dx:M47.816 to be done at [facility] with [facility]  Status: Approved valid 6/11/24-9/11/24  Authorization #[id number]  Tracking #[id number]

## 2024-06-18 ENCOUNTER — OFFICE VISIT (OUTPATIENT)
Dept: PHYSICAL THERAPY | Age: 64
End: 2024-06-18
Attending: PHYSICAL MEDICINE & REHABILITATION

## 2024-06-18 DIAGNOSIS — F33.41 RECURRENT MAJOR DEPRESSIVE DISORDER, IN PARTIAL REMISSION (HCC): ICD-10-CM

## 2024-06-18 DIAGNOSIS — M54.50 LOWER BACK PAIN: Primary | ICD-10-CM

## 2024-06-18 PROCEDURE — 97110 THERAPEUTIC EXERCISES: CPT

## 2024-06-18 PROCEDURE — 97112 NEUROMUSCULAR REEDUCATION: CPT

## 2024-06-18 NOTE — PROGRESS NOTES
Diagnosis:   LBP      Referring Provider: Alex Behar  Date of Evaluation:    6/4/24    Precautions:   OA Next MD visit:   none scheduled  Date of Surgery: n/a   Insurance Primary/Secondary: [employer] [facility] / N/A     # Auth Visits: 10            Subjective: Patient reports continued lbp this evening. She has been doing her HEP and felt ok after her last visit.    Pain: 6-7/10      Objective:   Observation/Posture: hip er and right trunk lean.  Neuro Screen: intact freda.     Trunk AROM: (* denotes performed with pain)  Flexion: 50%  Extension: 25%  Sidebending: R 50%; L 50%  Rotation: R 50%; L 50%     Accessory motion: Lumbar hypomobility with central and uni. PAS; +2 grades.  Palpation: Left lbp.     Strength: (* denotes performed with pain)  LE   Hip flexion (L2): R 4+/5; L 4/5  Hip abduction: R 4+/5; L 4/5  Hip Extension: R 4+/5; L 4/5            Hip ER: R 4+/5; L 4/5  Hip IR: R 4+/5; L 4/5  Knee Flexion: R 4+/5; L 4/5            Knee extension (L3): R 4+/5; L 4/5            DF (L4): R 4+/5; L 4/5  Great Toe Ext (L5): R 4+/5, L 4/5  PF (S1): R 4+/5; L 4/5      Flexibility:   LE   Hip Flexor: R Tight, L Tight  Hamstrings: R Tight; L Tight  Piriformis: R Tight; L Tight  Quads: R Tight; L Tight  Gastroc-soleus: R Tight; L Tight      Special tests:   Positive for left slump test and negative at the right.     Gait: pt ambulates on level ground with antalgia and right trunk lean .  Balance: SLS R 7, L 7; Poor plus balance.      Assessment: Patient presents with left le tightness during her manual stretching and neuro mob. The patient does tolerate extension based exercises well. Held left hip stretches secondary to pain her last visit.      Goals: (to be met in 10 visits)   .Pt will improve transversus abdominis recruitment to perform proper isometric contraction without requiring verbal or tactile cuing to promote advancement of therex   Pt will demonstrate good understanding of proper posture and body mechanics to decrease  pain and improve spinal safety   Pt will improve lumbar spine AROM flexion to touch her toes allow increase ease with bending forward to don shoes   Pt will report improved symptom centralization and absence of radicular symptoms for 3 consecutive days to improve function with ADL   Pt will have decreased paraspinal mm tension to tolerate standing >10 minutes for work and home activities   Pt will demonstrate improved core strength to be able to perform gait with <1/10 pain   Pt will be independent and compliant with comprehensive HEP to maintain progress achieved in PT     Plan: Plan to work on trom, stretching and strengthening.  Date: 6/11/2024  TX#: 2/10 Date:  6/18/24              TX#: 3/10 Date:                 TX#: 4/ Date:                 TX#: 5/ Date:   Tx#: 6/   Ther. Ex.: 25'  Nu-Step 8' L5  Left Pfs and Glut. St. 2x30\"xea.  Left HS with pump 2x30\"  Left Trunk Rolls 2x20  WILDER 3' Ther. Ex.: 25'  Nu-Step 8' L5  Left HS with pump 2x30\"  Left Trunk Rolls 2x20  WILDER 3'      Neuro Re-ed.: 20'  Bridges 2x20  Prone Hip Ext. 20xea.  Prone Swim 20x  PPU 2x10 Neuro Re-ed.: 20'  Bridges 2x20  Prone Hip Ext. 20xea.  Prone Swim 20x  PPU 2x10      Modalities:   Ice at the lower back for 10' unbilled after her treatment in wilder position. Modalities:   Ice at the lower back for 10' unbilled after her treatment in wilder position.      HEP: Did not add any new exercises to her HEP.    Charges: 2TE and 1Neuro       Total Timed Treatment: 45 min  Total Treatment Time: 45 min

## 2024-06-20 RX ORDER — SERTRALINE HYDROCHLORIDE 25 MG/1
25 TABLET, FILM COATED ORAL DAILY
Qty: 90 TABLET | Refills: 1 | Status: SHIPPED | OUTPATIENT
Start: 2024-06-20

## 2024-06-20 NOTE — TELEPHONE ENCOUNTER
Refill passed per [facility] protocol, however, please advise on high alert. Thanks.      High  Drug-Drug: DULoxetine and sertralineAdditive serotonergic effects may occur during coadministration of duloxetine and selective serotonin reuptake inhibitors (SSRIs), and the risk of developing serotonin syndrome may be increased.  Details  Override reason        sertraline 25 MG Oral Tab [Pharmacy Med Name: SERTRALINE HCL 25 MG TABLET]  Prescription. Reordered.  DULoxetine 30 MG Oral Cap DR ParticlesPrescription. Active.  Discontinue  Medium  Duplicate Therapy: sertraline, DULoxetineSSRIS AND SNRIS. No Abuse/Dependency Potential.  Details  Override reason        sertraline 25 MG Oral Tab [Pharmacy Med Name: SERTRALINE HCL 25 MG TABLET], Daily  Prescription. Reordered.  DULoxetine 30 MG Oral Cap DR Particles, DailyPrescription. Active.    Due for physical in SEPTEMBER    Requested Prescriptions   Pending Prescriptions Disp Refills    SERTRALINE 25 MG Oral Tab [Pharmacy Med Name: SERTRALINE HCL 25 MG TABLET] 90 tablet 2     Sig: Take 1 tablet (25 mg total) by mouth daily.       Psychiatric Non-Scheduled (Anti-Anxiety) Passed - 6/18/2024 12:05 AM        Passed - In person appointment or virtual visit in the past 6 mos or appointment in next 3 mos     Recent Outpatient Visits              2 days ago Lower back pain    [facility] [place]    Office Visit    1 week ago Lower back pain    [facility] [place]    Office Visit    1 week ago Lumbar spondylosis    [facility] Behar, Alex, MD    Office Visit    2 weeks ago Lower back pain    [facility] [place]    Office Visit    1 month ago Myalgia    [facility] Behar, Alex, MD    Office Visit          Future Appointments         Provider Department Appt Notes    In 5 days [place], PT  [facility] [place] 8 visits 6/4 to 9/4  HUMANA MA   $20 c/p    In 1 week [place], PT [facility] [place] 8 visits 6/4 to 9/4  HUMANA MA   $20 c/p    In 2 weeks [place], PT [facility] [place] 8 visits 6/4 to 9/4  HUMANA MA   $20 c/p    In 3 weeks [place] Nav[place], PT [facility] [place] 8 visits 6/4 to 9/4  HUMANA MA   $20 c/p    In 1 month [place], PT [facility] [place] Last Auth Visit   8 visits 6/4 to 9/4  HUMANA MA   $20 c/p                    Passed - Depression Screening completed within the past 12 months           Recent Outpatient Visits              2 days ago Lower back pain    [facility] [place], PT    Office Visit    1 week ago Lower back pain    [facility] [place], PT    Office Visit    1 week ago Lumbar spondylosis    [facility] Behar, Alex, MD    Office Visit    2 weeks ago Lower back pain    [facility] [place], PT    Office Visit    1 month ago Myalgia    [facility] Behar, Alex, MD    Office Visit          Future Appointments         Provider Department Appt Notes    In 5 days [place] Nav[place], PT [facility] [place] 8 visits 6/4 to 9/4  HUMANA MA   $20 c/p    In 1 week [place] Nav[place]re[place] PT [facility] [place] 8 visits 6/4 to 9/4  HUMANA MA   $20 c/p    In 2 weeks [place] Nav[place], PT [facility] [place] 8 visits 6/4 to 9/4  HUMANA MA   $20 c/p    In 3 weeks [place], PT [facility] [place] 8 visits 6/4 to 9/4  HUMANA MA   $20 c/p    In 1 month [place] Nav[place], PT [facility] [place] Last Auth Visit   8 visits 6/4 to 9/4  HUMANA MA   $20 c/p

## 2024-06-25 ENCOUNTER — OFFICE VISIT (OUTPATIENT)
Dept: PHYSICAL THERAPY | Age: 64
End: 2024-06-25
Attending: PHYSICAL MEDICINE & REHABILITATION

## 2024-06-25 DIAGNOSIS — M54.50 LOWER BACK PAIN: Primary | ICD-10-CM

## 2024-06-25 PROCEDURE — 97112 NEUROMUSCULAR REEDUCATION: CPT

## 2024-06-25 PROCEDURE — 97110 THERAPEUTIC EXERCISES: CPT

## 2024-06-25 NOTE — PROGRESS NOTES
Diagnosis:   LBP      Referring Provider: Alex Behar  Date of Evaluation:    6/4/24    Precautions:   OA Next MD visit:   none scheduled  Date of Surgery: n/a   Insurance Primary/Secondary: HUMANA [facility] / N/A     # Auth Visits: 10            Subjective: Patient reports continued lbp this evening. She has been doing her HEP and felt ok after her last visit.    Pain: 6/10      Objective:   Observation/Posture: hip er and right trunk lean.  Neuro Screen: intact freda.     Trunk AROM: (* denotes performed with pain)  Flexion: 50%  Extension: 25%  Sidebending: R 50%; L 50%  Rotation: R 50%; L 50%     Accessory motion: Lumbar hypomobility with central and uni. PAS; +2 grades.  Palpation: Left lbp.     Strength: (* denotes performed with pain)  LE   Hip flexion (L2): R 4+/5; L 4/5  Hip abduction: R 4+/5; L 4/5  Hip Extension: R 4+/5; L 4/5            Hip ER: R 4+/5; L 4/5  Hip IR: R 4+/5; L 4/5  Knee Flexion: R 4+/5; L 4/5            Knee extension (L3): R 4+/5; L 4/5            DF (L4): R 4+/5; L 4/5  Great Toe Ext (L5): R 4+/5, L 4/5  PF (S1): R 4+/5; L 4/5      Flexibility:   LE   Hip Flexor: R Tight, L Tight  Hamstrings: R Tight; L Tight  Piriformis: R Tight; L Tight  Quads: R Tight; L Tight  Gastroc-soleus: R Tight; L Tight      Special tests:   Positive for left slump test and negative at the right.     Gait: pt ambulates on level ground with antalgia and right trunk lean .  Balance: SLS R 7, L 7; Poor plus balance.      Assessment: Patient presents with left le tightness during her manual stretching and neuro mob. The patient does tolerate extension based exercises well. Added left hip stretches back to her treatment.      Goals: (to be met in 10 visits)   .Pt will improve transversus abdominis recruitment to perform proper isometric contraction without requiring verbal or tactile cuing to promote advancement of therex   Pt will demonstrate good understanding of proper posture and body mechanics to decrease pain and  improve spinal safety   Pt will improve lumbar spine AROM flexion to touch her toes allow increase ease with bending forward to don shoes   Pt will report improved symptom centralization and absence of radicular symptoms for 3 consecutive days to improve function with ADL   Pt will have decreased paraspinal mm tension to tolerate standing >10 minutes for work and home activities   Pt will demonstrate improved core strength to be able to perform gait with <1/10 pain   Pt will be independent and compliant with comprehensive HEP to maintain progress achieved in PT     Plan: Plan to work on trom, stretching and strengthening.  Date: 6/11/2024  TX#: 2/10 Date:  6/18/24              TX#: 3/10 Date:  6/25/24               TX#: 4/10 Date:                 TX#: 5/ Date:   Tx#: 6/ Ther. Ex.: 25'  Nu-Step 8' L5  Left Pfs and Glut. St. 2x30\"xea.  Left HS with pump 2x30\"  Left Trunk Rolls 2x20  WILDER 3' Ther. Ex.: 25'  Nu-Step 8' L5  Left HS with pump 2x30\"  Left Trunk Rolls 2x20  WILDER 3' Ther. Ex.: 25'  Nu-Step 8' L5  Left HS with pump 2x30\"  Left Trunk Rolls 2x20  WILDER 3'  Left PFS and Glut. St. 2x30\"xea.     Neuro Re-ed.: 20'  Bridges 2x20  Prone Hip Ext. 20xea.  Prone Swim 20x  PPU 2x10 Neuro Re-ed.: 20'  Bridges 2x20  Prone Hip Ext. 20xea.  Prone Swim 20x  PPU 2x10 Neuro Re-ed.: 20'  Bridges 2x20  Prone Hip Ext. 20xea.  Prone Swim 20x  PPU 2x10     Modalities:   Ice at the lower back for 10' unbilled after her treatment in wilder position. Modalities:   Ice at the lower back for 10' unbilled after her treatment in wilder position. Modalities:   Ice at the lower back for 10' unbilled after her treatment in wilder position.     HEP: Did not add any new exercises to her HEP.    Charges: 2TE and 1Neuro       Total Timed Treatment: 45 min  Total Treatment Time: 45 min

## 2024-07-01 ENCOUNTER — TELEPHONE (OUTPATIENT)
Dept: PHYSICAL THERAPY | Facility: HOSPITAL | Age: 64
End: 2024-07-01

## 2024-07-02 ENCOUNTER — APPOINTMENT (OUTPATIENT)
Dept: PHYSICAL THERAPY | Age: 64
End: 2024-07-02
Attending: PHYSICAL MEDICINE & REHABILITATION
Payer: MEDICARE

## 2024-07-09 ENCOUNTER — OFFICE VISIT (OUTPATIENT)
Dept: PHYSICAL THERAPY | Age: 64
End: 2024-07-09
Attending: PHYSICAL MEDICINE & REHABILITATION
Payer: MEDICARE

## 2024-07-09 DIAGNOSIS — M54.50 LOWER BACK PAIN: Primary | ICD-10-CM

## 2024-07-09 PROCEDURE — 97140 MANUAL THERAPY 1/> REGIONS: CPT

## 2024-07-09 PROCEDURE — 97110 THERAPEUTIC EXERCISES: CPT

## 2024-07-09 PROCEDURE — 97112 NEUROMUSCULAR REEDUCATION: CPT

## 2024-07-09 NOTE — PROGRESS NOTES
Diagnosis:   LBP      Referring Provider: Alex Behar  Date of Evaluation:    6/4/24    Precautions:   OA Next MD visit:   none scheduled  Date of Surgery: n/a   Insurance Primary/Secondary: HUMANA [facility] / N/A     # Auth Visits: 10            Subjective: Patient reports decreased lbp this evening. She has been doing her HEP and felt ok after her last visit.    Pain: 5/10      Objective:   Observation/Posture: hip er and right trunk lean.  Neuro Screen: intact freda.     Trunk AROM: (* denotes performed with pain)  Flexion: 50%  Extension: 25%  Sidebending: R 50%; L 50%  Rotation: R 50%; L 50%     Accessory motion: Lumbar hypomobility with central and uni. PAS; +2 grades.  Palpation: Left lbp.     Strength: (* denotes performed with pain)  LE   Hip flexion (L2): R 4+/5; L 4/5  Hip abduction: R 4+/5; L 4/5  Hip Extension: R 4+/5; L 4/5            Hip ER: R 4+/5; L 4/5  Hip IR: R 4+/5; L 4/5  Knee Flexion: R 4+/5; L 4/5            Knee extension (L3): R 4+/5; L 4/5            DF (L4): R 4+/5; L 4/5  Great Toe Ext (L5): R 4+/5, L 4/5  PF (S1): R 4+/5; L 4/5      Flexibility:   LE   Hip Flexor: R Tight, L Tight  Hamstrings: R Tight; L Tight  Piriformis: R Tight; L Tight  Quads: R Tight; L Tight  Gastroc-soleus: R Tight; L Tight      Special tests:   Positive for left slump test and negative at the right.     Gait: pt ambulates on level ground with antalgia and right trunk lean .  Balance: SLS R 7, L 7; Poor plus balance.      Assessment: Patient presents with left le tightness during her manual stretching and neuro mob. The patient does tolerate extension based exercises well. Added lumbar rotation adjustment for improved trunk rotation.      Goals: (to be met in 10 visits)   .Pt will improve transversus abdominis recruitment to perform proper isometric contraction without requiring verbal or tactile cuing to promote advancement of therex   Pt will demonstrate good understanding of proper posture and body mechanics to  decrease pain and improve spinal safety   Pt will improve lumbar spine AROM flexion to touch her toes allow increase ease with bending forward to don shoes   Pt will report improved symptom centralization and absence of radicular symptoms for 3 consecutive days to improve function with ADL   Pt will have decreased paraspinal mm tension to tolerate standing >10 minutes for work and home activities   Pt will demonstrate improved core strength to be able to perform gait with <1/10 pain   Pt will be independent and compliant with comprehensive HEP to maintain progress achieved in PT     Plan: Plan to work on trom, stretching and strengthening.  Date: 6/11/2024  TX#: 2/10 Date:  6/18/24              TX#: 3/10 Date:  6/25/24               TX#: 4/10 Date:   7/9/24              TX#: 5/10 Date:   Tx#: 6/ Ther. Ex.: 25'  Nu-Step 8' L5  Left Pfs and Glut. St. 2x30\"xea.  Left HS with pump 2x30\"  Left Trunk Rolls 2x20  WILDER 3' Ther. Ex.: 25'  Nu-Step 8' L5  Left HS with pump 2x30\"  Left Trunk Rolls 2x20  WILDER 3' Ther. Ex.: 25'  Nu-Step 8' L5  Left HS with pump 2x30\"  Left Trunk Rolls 2x20  WILDER 3'  Left PFS and Glut. St. 2x30\"xea. Ther. Ex.: 25'  Nu-Step 8' L5  Left HS with pump 2x30\"  Left Trunk Rolls 2x20  WILDER 3'  Left PFS and Glut. St. 2x30\"xea.  Table Trunk Ext. 20x5\"    Neuro Re-ed.: 20'  Bridges 2x20  Prone Hip Ext. 20xea.  Prone Swim 20x  PPU 2x10 Neuro Re-ed.: 20'  Bridges 2x20  Prone Hip Ext. 20xea.  Prone Swim 20x  PPU 2x10 Neuro Re-ed.: 20'  Bridges 2x20  Prone Hip Ext. 20xea.  Prone Swim 20x  PPU 2x10 Neuro Re-ed.: 12'  Bridges 2x20  Prone Hip Ext. 20xea.  Prone Swim 20x  PPU 2x10    Modalities:   Ice at the lower back for 10' unbilled after her treatment in wilder position. Modalities:   Ice at the lower back for 10' unbilled after her treatment in wilder position. Modalities:   Ice at the lower back for 10' unbilled after her treatment in wilder position. Manual: 8'  Fabian. lumbar mid. Range rot. adjustment    HEP: Did not add  any new exercises to her HEP.    Charges: 2TE, 1MT and 1Neuro       Total Timed Treatment: 45 min  Total Treatment Time: 45 min

## 2024-07-16 ENCOUNTER — OFFICE VISIT (OUTPATIENT)
Dept: PHYSICAL THERAPY | Age: 64
End: 2024-07-16
Attending: PHYSICAL MEDICINE & REHABILITATION
Payer: MEDICARE

## 2024-07-16 DIAGNOSIS — M54.50 LOWER BACK PAIN: Primary | ICD-10-CM

## 2024-07-16 PROCEDURE — 97112 NEUROMUSCULAR REEDUCATION: CPT

## 2024-07-16 PROCEDURE — 97110 THERAPEUTIC EXERCISES: CPT

## 2024-07-16 PROCEDURE — 97140 MANUAL THERAPY 1/> REGIONS: CPT

## 2024-07-16 NOTE — PROGRESS NOTES
Diagnosis:   LBP      Referring Provider: Alex Behar  Date of Evaluation:    6/4/24    Precautions:   OA Next MD visit:   none scheduled  Date of Surgery: n/a   Insurance Primary/Secondary: HUMANA [facility] / N/A     # Auth Visits: 10            Subjective: Patient reports increased lbp this evening. She has been doing her HEP and felt ok after her last visit.    Pain: 5-6/10      Objective:   Observation/Posture: hip er and right trunk lean.  Neuro Screen: intact freda.     Trunk AROM: (* denotes performed with pain)  Flexion: 50%  Extension: 25%  Sidebending: R 50%; L 50%  Rotation: R 50%; L 50%     Accessory motion: Lumbar hypomobility with central and uni. PAS; +2 grades.  Palpation: Left lbp.     Strength: (* denotes performed with pain)  LE   Hip flexion (L2): R 4+/5; L 4/5  Hip abduction: R 4+/5; L 4/5  Hip Extension: R 4+/5; L 4/5            Hip ER: R 4+/5; L 4/5  Hip IR: R 4+/5; L 4/5  Knee Flexion: R 4+/5; L 4/5            Knee extension (L3): R 4+/5; L 4/5            DF (L4): R 4+/5; L 4/5  Great Toe Ext (L5): R 4+/5, L 4/5  PF (S1): R 4+/5; L 4/5      Flexibility:   LE   Hip Flexor: R Tight, L Tight  Hamstrings: R Tight; L Tight  Piriformis: R Tight; L Tight  Quads: R Tight; L Tight  Gastroc-soleus: R Tight; L Tight      Special tests:   Positive for left slump test and negative at the right.     Gait: pt ambulates on level ground with antalgia and right trunk lean .  Balance: SLS R 7, L 7; Poor plus balance.      Assessment: Patient presents with left le tightness and guarding during her manual stretching and neuro mob. The patient does tolerate extension based exercises well. She had increased pain at the left glut. With her self and soft tissue mob.      Goals: (to be met in 10 visits)   .Pt will improve transversus abdominis recruitment to perform proper isometric contraction without requiring verbal or tactile cuing to promote advancement of therex   Pt will demonstrate good understanding of proper posture  and body mechanics to decrease pain and improve spinal safety   Pt will improve lumbar spine AROM flexion to touch her toes allow increase ease with bending forward to don shoes   Pt will report improved symptom centralization and absence of radicular symptoms for 3 consecutive days to improve function with ADL   Pt will have decreased paraspinal mm tension to tolerate standing >10 minutes for work and home activities   Pt will demonstrate improved core strength to be able to perform gait with <1/10 pain   Pt will be independent and compliant with comprehensive HEP to maintain progress achieved in PT     Plan: Plan to work on trom, stretching and strengthening.  Date: 6/11/2024  TX#: 2/10 Date:  6/18/24              TX#: 3/10 Date:  6/25/24               TX#: 4/10 Date:   7/9/24              TX#: 5/10 Date: 7/16/24  Tx#: 6/10   Ther. Ex.: 25'  Nu-Step 8' L5  Left Pfs and Glut. St. 2x30\"xea.  Left HS with pump 2x30\"  Left Trunk Rolls 2x20  KOJO 3' Ther. Ex.: 25'  Nu-Step 8' L5  Left HS with pump 2x30\"  Left Trunk Rolls 2x20  KOJO 3' Ther. Ex.: 25'  Nu-Step 8' L5  Left HS with pump 2x30\"  Left Trunk Rolls 2x20  KOJO 3'  Left PFS and Glut. St. 2x30\"xea. Ther. Ex.: 25'  Nu-Step 8' L5  Left HS with pump 2x30\"  Left Trunk Rolls 2x20  KOJO 3'  Left PFS and Glut. St. 2x30\"xea.  Table Trunk Ext. 20x5\" Ther. Ex.: 25'  Nu-Step 8' L5  Left HS with pump 2x30\"  Left Trunk Rolls 2x20  KOJO 3'  Left PFS and Glut. St. 2x30\"xea.  Table Trunk Ext. 20x5\"  Tennis Ball Self Mob.   Neuro Re-ed.: 20'  Bridges 2x20  Prone Hip Ext. 20xea.  Prone Swim 20x  PPU 2x10 Neuro Re-ed.: 20'  Bridges 2x20  Prone Hip Ext. 20xea.  Prone Swim 20x  PPU 2x10 Neuro Re-ed.: 20'  Bridges 2x20  Prone Hip Ext. 20xea.  Prone Swim 20x  PPU 2x10 Neuro Re-ed.: 12'  Bridges 2x20  Prone Hip Ext. 20xea.  Prone Swim 20x  PPU 2x10 Neuro Re-ed.: 12'  Bridges 2x20  Prone Hip Ext. 20xea.  Prone Swim 20x  PPU 2x10   Modalities:   Ice at the lower back for 10' unbilled after her  treatment in wilder position. Modalities:   Ice at the lower back for 10' unbilled after her treatment in wilder position. Modalities:   Ice at the lower back for 10' unbilled after her treatment in wilder position. Manual: 8'  Fabian. lumbar mid. Range rot. adjustment Modalities:   Ice at the lower back for 10' unbilled after her treatment in wilder position.       Manual: 8'  STM left glut. Region.   HEP: Added ball self mob. to her HEP.    Charges: 2TE,1MT and 1Neuro       Total Timed Treatment: 45 min  Total Treatment Time: 45 min

## 2024-07-17 DIAGNOSIS — G47.00 INSOMNIA, UNSPECIFIED TYPE: ICD-10-CM

## 2024-07-17 RX ORDER — ZOLPIDEM TARTRATE 10 MG/1
10 TABLET ORAL NIGHTLY PRN
Qty: 90 TABLET | Refills: 1 | OUTPATIENT
Start: 2024-07-17

## 2024-07-17 NOTE — TELEPHONE ENCOUNTER
Prescription from 3/18/24 written for 90-day plus a refill. [facility], [facility]17/24.

## 2024-07-17 NOTE — TELEPHONE ENCOUNTER
Patient called requesting a refill on the following medication:    zolpidem 10 MG Oral Tab     Per patient she has three pills left. Patient is scheduled for an appointment on 09/28/2024 at 10:30am.

## 2024-07-19 DIAGNOSIS — I10 ESSENTIAL HYPERTENSION: ICD-10-CM

## 2024-07-23 ENCOUNTER — TELEPHONE (OUTPATIENT)
Dept: PHYSICAL THERAPY | Age: 64
End: 2024-07-23

## 2024-07-23 ENCOUNTER — APPOINTMENT (OUTPATIENT)
Dept: PHYSICAL THERAPY | Age: 64
End: 2024-07-23
Attending: PHYSICAL MEDICINE & REHABILITATION
Payer: MEDICARE

## 2024-07-23 RX ORDER — TELMISARTAN 40 MG/1
40 TABLET ORAL DAILY
Qty: 90 TABLET | Refills: 3 | Status: SHIPPED | OUTPATIENT
Start: 2024-07-23

## 2024-07-23 NOTE — TELEPHONE ENCOUNTER
Please review.  Protocol failed / Has no protocol.     Requested Prescriptions   Pending Prescriptions Disp Refills    TELMISARTAN 40 MG Oral Tab [Pharmacy Med Name: TELMISARTAN 40 MG TABLET] 90 tablet 3     Sig: ADEBAYO GOOD TOSTEFANI LOS REAL       Hypertension Medications Protocol Failed - 7/23/2024 10:20 AM        Failed - Last BP reading less than 140/90     BP Readings from Last 1 Encounters:   06/19/24 (!) 167/90               Passed - CMP or BMP in past 12 months        Passed - In person appointment or virtual visit in the past 12 mos or appointment in next 3 mos     Recent Outpatient Visits              1 week ago Lower back pain    [facility] [place]    Office Visit    2 weeks ago Lower back pain    [facility] [place]    Office Visit    4 weeks ago Lower back pain    [facility] [place]    Office Visit    1 month ago Lower back pain    [facility] [place]    Office Visit    1 month ago Lower back pain    [facility] [place]    Office Visit          Future Appointments         Provider Department Appt Notes    In 1 month Behar, Alex, MD [facility] [place] inj f/u    In 2 months Sayda Smith MD [facility] Last px 09/30/2023                    Passed - EGFRCR or GFRNAA > 50     GFR Evaluation  EGFRCR: 93 , resulted on 9/30/2023             Future Appointments         Provider Department Appt Notes    In 1 month Behar, Alex, MD [facility] inj f/u    In 2 months Sayda Smith MD [facility] Last px 09/30/2023          Recent Outpatient Visits              1 week ago Lower back pain    [facility] [place] [place]    Office Visit    2 weeks ago Lower back  pain    [facility] [place]    Office Visit    4 weeks ago Lower back pain    [facility] [place]    Office Visit    1 month ago Lower back pain    [facility] [place]    Office Visit    1 month ago Lower back pain    [facility] [place]    Office Visit

## 2024-07-25 ENCOUNTER — TELEPHONE (OUTPATIENT)
Dept: PHYSICAL THERAPY | Facility: HOSPITAL | Age: 64
End: 2024-07-25

## 2024-08-08 DIAGNOSIS — M85.80 OSTEOPENIA, UNSPECIFIED LOCATION: ICD-10-CM

## 2024-08-12 RX ORDER — ALENDRONATE SODIUM 70 MG/1
70 TABLET ORAL WEEKLY
Qty: 12 TABLET | Refills: 3 | Status: SHIPPED | OUTPATIENT
Start: 2024-08-12

## 2024-08-12 NOTE — TELEPHONE ENCOUNTER
Please Review. Protocol Failed; No Protocol     Requested Prescriptions   Pending Prescriptions Disp Refills    ALENDRONATE 70 MG Oral Tab [Pharmacy Med Name: ALENDRONATE SODIUM 70 MG TAB] 12 tablet 3     Sig: ADEBAYO MORGAN TABLETA (70 MG TOTAL) POR VIA ORAL ONCE A WEEK       Osteoporosis Medication Protocol Failed - 8/8/2024 12:10 AM        Failed - DEXA scan within past 2 years        Passed - In person appointment or virtual visit in the past 6 mos or appointment in next 3 mos     Recent Outpatient Visits              3 weeks ago Lower back pain    [place] [place]    Office Visit    1 month ago Lower back pain    [facility] [place], [facility]    Office Visit    1 month ago Lower back pain    [facility] [place]    Office Visit    1 month ago Lower back pain    [facility] [place]    Office Visit    2 months ago Lower back pain    [facility] [place]    Office Visit          Future Appointments         Provider Department Appt Notes    In 1 month Behar, Alex, MD [facility] [place] inj f/u    In 1 month Sayda Smith MD [facility] Last px 09/30/2023                    Passed - CMP within the past 12 months        Passed - Calcium level between 8.3 and 10.3     Lab Results   Component Value Date    CA 9.0 09/30/2023               Passed - GFR level greater than 35     GFR Evaluation  EGFRCR: 93 , resulted on 9/30/2023                 Future Appointments         Provider Department Appt Notes    In 1 month Behar, Alex, MD [facility] inj f/u    In 1 month Sayda Smith MD [facility] Last px 09/30/2023          Recent Outpatient Visits              3 weeks ago Lower back pain    [place] [place]    Office Visit    1 month ago Lower back pain    [facility] [place]    Office Visit    1 month ago Lower back pain    [facility] [place]    Office Visit    1 month ago Lower back pain    [facility] [place]    Office Visit    2 months ago Lower back pain    [facility] [place]    Office Visit

## 2024-08-22 DIAGNOSIS — M50.30 DEGENERATIVE DISC DISEASE, CERVICAL: ICD-10-CM

## 2024-08-22 DIAGNOSIS — M48.02 FORAMINAL STENOSIS OF CERVICAL REGION: ICD-10-CM

## 2024-08-22 DIAGNOSIS — R20.2 PARESTHESIAS: ICD-10-CM

## 2024-08-22 DIAGNOSIS — M79.18 MYALGIA OF MUSCLE OF NECK: ICD-10-CM

## 2024-08-22 NOTE — TELEPHONE ENCOUNTER
Please review.  Protocol failed/has no protocol.    Norco 5mg Recent fills each quantity 60 : 3/19, 5/23  Last prescription written: 5/23/24  Last office visit: 3/18/24    Future Appointments  Date Type Provider Dept   09/28/24 Appointment Sayda Smith MD [facility]

## 2024-08-22 NOTE — TELEPHONE ENCOUNTER
Patient is requesting refill for HYDROcodone-acetaminophen (NORCO) 5-325 MG Oral Tab       [id number] [place], [place] - [address] [place] [address] AT across from Dorian Bearden, [phone], [phone]

## 2024-08-26 RX ORDER — HYDROCODONE BITARTRATE AND ACETAMINOPHEN 5; 325 MG/1; MG/1
1 TABLET ORAL EVERY 8 HOURS PRN
Qty: 60 TABLET | Refills: 0 | Status: SHIPPED | OUTPATIENT
Start: 2024-08-26

## 2024-09-17 ENCOUNTER — OFFICE VISIT (OUTPATIENT)
Dept: PHYSICAL MEDICINE AND REHAB | Facility: CLINIC | Age: 64
End: 2024-09-17
Payer: MEDICARE

## 2024-09-17 ENCOUNTER — TELEPHONE (OUTPATIENT)
Dept: PHYSICAL MEDICINE AND REHAB | Facility: CLINIC | Age: 64
End: 2024-09-17

## 2024-09-17 VITALS — WEIGHT: 125 LBS | BODY MASS INDEX: 25 KG/M2

## 2024-09-17 DIAGNOSIS — M51.36 BULGE OF LUMBAR DISC WITHOUT MYELOPATHY: ICD-10-CM

## 2024-09-17 DIAGNOSIS — M79.7 FIBROMYALGIA: ICD-10-CM

## 2024-09-17 DIAGNOSIS — M47.816 FACET SYNDROME, LUMBAR: Primary | ICD-10-CM

## 2024-09-17 DIAGNOSIS — I10 ESSENTIAL HYPERTENSION: ICD-10-CM

## 2024-09-17 DIAGNOSIS — M48.061 LUMBAR FORAMINAL STENOSIS: ICD-10-CM

## 2024-09-17 DIAGNOSIS — M79.10 MYALGIA: ICD-10-CM

## 2024-09-17 DIAGNOSIS — F33.1 MODERATE EPISODE OF RECURRENT MAJOR DEPRESSIVE DISORDER (HCC): ICD-10-CM

## 2024-09-17 DIAGNOSIS — G89.29 CHRONIC LOW BACK PAIN WITHOUT SCIATICA, UNSPECIFIED BACK PAIN LATERALITY: ICD-10-CM

## 2024-09-17 DIAGNOSIS — M51.37 DDD (DEGENERATIVE DISC DISEASE), LUMBOSACRAL: ICD-10-CM

## 2024-09-17 DIAGNOSIS — M54.50 CHRONIC LOW BACK PAIN WITHOUT SCIATICA, UNSPECIFIED BACK PAIN LATERALITY: ICD-10-CM

## 2024-09-17 DIAGNOSIS — R10.2 PELVIC PAIN: ICD-10-CM

## 2024-09-17 DIAGNOSIS — M47.816 LUMBAR SPONDYLOSIS: ICD-10-CM

## 2024-09-17 DIAGNOSIS — M47.816 FACET SYNDROME, LUMBAR: ICD-10-CM

## 2024-09-17 DIAGNOSIS — M54.16 LUMBAR RADICULOPATHY: ICD-10-CM

## 2024-09-17 DIAGNOSIS — E78.5 HYPERLIPIDEMIA, UNSPECIFIED HYPERLIPIDEMIA TYPE: ICD-10-CM

## 2024-09-17 DIAGNOSIS — M54.59 MECHANICAL LOW BACK PAIN: ICD-10-CM

## 2024-09-17 DIAGNOSIS — M47.816 LUMBAR SPONDYLOSIS: Primary | ICD-10-CM

## 2024-09-17 PROCEDURE — 99214 OFFICE O/P EST MOD 30 MIN: CPT | Performed by: PHYSICAL MEDICINE & REHABILITATION

## 2024-09-17 RX ORDER — CYCLOBENZAPRINE HCL 5 MG
TABLET ORAL
Qty: 90 TABLET | Refills: 0 | Status: SHIPPED | OUTPATIENT
Start: 2024-09-17

## 2024-09-17 RX ORDER — DULOXETIN HYDROCHLORIDE 30 MG/1
30 CAPSULE, DELAYED RELEASE ORAL DAILY
Qty: 60 CAPSULE | Refills: 0 | Status: SHIPPED | OUTPATIENT
Start: 2024-09-17

## 2024-09-17 RX ORDER — DULOXETIN HYDROCHLORIDE 30 MG/1
30 CAPSULE, DELAYED RELEASE ORAL DAILY
Qty: 90 CAPSULE | Refills: 0 | OUTPATIENT
Start: 2024-09-17

## 2024-09-17 NOTE — TELEPHONE ENCOUNTER
Initiated authorization for Caudal RICKY. CPT/HCPCS 64744, dx:M54.16, M79.10 to be done at [facility] with [employer]    Status: Approved  Reference/Authorization # [id number]  Valid: 9/17/24-12/16/24

## 2024-09-17 NOTE — PROGRESS NOTES
[facility]  Progress Note    CHIEF COMPLAINT:    Chief Complaint   Patient presents with    Follow - Up     Lov 6/19/24 follow up on Bilateral L4-L5 and L5-S1 facet joint injections 30% improvement .No T/N. Tramadol prn duloxetine/gabapentin  daily. Lower back pain that radiates left leg. Pain 6/10.        History of Present Illness:  The patient is a 64 year old RIGHT handed female with significant past medical history of anxiety, depression, fibromyalgia, hypertension, hyperlipidemia, obesity, bilateral rotator cuff tear status post right rotator cuff repair in 2015  who presents for follow-up of her bilateral low back pain with occasional radiation into the left leg.  She is status post left L5 and left S1 transforaminal epidural steroid injection on 5/29/2024 and bilateral L4-L5 and L5-S1 facet joint injections on 6/19/2024.  As a pertains to her radicular symptoms on the left leg, she has improved by about 40 to 50%.  As a pertains to her axial low back pain, she has improved by about 30 to 40%.  She rates her discomfort a 6-7 out of 10 in the low back bilaterally and into the bilateral buttock.  Her symptoms are aggravated by sitting and improved by standing and walking.  She has had an MRI of the lumbar spine which I reviewed.  She has been taking tramadol as needed as well as duloxetine and gabapentin daily.  She is planning to travel outside of the country in October and needs a prescription for her duloxetine to last her for that long.  She has not tried pelvic floor physical therapy.  She does have chronic stress incontinence.  She feels her pelvic pain has improved with physical therapy.  I have reviewed her physical therapy notes and has completed 6 sessions of treatment.    PAST MEDICAL HISTORY:  Past Medical History:    Anxiety state    Back problem    Calculus of kidney    Carpal tunnel syndrome    L & R    Cyst of finger    R index    Depression     Diverticulosis of large intestine    Esophageal reflux    Fibromyalgia    High blood pressure    High cholesterol    Obesity    Obstructive sleep apnea    Prediabetes    Rotator cuff disorder    right shoulder    Sleep apnea    mild no machine    Visual impairment    readers       SURGICAL HISTORY:  Past Surgical History:   Procedure Laterality Date    Carpal tunnel release Bilateral 2013    Colonoscopy N/A 08/28/2018    Procedure: COLONOSCOPY;  Surgeon: Rishabh Pinto MD;  Location: [facility] ENDO    Colonoscopy      Colonoscopy N/A 10/20/2023    Procedure: COLONOSCOPY;  Surgeon: Rishabh Pinto MD;  Location: [facility] ENDO    Hysterectomy  2007    Other  2015    Right shoulder arthroscopically assisted rotator-cuff repair    Other surgical history  03/08/2023    exploratory laparotomy, small bowel resection    Repair rotator cuff,acute      Vaginal hysterectomy  2007       SOCIAL HISTORY:   Social History     Occupational History    Not on file   Tobacco Use    Smoking status: Never    Smokeless tobacco: Never   Vaping Use    Vaping status: Never Used   Substance and Sexual Activity    Alcohol use: No     Alcohol/week: 0.0 standard drinks of alcohol    Drug use: No    Sexual activity: Yes     Birth control/protection: Hysterectomy       FAMILY HISTORY:   Family History   Problem Relation Age of Onset    Diabetes Mother     Diabetes Brother        CURRENT MEDICATIONS:   Current Outpatient Medications   Medication Sig Dispense Refill    cyclobenzaprine 5 MG Oral Tab 5 mg 0.5-2 tablets three times per day as needed for spasms. Do not operate heavy machinery while on this medication as it may make you sleepy 90 tablet 0    DULoxetine 30 MG Oral Cap DR Particles Take 1 capsule (30 mg total) by mouth daily. 60 capsule 0    alendronate 70 MG Oral Tab Take 1 tablet (70 mg total) by mouth once a week. 12 tablet 3    telmisartan 40 MG Oral Tab Take 1 tablet (40 mg total) by mouth daily. TOME CATHY TABLETA TODOS LOS REAL 90 tablet  3    sertraline 25 MG Oral Tab Take 1 tablet (25 mg total) by mouth daily. 90 tablet 1    gabapentin 300 MG Oral Cap TAKE 2 CAPSULES BY MOUTH EVERY MORNING AND TAKE 3 CAPSULES BY MOUTH IN THE EVENING 450 capsule 3    Cholecalciferol (VITAMIN D) 50 MCG (2000 UT) Oral Tab Take by mouth.      Multiple Vitamins-Minerals (MULTI-VITAMIN/MINERALS) Oral Tab Take 1 tablet by mouth daily.      zolpidem 10 MG Oral Tab Take 1 tablet (10 mg total) by mouth nightly as needed for Sleep. AT BEDTIME 90 tablet 1    simvastatin 20 MG Oral Tab Take 1 tablet (20 mg total) by mouth nightly. TOME CATHY TABLETA POR boca TODOS las noches 90 tablet 3    Omeprazole 40 MG Oral Capsule Delayed Release TOME CATHY CAPSULA TODOS LOS REAL 90 capsule 3    traMADol 50 MG Oral Tab Take 1 tablet (50 mg total) by mouth 2 (two) times daily as needed for Pain. (Patient not taking: Reported on 9/17/2024) 60 tablet 0    HYDROcodone-acetaminophen (NORCO) 5-325 MG Oral Tab Take 1 tablet by mouth every 8 (eight) hours as needed for Pain. (Patient not taking: Reported on 9/17/2024) 60 tablet 0       ALLERGIES:   Allergies   Allergen Reactions    Bee Pollen UNKNOWN    Grass UNKNOWN    Pollen OTHER (SEE COMMENTS)     Nasal and throat congestion     Seasonal     Ceftriaxone RASH       REVIEW OF SYSTEMS:   Review of Systems   Constitutional: Negative.    HENT: Negative.    Eyes: Negative.    Respiratory: Negative.    Cardiovascular: Negative.    Gastrointestinal: Negative.    Genitourinary: Negative.    Musculoskeletal: As per HPI  Skin: Negative.    Neurological: As per HPI  Endo/Heme/Allergies: Negative.    Psychiatric/Behavioral: Negative.      All other systems reviewed and are negative. Pertinent positives and negatives noted in the HPI.        PHYSICAL EXAM:   Wt 125 lb (56.7 kg)   BMI 25.25 kg/m²     Body mass index is 25.25 kg/m².      General: No immediate distress  Head: Normocephalic/ Atraumatic  Eyes: Extra-occular movements intact.   Ears: No auricular  hematoma or deformities  Mouth: No lesions or ulcerations  Heart: peripheral pulses intact. Normal capillary refill.   Lungs: Non-labored respirations  Abdomen: No abdominal guarding  Extremities: No lower extremity edema bilaterally   Skin: No lesions noted.   Cognition: alert & oriented x 3, attentive, able to follow 2 step commands, comprehention intact, spontaneous speech intact  Motor:    Musculoskeletal:        Data  No visits with results within 6 Month(s) from this visit.   Latest known visit with results is:   Admission on 10/20/2023, Discharged on 10/20/2023   Component Date Value Ref Range Status    Case Report 10/20/2023    Final                    Value:Surgical Pathology                                Case: [id number]                                  Authorizing Provider:  Rishabh Pinto MD       Collected:           10/20/2023 09:43 AM          Ordering Location:     [facility]   Received:            10/20/2023 02:21 PM          Pathologist:           Mart Hess MD                                                        Specimens:   A) - Colon transverse, polyp x 1                                                                    B) - Colon ascending, polyp x 2                                                                     C) - Sigmoid colon, polyp x 1                                                                       D) - Rectum, polyp x 1                                                                     Final Diagnosis: 10/20/2023    Final                    Value:This result contains rich text formatting which cannot be displayed here.    Clinical Information 10/20/2023    Final                    Value:This result contains rich text formatting which cannot be displayed here.    Gross Description 10/20/2023    Final                    Value:This result contains rich text formatting which cannot be displayed here.    Interpretation 10/20/2023 Benign    Final    ]      Radiology Imaging:  I reviewed with the patient her X-ray and MRI of the lumbar spine   XR LUMBAR SPINE AP LAT FLEX EXT (CPT=72110)  Narrative: PROCEDURE:XR LUMBAR SPINE AP LAT FLEX EXT EM (CPT=72120)     COMPARISON:None     INDICATIONS:Chronic bilateral low back and leg pain.     TECHNIQUE:  Lumbar spine radiographs, 4 views (AP, lateral, flexion, and extension views)       FINDINGS:            ALIGNMENT:              Anatomic.  VERTEBRAL BODIES:           Vertebral bodies are intact.  L3 bone island.  DISC SPACES:           Small endplate osteophytes throughout the lumbar spine and lower thoracic spine..  Mild narrowing of the L2-3 disc.  Other disc spaces are relatively preserved.  SACROILIAC JOINTS:           Normal.    FLEXION/EXTENSION VIEWS:         Restricted range of motion.  No subluxation during flexion and extension.    OTHER:          Atherosclerotic vascular calcification              Impression: CONCLUSION:     1. Multilevel degenerative disc disease/spondylosis most pronounced at L3-4.           Dictated by (CST): Randy Dhaliwal MD on 4/23/2024 at 9:31 PM       Finalized by (CST): Randy Dhaliwal MD on 4/23/2024 at 9:34 PM             PROCEDURE: MRI SPINE LUMBAR (CPT=72148)     COMPARISON: CT ABDOMEN PELVIS IV CONTRAST NO ORAL (ER), 3/06/2023, 8:44 PM.  [facility], MRI SPINE LUMBAR (CPT=72148), 6/24/2017, 2:42 PM.     INDICATIONS: Focal psychomotor deficit. Chronic low back pain. Weakness of both lower extremities (R29.898, G89.29, M54.50).     TECHNIQUE: A variety of imaging planes and parameters were utilized for visualization of suspected pathology.     FINDINGS:  NUMERATION: For the purposes of this examination, the lowest fully formed disc space is designated as L5-S1.  ALIGNMENT: There is preservation of the expected lumbar lordosis.  BONES: No fracture or suspicious osseous lesion is evident. A sclerotic lesion of the L3 vertebral segment appears indolent and  may reflect a bone island. Multilevel anterior osteophyte formation is demonstrated.  CORD/CAUDA EQUINA: The conus medullaris terminates at the level of the L2 superior endplate. The distal cord and nerve roots have normal caliber, contour, and signal intensity.  PARASPINAL AREA: No visible mass.    OTHER:   Extrahepatic biliary dilatation is seen measuring up to 0.8 cm. Scattered colonic diverticula are present throughout the colon. There is no colonic wall thickening or pericolonic fat stranding in the imaged volume. Midline ventral abdominal  scarring is present. There is a small fat-containing umbilical hernia.     LUMBAR DISC LEVELS:  L1-L2: A mild, diffuse disc bulge is suggested. There is minimal hypertrophic facet arthrosis. No significant spinal canal or foraminal stenosis is seen.  L2-L3: Mild, diffuse disc bulge is appreciated. There is mild prominence of dorsal epidural fat. No significant spinal canal or foraminal stenosis results.  L3-L4: A mild, diffuse disc bulge is present with right foraminal annular fissure. Mild hypertrophic facet arthrosis is seen with slight buckling of the ligamentum flavum. There is slight prominence of dorsal epidural fat contributing to a trefoil  configuration of the thecal sac results without substantial foraminal impingement.  L4-L5: A moderate, diffuse disc bulge is seen with superimposed broad-based bilateral foraminal protrusions. Hypertrophic facet arthrosis is seen with buckling of the ligamentum flavum. There is mild prominence of dorsal epidural fat. These findings  contribute to a trefoil configuration of the thecal sac with borderline bilateral foraminal narrowing.  L5-S1: Mild disc degeneration is suggested with superimposed broad-based right foraminal protrusion. There is trace hypertrophic facet arthrosis with minimal right-sided facet joint effusion. These findings contribute to borderline right foraminal  stenosis.                  Impression    CONCLUSION:  1. Mild degenerative changes of the lumbar spine with minimal foraminal narrowing as detailed above.     2. No acute osseous injury of the lumbar spine.       3. Scattered colonic diverticulosis partially visualized without MR evidence of acute complication in the imaged volume.       4. Lesser incidental findings as above.           Dictated by (CST): Low Liu MD on 3/25/2024 at 1:54 PM      Finalized by (CST): Low Liu MD on 3/25/2024 at 1:58 PM            ASSESSMENT AND PLAN:  Jessy is a pleasant 64-year-old female presents for follow-up of her continued bilateral low back and buttock pain.  Her radicular symptoms down the left leg have significantly improved.  She continues to have discomfort bilaterally into the bilateral buttocks with limited improvement following the facet joint injections, and recommending a caudal epidural steroid injection under local anesthesia.  I am also recommending she continue the duloxetine and gabapentin.  I will start her on Flexeril as well for muscle relaxers as she may have a component of pelvic floor hypertonicity.  If her symptoms do not improve with the caudal epidural steroid injection, then I would recommend pelvic floor physical therapy.       RTC in 2 to 3 weeks after procedure  Discharge Instructions were provided as documented in AVS summary.  The patient was in agreement with the assessment and plan.  All questions were answered.  There were no barriers to learning.         1. Facet syndrome, lumbar    2. Lumbar spondylosis    3. Lumbar radiculopathy    4. Myalgia    5. Mechanical low back pain    6. DDD (degenerative disc disease), lumbosacral    7. Lumbar foraminal stenosis    8. Bulge of lumbar disc without myelopathy    9. Pelvic pain    10. Fibromyalgia    11. Chronic low back pain without sciatica, unspecified back pain laterality    12. Moderate episode of recurrent major depressive disorder (HCC)    13. Hyperlipidemia,  unspecified hyperlipidemia type    14. Essential hypertension        Alex B. Behar MD  Physical Medicine and Rehabilitation/Sports Medicine  [facility]

## 2024-09-17 NOTE — PATIENT INSTRUCTIONS
1) Continue Duloxetine and gabapentin. I have written to dispense 60 tablets of Duloxetine as you are going out of town for about 1 month  2) Start cyclobenzaprine 5 mg 0.5-2 tablets three times per day as needed for spasms. Do not operate heavy machinery while on this medication as it may make you sleepy.   3) My office will call you to schedule the Caudal RICKY under local anesthesia once the procedure is approved by your insurance carrier.    4) Follow up with me 2 weeks after the procedure. This can be in the office or virtually. If symptoms persist, then would recommend pelvic floor therapy

## 2024-09-19 NOTE — TELEPHONE ENCOUNTER
FYI: Patient notified writer, who notified MD: \" I have restarted Tramadol, while pharmacy is out of Conet[place].\" Advised she isn't able to take both medications together, verbalized understanding.  Will continue with Tramadol until Norco has been re-stocked.    Patient has been scheduled for caudal steroid epidural injection on 9/25/24 at the [facility] with Dr. Behar.   Anesthesia type:  Local  Please note: The [facility] will call the business day prior to discuss the exact time/arrival and additional instructions for your appointment.  Patient was advised that if he/she does receive the covid vaccine it needs to be at least 2 weeks before or after the injection.  Medications and allergies reviewed.  Educated to hold NSAIDS (Aleve, Ibuprofen, Motrin, Advil) and anti-inflammatories (Meloxicam, Naproxen, Diclofenac, Celebrex) and for cervical injections must hold Multi-Vitamins, Vitamin E, Fish Oil/Omega-3.  I   Patient informed of [facility]'s  policy:  he/she will need a  to and from procedure and must be on site for their entirety of their visit, if their ride is unable to the procedure will be cancelled.   [facility] is located in the [facility] 1st floor [address], [place] [address].   may park in the yellow/purple parking lot.  Patient verbalized understanding and agrees with plan.  Scheduled in Epic: Yes  Scheduled in Surgical Case: Yes  Follow up appointment made: NOV: Visit date not found

## 2024-09-28 ENCOUNTER — LAB ENCOUNTER (OUTPATIENT)
Dept: LAB | Age: 64
End: 2024-09-28
Attending: FAMILY MEDICINE
Payer: MEDICARE

## 2024-09-28 DIAGNOSIS — Z00.00 ENCOUNTER FOR ANNUAL HEALTH EXAMINATION: ICD-10-CM

## 2024-09-28 DIAGNOSIS — E78.00 PURE HYPERCHOLESTEROLEMIA: ICD-10-CM

## 2024-09-28 PROBLEM — Z23 INFLUENZA VACCINE NEEDED: Status: RESOLVED | Noted: 2022-10-25 | Resolved: 2024-09-28

## 2024-09-28 PROBLEM — Z12.31 ENCOUNTER FOR SCREENING MAMMOGRAM FOR MALIGNANT NEOPLASM OF BREAST: Status: RESOLVED | Noted: 2022-10-25 | Resolved: 2024-09-28

## 2024-09-28 PROBLEM — G47.33 OBSTRUCTIVE SLEEP APNEA (ADULT) (PEDIATRIC): Status: ACTIVE | Noted: 2022-01-01

## 2024-09-28 PROBLEM — G47.33 OBSTRUCTIVE SLEEP APNEA (ADULT) (PEDIATRIC): Status: RESOLVED | Noted: 2022-01-01 | Resolved: 2024-09-28

## 2024-09-28 PROBLEM — G47.00 INSOMNIA: Status: RESOLVED | Noted: 2022-04-11 | Resolved: 2024-09-28

## 2024-09-28 PROBLEM — Z91.81 HISTORY OF FALLING: Status: RESOLVED | Noted: 2022-01-01 | Resolved: 2024-09-28

## 2024-09-28 PROBLEM — L03.311 ABDOMINAL WALL CELLULITIS: Status: RESOLVED | Noted: 2023-02-05 | Resolved: 2024-09-28

## 2024-09-28 LAB
ALBUMIN SERPL-MCNC: 5.1 G/DL (ref 3.2–4.8)
ALBUMIN/GLOB SERPL: 2 {RATIO} (ref 1–2)
ALP LIVER SERPL-CCNC: 62 U/L
ALT SERPL-CCNC: 14 U/L
ANION GAP SERPL CALC-SCNC: 10 MMOL/L (ref 0–18)
AST SERPL-CCNC: 16 U/L (ref ?–34)
BASOPHILS # BLD AUTO: 0.05 X10(3) UL (ref 0–0.2)
BASOPHILS NFR BLD AUTO: 0.7 %
BILIRUB SERPL-MCNC: 1 MG/DL (ref 0.2–1.1)
BUN BLD-MCNC: 13 MG/DL (ref 9–23)
BUN/CREAT SERPL: 14.9 (ref 10–20)
CALCIUM BLD-MCNC: 9.8 MG/DL (ref 8.7–10.4)
CHLORIDE SERPL-SCNC: 101 MMOL/L (ref 98–112)
CHOLEST SERPL-MCNC: 238 MG/DL (ref ?–200)
CO2 SERPL-SCNC: 24 MMOL/L (ref 21–32)
CREAT BLD-MCNC: 0.87 MG/DL
DEPRECATED RDW RBC AUTO: 42.1 FL (ref 35.1–46.3)
EGFRCR SERPLBLD CKD-EPI 2021: 74 ML/MIN/1.73M2 (ref 60–?)
EOSINOPHIL # BLD AUTO: 0.03 X10(3) UL (ref 0–0.7)
EOSINOPHIL NFR BLD AUTO: 0.4 %
ERYTHROCYTE [DISTWIDTH] IN BLOOD BY AUTOMATED COUNT: 12.7 % (ref 11–15)
EST. AVERAGE GLUCOSE BLD GHB EST-MCNC: 140 MG/DL (ref 68–126)
FASTING PATIENT LIPID ANSWER: YES
FASTING STATUS PATIENT QL REPORTED: YES
GLOBULIN PLAS-MCNC: 2.6 G/DL (ref 2–3.5)
GLUCOSE BLD-MCNC: 110 MG/DL (ref 70–99)
HBA1C MFR BLD: 6.5 % (ref ?–5.7)
HCT VFR BLD AUTO: 41.3 %
HDLC SERPL-MCNC: 98 MG/DL (ref 40–59)
HGB BLD-MCNC: 14.3 G/DL
IMM GRANULOCYTES # BLD AUTO: 0.15 X10(3) UL (ref 0–1)
IMM GRANULOCYTES NFR BLD: 2.1 %
LDLC SERPL CALC-MCNC: 119 MG/DL (ref ?–100)
LYMPHOCYTES # BLD AUTO: 1.91 X10(3) UL (ref 1–4)
LYMPHOCYTES NFR BLD AUTO: 26.5 %
MCH RBC QN AUTO: 31 PG (ref 26–34)
MCHC RBC AUTO-ENTMCNC: 34.6 G/DL (ref 31–37)
MCV RBC AUTO: 89.4 FL
MONOCYTES # BLD AUTO: 0.68 X10(3) UL (ref 0.1–1)
MONOCYTES NFR BLD AUTO: 9.4 %
NEUTROPHILS # BLD AUTO: 4.39 X10 (3) UL (ref 1.5–7.7)
NEUTROPHILS # BLD AUTO: 4.39 X10(3) UL (ref 1.5–7.7)
NEUTROPHILS NFR BLD AUTO: 60.9 %
NONHDLC SERPL-MCNC: 140 MG/DL (ref ?–130)
OSMOLALITY SERPL CALC.SUM OF ELEC: 281 MOSM/KG (ref 275–295)
PLATELET # BLD AUTO: 368 10(3)UL (ref 150–450)
POTASSIUM SERPL-SCNC: 4.2 MMOL/L (ref 3.5–5.1)
PROT SERPL-MCNC: 7.7 G/DL (ref 5.7–8.2)
RBC # BLD AUTO: 4.62 X10(6)UL
SODIUM SERPL-SCNC: 135 MMOL/L (ref 136–145)
TRIGL SERPL-MCNC: 123 MG/DL (ref 30–149)
TSI SER-ACNC: 2.26 MIU/ML (ref 0.55–4.78)
VLDLC SERPL CALC-MCNC: 21 MG/DL (ref 0–30)
WBC # BLD AUTO: 7.2 X10(3) UL (ref 4–11)

## 2024-09-28 PROCEDURE — 83036 HEMOGLOBIN GLYCOSYLATED A1C: CPT

## 2024-09-28 PROCEDURE — 80053 COMPREHEN METABOLIC PANEL: CPT

## 2024-09-28 PROCEDURE — 36415 COLL VENOUS BLD VENIPUNCTURE: CPT

## 2024-09-28 PROCEDURE — 85025 COMPLETE CBC W/AUTO DIFF WBC: CPT

## 2024-09-28 PROCEDURE — 84443 ASSAY THYROID STIM HORMONE: CPT

## 2024-09-28 PROCEDURE — 80061 LIPID PANEL: CPT

## 2024-09-28 RX ORDER — OMEPRAZOLE 40 MG/1
CAPSULE, DELAYED RELEASE ORAL
Qty: 90 CAPSULE | Refills: 3 | Status: SHIPPED | OUTPATIENT
Start: 2024-09-28

## 2024-09-28 NOTE — TELEPHONE ENCOUNTER
LOV: 3/18/24   RTC: none noted  Filled: 10/9/23 #90 3 refill   Labs: 9/30/23   Future Appointments   Date Time Provider Department Center   9/28/2024 10:30 AM Sayda Smith MD [facility]

## 2024-09-30 ENCOUNTER — TELEPHONE (OUTPATIENT)
Dept: FAMILY MEDICINE CLINIC | Facility: CLINIC | Age: 64
End: 2024-09-30

## 2024-09-30 NOTE — TELEPHONE ENCOUNTER
Polypharmacy 161  Medication:gabapentin 300 MG Oral Cap More Information     View the most recent questions/answers  Our records show the patient is taking a drug that can increase the risk of adverse outcomes, including falls and fractures, cognitive decline, and/or delirium, when combined with the requested drug(s), according to the American Geriatric[employer] Society Beers criteria.

## 2024-10-01 RX ORDER — SIMVASTATIN 20 MG
20 TABLET ORAL NIGHTLY
Qty: 90 TABLET | Refills: 3 | Status: SHIPPED | OUTPATIENT
Start: 2024-10-01

## 2024-10-02 NOTE — TELEPHONE ENCOUNTER
Approved    Prior authorization approved  Payer: [employer] Case ID: [id number]    [phone]    [phone]  Note from payer: [place] Case: [id number], Status: Approved, Coverage Starts on: 1/1/2024 12:00:00 AM, Coverage Ends on: 12/31/2025 12:00:00 AM. Questions? Contact [phone].  Approval Details    Authorized from January 1, 2024 to December 31, 2025  Electronic appeal: Not supported    Pharmacy notified.

## 2024-10-08 DIAGNOSIS — G47.00 INSOMNIA, UNSPECIFIED TYPE: ICD-10-CM

## 2024-10-08 NOTE — TELEPHONE ENCOUNTER
Patient called to request a refill on below medication. [facility].       Medication Quantity Refills Start End   zolpidem 10 MG Oral Tab 90 tablet 1 3/18/2024 --   Sig:   Take 1 tablet (10 mg total) by mouth nightly as needed for Sleep. AT BEDTIME     Route:   Oral     Note to Pharmacy:   Not to exceed 4 additional fills before 03/28/2024 DX Code Needed  .     PRN Reason(s):   Sleep     Order #:   [id number]

## 2024-10-10 RX ORDER — ZOLPIDEM TARTRATE 10 MG/1
10 TABLET ORAL NIGHTLY PRN
Qty: 90 TABLET | Refills: 1 | Status: SHIPPED | OUTPATIENT
Start: 2024-10-10

## 2024-10-10 NOTE — TELEPHONE ENCOUNTER
Please review; protocol failed/ has no protocol      Recent fills: 07/17/2024  Last Rx written: 03/18/2024  Last Office Visit: 09/28/2024    Recent Visits  Date Type Provider Dept   09/28/24 Office Visit Sayda Smith MD [facility]         Requested Prescriptions   Pending Prescriptions Disp Refills    zolpidem 10 MG Oral Tab 90 tablet 1     Sig: Take 1 tablet (10 mg total) by mouth nightly as needed for Sleep. AT BEDTIME       Controlled Substance Medication Failed - 10/10/2024  9:37 AM        Failed - This medication is a controlled substance - forward to provider to refill           Recent Outpatient Visits              1 week ago Moderate episode of recurrent major depressive disorder (HCC)    [facility], Sayda Hernandez MD    Office Visit    3 weeks ago Facet syndrome, lumbar    [facility], [place] Behar, Alex, MD    Office Visit    2 months ago Lower back pain    [facility] [place]    Office Visit    3 months ago Lower back pain    [facility] [place]    Office Visit    3 months ago Lower back pain    [facility] [place]    Office Visit

## 2024-12-03 DIAGNOSIS — M48.02 FORAMINAL STENOSIS OF CERVICAL REGION: ICD-10-CM

## 2024-12-03 DIAGNOSIS — M79.18 MYALGIA OF MUSCLE OF NECK: ICD-10-CM

## 2024-12-03 DIAGNOSIS — M50.30 DEGENERATIVE DISC DISEASE, CERVICAL: ICD-10-CM

## 2024-12-03 DIAGNOSIS — R20.2 PARESTHESIAS: ICD-10-CM

## 2024-12-03 NOTE — TELEPHONE ENCOUNTER
Duloxetine is managed by Dr. Alex Behar    Zolpidem has refills 90-day plus 1 refill sent 10/10/24.

## 2024-12-03 NOTE — TELEPHONE ENCOUNTER
Patient requesting refill     [id number] [place], [place] - [address] across from [address],     Medication Detail    Medication Quantity Refills Start End   zolpidem 10 MG Oral Tab 90 tablet 1 10/10/2024 --   Sig:   Take 1 tablet (10 mg total) by mouth nightly as needed for Sleep. AT BEDTIME     Route:   Oral     Note to Pharmacy:   DX code G47.33     PRN Reason(s):   Sleep     Order #:   [id number]       Medication Detail    Medication Quantity Refills Start End   DULoxetine 30 MG Oral Cap DR Particles 60 capsule 0 9/17/2024 --   Sig:   Take 1 capsule (30 mg total) by mouth daily.     Route:   Oral     Order #:   [id number]       Medication Detail    Medication Quantity Refills Start End   HYDROcodone-acetaminophen (NORCO) 5-325 MG Oral Tab 60 tablet 0 9/28/2024 --   Sig:   Take 1 tablet by mouth every 8 (eight) hours as needed for Pain.     Route:   Oral     PRN Reason(s):   Pain     Earliest Fill Date:   9/28/2024     Order #:   [id number]

## 2024-12-06 RX ORDER — HYDROCODONE BITARTRATE AND ACETAMINOPHEN 5; 325 MG/1; MG/1
1 TABLET ORAL EVERY 8 HOURS PRN
Qty: 60 TABLET | Refills: 0 | Status: SHIPPED | OUTPATIENT
Start: 2024-12-06

## 2024-12-06 NOTE — TELEPHONE ENCOUNTER
Please review. Protocol Failed; No Protocol      Recent fills: 9/28/2024  Last Rx written: 9/28/2024  Last office visit: 9/28/2024          Requested Prescriptions   Pending Prescriptions Disp Refills    HYDROcodone-acetaminophen (NORCO) 5-325 MG Oral Tab 60 tablet 0     Sig: Take 1 tablet by mouth every 8 (eight) hours as needed for Pain.       Controlled Substance Medication Failed - 12/6/2024 10:47 AM        Failed - This medication is a controlled substance - forward to provider to refill                 Recent Outpatient Visits              2 months ago Moderate episode of recurrent major depressive disorder (HCC)    [facility], Sayda Hernandez MD    Office Visit    2 months ago Facet syndrome, lumbar    [facility], [place] Behar, Alex, MD    Office Visit    4 months ago Lower back pain    [facility] [place]    Office Visit    5 months ago Lower back pain    [facility] [place]    Office Visit    5 months ago Lower back pain    [facility] [place]    Office Visit

## 2024-12-07 DIAGNOSIS — F33.41 RECURRENT MAJOR DEPRESSIVE DISORDER, IN PARTIAL REMISSION (HCC): ICD-10-CM

## 2024-12-11 NOTE — TELEPHONE ENCOUNTER
Refill passed per [facility] protocol.    Please review pended refill request as unable to refill due to high/very high interaction warning copied here:  Drug-Drug: traMADol and sertralineSerotonergic effects may be additive when tramadol and selective serotonin reuptake inhibitors (SSRIs) are coadministered. The risk of serotonin syndrome/toxicity may be increased.  Details  Override reason        sertraline 25 MG Oral Tab [Pharmacy Med Name: SERTRALINE HCL 25 MG TABLET]  Prescription. Reordered.  traMADol 50 MG Oral TabPrescription. Active.  Discontinue  High  Drug-Drug: DULoxetine and sertralineAdditive serotonergic effects may occur during coadministration of duloxetine and selective serotonin reuptake inhibitors (SSRIs), and the risk of developing serotonin syndrome may be increased.  Details  Override reason        sertraline 25 MG Oral Tab [Pharmacy Med Name: SERTRALINE HCL 25 MG TABLET]  Prescription. Reordered.  DULoxetine 30 MG Oral Cap DR ParticlesPrescription. Active.  Discontinue  Medium  Duplicate Therapy: sertraline, DULoxetineSSRIS AND SNRIS. No Abuse/Dependency Potential.    Requested Prescriptions   Pending Prescriptions Disp Refills    SERTRALINE 25 MG Oral Tab [Pharmacy Med Name: SERTRALINE HCL 25 MG TABLET] 90 tablet 1     Sig: Take 1 tablet (25 mg total) by mouth daily.       Psychiatric Non-Scheduled (Anti-Anxiety) Passed - 12/11/2024  4:46 PM        Passed - In person appointment or virtual visit in the past 6 mos or appointment in next 3 mos     Recent Outpatient Visits              2 months ago Moderate episode of recurrent major depressive disorder (HCC)    [facility], Sayda Hernandez MD    Office Visit    2 months ago Facet syndrome, lumbar    [facility], Addison Behar, Alex, MD    Office Visit    4 months ago Lower back pain    [facility] [place], PEDRO    Office Visit     5 months ago Lower back pain    [place] [facility] [place], PT    Office Visit    5 months ago Lower back pain    [facility] [place], PT    Office Visit                      Passed - Depression Screening completed within the past 12 months             Recent Outpatient Visits              2 months ago Moderate episode of recurrent major depressive disorder (HCC)    [facility], Sayda Hernandez MD    Office Visit    2 months ago Facet syndrome, lumbar    [facility], [place] Behar, Alex, MD    Office Visit    4 months ago Lower back pain    [place] [facility] [place], PT    Office Visit    5 months ago Lower back pain    [facility] [place], PT    Office Visit    5 months ago Lower back pain    [facility] [place], PT    Office Visit

## 2024-12-12 ENCOUNTER — TELEPHONE (OUTPATIENT)
Dept: PHYSICAL MEDICINE AND REHAB | Facility: CLINIC | Age: 64
End: 2024-12-12

## 2024-12-12 RX ORDER — SERTRALINE HYDROCHLORIDE 25 MG/1
25 TABLET, FILM COATED ORAL DAILY
Qty: 90 TABLET | Refills: 3 | Status: SHIPPED | OUTPATIENT
Start: 2024-12-12

## 2024-12-12 NOTE — TELEPHONE ENCOUNTER
Language Line  # Cornelius [phone]:  Patient is calling for Duloxetine refill.  Advised patient that Duloxetine is prescribed by Dr Behar. Patient transferred to Dr Behar's office to request refill.

## 2024-12-16 DIAGNOSIS — M54.16 LUMBAR RADICULOPATHY: ICD-10-CM

## 2024-12-16 DIAGNOSIS — M47.816 LUMBAR SPONDYLOSIS: ICD-10-CM

## 2024-12-16 DIAGNOSIS — F33.1 MODERATE EPISODE OF RECURRENT MAJOR DEPRESSIVE DISORDER (HCC): ICD-10-CM

## 2024-12-16 DIAGNOSIS — M54.59 MECHANICAL LOW BACK PAIN: ICD-10-CM

## 2024-12-16 DIAGNOSIS — E78.5 HYPERLIPIDEMIA, UNSPECIFIED HYPERLIPIDEMIA TYPE: ICD-10-CM

## 2024-12-16 DIAGNOSIS — I10 ESSENTIAL HYPERTENSION: ICD-10-CM

## 2024-12-16 DIAGNOSIS — M47.816 FACET SYNDROME, LUMBAR: ICD-10-CM

## 2024-12-16 DIAGNOSIS — M79.10 MYALGIA: ICD-10-CM

## 2024-12-16 DIAGNOSIS — M51.369 BULGE OF LUMBAR DISC WITHOUT MYELOPATHY: ICD-10-CM

## 2024-12-16 DIAGNOSIS — M48.061 LUMBAR FORAMINAL STENOSIS: ICD-10-CM

## 2024-12-16 DIAGNOSIS — M79.7 FIBROMYALGIA: ICD-10-CM

## 2024-12-16 DIAGNOSIS — M51.379 DDD (DEGENERATIVE DISC DISEASE), LUMBOSACRAL: ICD-10-CM

## 2024-12-16 RX ORDER — DULOXETIN HYDROCHLORIDE 30 MG/1
30 CAPSULE, DELAYED RELEASE ORAL DAILY
Qty: 90 CAPSULE | Refills: 0 | OUTPATIENT
Start: 2024-12-16

## 2024-12-16 RX ORDER — DULOXETIN HYDROCHLORIDE 30 MG/1
30 CAPSULE, DELAYED RELEASE ORAL DAILY
Qty: 60 CAPSULE | Refills: 0 | Status: SHIPPED | OUTPATIENT
Start: 2024-12-16

## 2024-12-16 NOTE — TELEPHONE ENCOUNTER
Refill Request    Medication request: DULoxetine 30 MG Oral Cap DR Particles Take 1 capsule (30 mg total) by mouth daily     LOV:9/17/2024 Behar, Alex, MD   Due back to clinic per last office note:  RTC in 2 to 3 weeks after procedure   NOV: Visit date not found      [place]: 10/10/2024 #60    Urine drug screen (if applicable): n/a  Pain contract: n/a    LOV plan (if weaning or changing medications): \"I am also recommending she continue the duloxetine and gabapentin. I will start her on Flexeril as well for muscle relaxers as she may have a component of pelvic floor hypertonicity. \"    Several interaction pop-ups- routed to Dr. Behar to approve.

## 2024-12-16 NOTE — TELEPHONE ENCOUNTER
Patient calling to request a status on the refill for Duloxetine 30 MG, this was send on 12/12/24. Please call to advice once refill order goes to pharmacy.

## 2024-12-26 ENCOUNTER — PATIENT OUTREACH (OUTPATIENT)
Dept: CASE MANAGEMENT | Age: 64
End: 2024-12-26

## 2024-12-26 NOTE — PROCEDURES
The office order to remove patient from the diabetes registry is Denied and finalized on December 26, 2024.      Diabetes registry is being denied for the following reason(s):    Last A1c lab was done on 9/28/2024 and A1c lab value was 6.5 in diabetic range           In order for a Diabetes Condition to be considered resolved, the patient must meet the below three (3) criteria's and for a minimum of 12 consecutive months:      1. Office visit and A1C labs have no diabetes related diagnosis code submitted to the insurance company.     2. Patient has had A1c's less than 6.5% for 12 consecutive months (from the last A1C <6.5)     3. Patient has not been on any diabetes medications   Excludes diabetic medications clearly documented for weight loss medications.

## 2025-01-07 ENCOUNTER — TELEPHONE (OUTPATIENT)
Dept: FAMILY MEDICINE CLINIC | Facility: CLINIC | Age: 65
End: 2025-01-07

## 2025-01-07 DIAGNOSIS — Z23 NEED FOR SHINGLES VACCINE: Primary | ICD-10-CM

## 2025-01-07 RX ORDER — ZOSTER VACCINE RECOMBINANT, ADJUVANTED 50 MCG/0.5
0.5 KIT INTRAMUSCULAR ONCE
Qty: 1 EACH | Refills: 0 | Status: SHIPPED | OUTPATIENT
Start: 2025-01-07 | End: 2025-01-07

## 2025-01-07 NOTE — TELEPHONE ENCOUNTER
Patient called and is asking if order for shingles can be sent to pharmacy       [id number] [place], [place] - [address] AT [facility] from [place],

## 2025-01-07 NOTE — TELEPHONE ENCOUNTER
Spoke to patient and informed her script sent to pharmacy and she voiced understanding. Patient requesting script for  also, TE created and routed to provider.

## 2025-01-07 NOTE — TELEPHONE ENCOUNTER
1. Need for shingles vaccine    - Zoster Vac Recomb Adjuvanted (SHINGRIX) 50 MCG/0.5ML Intramuscular Recon Susp; Inject 0.5 mL into the muscle one time for 1 dose.  Dispense: 1 each; Refill: 0

## 2025-01-21 ENCOUNTER — TELEPHONE (OUTPATIENT)
Dept: FAMILY MEDICINE CLINIC | Facility: CLINIC | Age: 65
End: 2025-01-21

## 2025-01-21 NOTE — TELEPHONE ENCOUNTER
Patient is requesting refill for telmisartan 40 MG Oral Tab   Is out of medication     [id number] [place], [place] - [address] [place] [address] AT across from [address], [phone], [phone]

## 2025-01-21 NOTE — TELEPHONE ENCOUNTER
Called [facility]  Confirmed patient name and date of birth     Per pharmacist at [facility]     Telmisartan medication is ready for pickup and confirmed with pharmacist after this refill patient will have 1 more refill remaining for (90 days).    Using language line    ID : [id number]   Name: Lisa    Patient name and Date of birth confirmed  Was trying to reach patient to relay patient Telmisartan is at patient [facility] and is ready for pickup     Per Lisa patient does not have voicemail box set up- unable to leave voice message and patient did not answer call.

## 2025-01-21 NOTE — TELEPHONE ENCOUNTER
Called Language Line again to try the home phone and Daughter (Breann who is on verbal release)    Junior [id number]  Confirmed patient name and phone number    Junior tried patient home phone number and it stated number was invalid    Genisis tried patient daughter (Breann) phone number said not in service.    See previous phone call made with language line to patient cell ( patient cell voicemail was not set up)     Made all attempts to contact patient to relay medication is ready for pickup - unable to reach patient

## 2025-01-21 NOTE — TELEPHONE ENCOUNTER
Disp Refills Start End    telmisartan 40 MG Oral Tab 90 tablet 3 7/23/2024 --    Sig - Route: Take 1 tablet (40 mg total) by mouth daily. VIVIISAIAS FELIPE REAL - Oral    Sent to pharmacy as: Telmisartan 40 MG Oral Tablet (Micardis)    E-Prescribing Status: Receipt confirmed by pharmacy (7/23/2024 11:24 AM CDT)      Associated Diagnoses    Essential hypertension        Pharmacy    [id number] [place], [place] - [address] [place] [address] AT [facility] FROM [address], [phone], [phone]

## 2025-02-07 DIAGNOSIS — M54.59 MECHANICAL LOW BACK PAIN: ICD-10-CM

## 2025-02-07 DIAGNOSIS — M48.061 LUMBAR FORAMINAL STENOSIS: ICD-10-CM

## 2025-02-07 DIAGNOSIS — M51.379 DDD (DEGENERATIVE DISC DISEASE), LUMBOSACRAL: ICD-10-CM

## 2025-02-07 DIAGNOSIS — M79.7 FIBROMYALGIA: ICD-10-CM

## 2025-02-07 DIAGNOSIS — E78.5 HYPERLIPIDEMIA, UNSPECIFIED HYPERLIPIDEMIA TYPE: ICD-10-CM

## 2025-02-07 DIAGNOSIS — F33.1 MODERATE EPISODE OF RECURRENT MAJOR DEPRESSIVE DISORDER (HCC): ICD-10-CM

## 2025-02-07 DIAGNOSIS — M47.816 FACET SYNDROME, LUMBAR: ICD-10-CM

## 2025-02-07 DIAGNOSIS — M47.816 LUMBAR SPONDYLOSIS: ICD-10-CM

## 2025-02-07 DIAGNOSIS — M51.369 BULGE OF LUMBAR DISC WITHOUT MYELOPATHY: ICD-10-CM

## 2025-02-07 DIAGNOSIS — M54.16 LUMBAR RADICULOPATHY: ICD-10-CM

## 2025-02-07 DIAGNOSIS — M19.90 OSTEOARTHRITIS, UNSPECIFIED OSTEOARTHRITIS TYPE, UNSPECIFIED SITE: Primary | ICD-10-CM

## 2025-02-07 DIAGNOSIS — M79.10 MYALGIA: ICD-10-CM

## 2025-02-07 DIAGNOSIS — I10 ESSENTIAL HYPERTENSION: ICD-10-CM

## 2025-02-10 RX ORDER — DULOXETIN HYDROCHLORIDE 30 MG/1
30 CAPSULE, DELAYED RELEASE ORAL DAILY
Qty: 60 CAPSULE | Refills: 0 | Status: SHIPPED | OUTPATIENT
Start: 2025-02-15

## 2025-02-10 NOTE — TELEPHONE ENCOUNTER
Refill Request    Medication request: DULOXETINE 30 MG Oral Cap DR Particles . TOME 1 CAPSULA POR VIA ORAL TODOS LOS REAL     LOV:9/17/2024 Behar, Alex, MD   Due back to clinic per last office note: Per Dr. Behar: \"RTC in 2 to 3 weeks after procedure.\"  NOV: Visit date not found      [place]: 12/18/2024 #60 -60 [place].  Earliest fill date: 02/16/2025. Order changed to reflect this.    Urine drug screen (if applicable): n/a  Pain contract: n/a    LOV plan (if weaning or changing medications): Per Dr. Behar: \"I am also recommending she continue the duloxetine and gabapentin\"        Multiple RX warnings when attempting to sign for Duloxetine. Per protocol, routed to physician for his review/signature if appropriate.

## 2025-04-03 ENCOUNTER — TELEPHONE (OUTPATIENT)
Dept: FAMILY MEDICINE CLINIC | Facility: CLINIC | Age: 65
End: 2025-04-03

## 2025-04-03 DIAGNOSIS — G47.00 INSOMNIA, UNSPECIFIED TYPE: ICD-10-CM

## 2025-04-03 NOTE — TELEPHONE ENCOUNTER
Patient is requesting an appointment for a follow up and medication check/refill. Declined aprn.

## 2025-04-03 NOTE — TELEPHONE ENCOUNTER
Patient is requesting refill for zolpidem 10 MG Oral Tab , SIMVASTATIN 20 MG Oral Tab ,    telmisartan 40 MG Oral Tab   , and gabapentin 300 MG Oral Cap   And alendronate 70 MG Oral Tab     [id number] - [place], [place] - [address] [place] [address] AT across from [address], [phone], [phone]

## 2025-04-03 NOTE — TELEPHONE ENCOUNTER
Spoke to patient, appointment scheduled. Patient will call to change to virtual if she does not find transportation.

## 2025-04-03 NOTE — TELEPHONE ENCOUNTER
Simvastatin 20m year supply sent to [facility] in [place] on 10/01/2024    Telmisartan 40m year supply sent to [facility] in [place] on 2024    Gabapentin 300m year supply sent to [facility] in [place] on 2024    Alendronate 70m year supply sent to [facility] in [place] on 2024    Zolpidem 10mg: called pharmacy and 1 refill was left, they will fill for patient.

## 2025-04-17 ENCOUNTER — OFFICE VISIT (OUTPATIENT)
Dept: FAMILY MEDICINE CLINIC | Facility: CLINIC | Age: 65
End: 2025-04-17

## 2025-04-17 VITALS
SYSTOLIC BLOOD PRESSURE: 173 MMHG | BODY MASS INDEX: 26 KG/M2 | HEART RATE: 83 BPM | DIASTOLIC BLOOD PRESSURE: 99 MMHG | WEIGHT: 131 LBS

## 2025-04-17 DIAGNOSIS — E55.9 VITAMIN D DEFICIENCY: ICD-10-CM

## 2025-04-17 DIAGNOSIS — Z12.31 SCREENING MAMMOGRAM FOR BREAST CANCER: ICD-10-CM

## 2025-04-17 DIAGNOSIS — E78.5 HYPERLIPIDEMIA, UNSPECIFIED HYPERLIPIDEMIA TYPE: ICD-10-CM

## 2025-04-17 DIAGNOSIS — F33.1 MODERATE EPISODE OF RECURRENT MAJOR DEPRESSIVE DISORDER (HCC): ICD-10-CM

## 2025-04-17 DIAGNOSIS — M54.50 CHRONIC LOW BACK PAIN WITHOUT SCIATICA, UNSPECIFIED BACK PAIN LATERALITY: ICD-10-CM

## 2025-04-17 DIAGNOSIS — G89.29 CHRONIC LOW BACK PAIN WITHOUT SCIATICA, UNSPECIFIED BACK PAIN LATERALITY: ICD-10-CM

## 2025-04-17 DIAGNOSIS — M19.90 OSTEOARTHRITIS, UNSPECIFIED OSTEOARTHRITIS TYPE, UNSPECIFIED SITE: ICD-10-CM

## 2025-04-17 DIAGNOSIS — G47.00 INSOMNIA, UNSPECIFIED TYPE: ICD-10-CM

## 2025-04-17 DIAGNOSIS — I10 ESSENTIAL HYPERTENSION: Primary | ICD-10-CM

## 2025-04-17 DIAGNOSIS — E11.9 TYPE 2 DIABETES MELLITUS WITHOUT COMPLICATION, WITHOUT LONG-TERM CURRENT USE OF INSULIN (HCC): ICD-10-CM

## 2025-04-17 PROCEDURE — 1160F RVW MEDS BY RX/DR IN RCRD: CPT | Performed by: FAMILY MEDICINE

## 2025-04-17 PROCEDURE — 99214 OFFICE O/P EST MOD 30 MIN: CPT | Performed by: FAMILY MEDICINE

## 2025-04-17 PROCEDURE — 1159F MED LIST DOCD IN RCRD: CPT | Performed by: FAMILY MEDICINE

## 2025-04-17 PROCEDURE — 96372 THER/PROPH/DIAG INJ SC/IM: CPT | Performed by: FAMILY MEDICINE

## 2025-04-17 RX ORDER — TIRZEPATIDE 2.5 MG/.5ML
2.5 INJECTION, SOLUTION SUBCUTANEOUS WEEKLY
Qty: 2 ML | Refills: 2 | Status: SHIPPED | OUTPATIENT
Start: 2025-04-17

## 2025-04-17 RX ORDER — KETOROLAC TROMETHAMINE 30 MG/ML
60 INJECTION, SOLUTION INTRAMUSCULAR; INTRAVENOUS ONCE
Status: COMPLETED | OUTPATIENT
Start: 2025-04-17 | End: 2025-04-17

## 2025-04-17 RX ORDER — TRAMADOL HYDROCHLORIDE 50 MG/1
50 TABLET ORAL EVERY 6 HOURS PRN
Qty: 60 TABLET | Refills: 0 | Status: SHIPPED | OUTPATIENT
Start: 2025-04-17

## 2025-04-17 RX ORDER — ZOLPIDEM TARTRATE 10 MG/1
10 TABLET ORAL NIGHTLY PRN
Qty: 90 TABLET | Refills: 1 | Status: SHIPPED | OUTPATIENT
Start: 2025-04-17

## 2025-04-17 RX ORDER — DULOXETIN HYDROCHLORIDE 30 MG/1
30 CAPSULE, DELAYED RELEASE ORAL DAILY
Qty: 90 CAPSULE | Refills: 1 | Status: SHIPPED | OUTPATIENT
Start: 2025-04-17

## 2025-04-17 RX ORDER — GABAPENTIN 300 MG/1
CAPSULE ORAL
Qty: 450 CAPSULE | Refills: 3 | Status: SHIPPED | OUTPATIENT
Start: 2025-04-17

## 2025-04-17 RX ADMIN — KETOROLAC TROMETHAMINE 60 MG: 30 INJECTION, SOLUTION INTRAMUSCULAR; INTRAVENOUS at 17:36:00

## 2025-04-17 NOTE — PROGRESS NOTES
Chief Complaint   Patient presents with    Medication Follow-Up     HPI:   Jessy Reich is a 65 year old female who presents to clinic with complaints of persistent back pain.  She has had steroid injections to the back with some relief of symptoms that tapers off she would like me to start refilling her duloxetine and other pain medications.  Blood pressure elevated in the office today no chest pain shortness of breath or dizziness.  She needs a mammogram and a dilated eye exam.        REVIEW OF SYSTEMS:   Negative, except per HPI.     EXAM:   BP (!) 173/99 (BP Location: Left arm, Patient Position: Sitting, Cuff Size: adult)   Pulse 83   Wt 131 lb (59.4 kg)   BMI 26.46 kg/m²   Body mass index is 26.46 kg/m².  GENERAL: well developed, well nourished, in no apparent distress  SKIN: no rashes, no suspicious lesions  HEENT: atraumatic, normocephalic  EYES: PERRLA, EOMI,conjunctiva are clear  NECK: supple, no adenopathy    ASSESSMENT AND PLAN:     1. Essential hypertension  - Stable condition, continue present management.      2. Insomnia, unspecified type  [place].  Appropriate use discussed with patient.  - zolpidem 10 MG Oral Tab; Take 1 tablet (10 mg total) by mouth nightly as needed for Sleep. AT BEDTIME  Dispense: 90 tablet; Refill: 1    3. Osteoarthritis, unspecified osteoarthritis type, unspecified site  - gabapentin 300 MG Oral Cap; TAKE 2 CAPSULES BY MOUTH EVERY MORNING AND TAKE 3 CAPSULES BY MOUTH IN THE EVENING  Dispense: 450 capsule; Refill: 3  - traMADol 50 MG Oral Tab; Take 1 tablet (50 mg total) by mouth every 6 (six) hours as needed for Pain.  Dispense: 60 tablet; Refill: 0  - DULoxetine 30 MG Oral Cap DR Particles; Take 1 capsule (30 mg total) by mouth daily.  Dispense: 90 capsule; Refill: 1    4. Chronic low back pain without sciatica, unspecified back pain laterality  - gabapentin 300 MG Oral Cap; TAKE 2 CAPSULES BY MOUTH EVERY MORNING AND TAKE 3 CAPSULES  BY MOUTH IN THE EVENING  Dispense: 450 capsule; Refill: 3  - ketorolac (Toradol) 60 MG/2ML IM injection 60 mg  - traMADol 50 MG Oral Tab; Take 1 tablet (50 mg total) by mouth every 6 (six) hours as needed for Pain.  Dispense: 60 tablet; Refill: 0    5. Type 2 diabetes mellitus without complication, without long-term current use of insulin (HCC)  - Stable condition, continue present management.    Starting mounjaro as pt also c/w gaining wt.   - Comp Metabolic Panel (14); Future  - Hemoglobin A1C; Future  - Lipid Panel; Future  - Microalb/Creat Ratio, Random Urine; Future  - Tirzepatide (MOUNJARO) 2.5 MG/0.5ML Subcutaneous Solution Auto-injector; Inject 2.5 mg into the skin once a week.  Dispense: 2 mL; Refill: 2  - OPHTHALMOLOGY - INTERNAL    6. Screening mammogram for breast cancer  - [facility] IREEN 2D+3D SCREENING BILAT (CPT=77067/91958); Future    7. Moderate episode of recurrent major depressive disorder (HCC)  - DULoxetine 30 MG Oral Cap DR Particles; Take 1 capsule (30 mg total) by mouth daily.  Dispense: 90 capsule; Refill: 1    8. Hyperlipidemia, unspecified hyperlipidemia type  - DULoxetine 30 MG Oral Cap DR Particles; Take 1 capsule (30 mg total) by mouth daily.  Dispense: 90 capsule; Refill: 1    9. Vitamin D deficiency  Cont supp     RTC if no improvement in symptoms. Red flags discussed to go to ER.     Sayda Smith MD  4/17/2025  5:13 PM

## 2025-05-02 ENCOUNTER — TELEPHONE (OUTPATIENT)
Dept: FAMILY MEDICINE CLINIC | Facility: CLINIC | Age: 65
End: 2025-05-02

## 2025-05-02 NOTE — TELEPHONE ENCOUNTER
Patient called and is asking if medication has been approved by insurance       Medication Detail    Medication Quantity Refills Start End   Tirzepatide (MOUNJARO) 2.5 MG/0.5ML Subcutaneous Solution Auto-injector 2 mL 2 4/17/2025 --   Sig:   Inject 2.5 mg into the skin once a week.     Route:   Subcutaneous     Order #:   [id number]

## 2025-05-03 ENCOUNTER — LAB ENCOUNTER (OUTPATIENT)
Dept: LAB | Age: 65
End: 2025-05-03
Attending: FAMILY MEDICINE
Payer: MEDICARE

## 2025-05-03 DIAGNOSIS — E11.9 TYPE 2 DIABETES MELLITUS WITHOUT COMPLICATION, WITHOUT LONG-TERM CURRENT USE OF INSULIN (HCC): ICD-10-CM

## 2025-05-03 LAB
ALBUMIN SERPL-MCNC: 4.6 G/DL (ref 3.2–4.8)
ALBUMIN/GLOB SERPL: 1.8 {RATIO} (ref 1–2)
ALP LIVER SERPL-CCNC: 65 U/L (ref 55–142)
ALT SERPL-CCNC: 13 U/L (ref 10–49)
ANION GAP SERPL CALC-SCNC: 9 MMOL/L (ref 0–18)
AST SERPL-CCNC: 19 U/L (ref ?–34)
BILIRUB SERPL-MCNC: 0.6 MG/DL (ref 0.2–1.1)
BUN BLD-MCNC: 14 MG/DL (ref 9–23)
BUN/CREAT SERPL: 16.3 (ref 10–20)
CALCIUM BLD-MCNC: 9.7 MG/DL (ref 8.7–10.4)
CHLORIDE SERPL-SCNC: 102 MMOL/L (ref 98–112)
CHOLEST SERPL-MCNC: 189 MG/DL (ref ?–200)
CO2 SERPL-SCNC: 28 MMOL/L (ref 21–32)
CREAT BLD-MCNC: 0.86 MG/DL (ref 0.55–1.02)
CREAT UR-SCNC: 137.1 MG/DL
EGFRCR SERPLBLD CKD-EPI 2021: 74 ML/MIN/1.73M2 (ref 60–?)
EST. AVERAGE GLUCOSE BLD GHB EST-MCNC: 134 MG/DL (ref 68–126)
FASTING PATIENT LIPID ANSWER: YES
FASTING STATUS PATIENT QL REPORTED: YES
GLOBULIN PLAS-MCNC: 2.6 G/DL (ref 2–3.5)
GLUCOSE BLD-MCNC: 135 MG/DL (ref 70–99)
HBA1C MFR BLD: 6.3 % (ref ?–5.7)
HDLC SERPL-MCNC: 75 MG/DL (ref 40–59)
LDLC SERPL CALC-MCNC: 99 MG/DL (ref ?–100)
MICROALBUMIN UR-MCNC: <0.3 MG/DL
NONHDLC SERPL-MCNC: 114 MG/DL (ref ?–130)
OSMOLALITY SERPL CALC.SUM OF ELEC: 291 MOSM/KG (ref 275–295)
POTASSIUM SERPL-SCNC: 4.1 MMOL/L (ref 3.5–5.1)
PROT SERPL-MCNC: 7.2 G/DL (ref 5.7–8.2)
SODIUM SERPL-SCNC: 139 MMOL/L (ref 136–145)
TRIGL SERPL-MCNC: 86 MG/DL (ref 30–149)
VLDLC SERPL CALC-MCNC: 14 MG/DL (ref 0–30)

## 2025-05-03 PROCEDURE — 36415 COLL VENOUS BLD VENIPUNCTURE: CPT

## 2025-05-03 PROCEDURE — 82570 ASSAY OF URINE CREATININE: CPT

## 2025-05-03 PROCEDURE — 80053 COMPREHEN METABOLIC PANEL: CPT

## 2025-05-03 PROCEDURE — 82043 UR ALBUMIN QUANTITATIVE: CPT

## 2025-05-03 PROCEDURE — 83036 HEMOGLOBIN GLYCOSYLATED A1C: CPT

## 2025-05-03 PROCEDURE — 80061 LIPID PANEL: CPT

## 2025-05-03 NOTE — TELEPHONE ENCOUNTER
Prior Authorization History  Tirzepatide (MOUNJARO) 2.5 MG/0.5ML Subcutaneous Solution Auto-injector     History    View all authorizations for this medication  Closed   5/3/2025  4:11 PM  Close reason: Prior Authorization not required for patient/medication   Note from payer: The patient currently has access to the requested medication and a Prior Authorization is not needed for the patient/medication.   Payer: [facility]    [phone]    [phone]

## 2025-05-12 ENCOUNTER — TELEPHONE (OUTPATIENT)
Dept: FAMILY MEDICINE CLINIC | Facility: CLINIC | Age: 65
End: 2025-05-12

## 2025-05-12 NOTE — TELEPHONE ENCOUNTER
Patient, would like a call back with her blood and urine test results. Patient is requesting a return call in Amharic.

## 2025-05-13 NOTE — TELEPHONE ENCOUNTER
Please inform patient that her liver, kidney function and diabetes urine test are normal.  Her diabetes is well-controlled.  We can repeat testing in 6 months.  Thank you

## 2025-05-13 NOTE — TELEPHONE ENCOUNTER
Spoke to patient, informed her of note below and she voiced understanding. Copy of results mailed to home address as requested.

## 2025-06-13 ENCOUNTER — NURSE TRIAGE (OUTPATIENT)
Dept: FAMILY MEDICINE CLINIC | Facility: CLINIC | Age: 65
End: 2025-06-13

## 2025-06-13 NOTE — TELEPHONE ENCOUNTER
With Greek interpretor ID #[id number]. Patient is having left leg pain for a long time but now the pain has increase  the last 2 weeks.  The leg pain can be  a 8/10 . The pain is constant but sometimes the pain is more. There is no redness or swelling. Patient was inform she should go to immediate for a evaluation. Patient stated that she will apply something warm and take pain medication if it continues then she will go to the immediate care.     Per patient last year she did received a injection on her back for her pain and it feels like its coming back. She will monitor for now.      Reason for Disposition   Patient sounds very sick or weak to the triager    Protocols used: Leg Pain-A-OH

## 2025-07-16 ENCOUNTER — HOSPITAL ENCOUNTER (OUTPATIENT)
Dept: MAMMOGRAPHY | Age: 65
Discharge: HOME | End: 2025-07-16
Attending: FAMILY MEDICINE
Payer: MEDICARE

## 2025-07-16 ENCOUNTER — TELEPHONE (OUTPATIENT)
Dept: FAMILY MEDICINE CLINIC | Facility: CLINIC | Age: 65
End: 2025-07-16

## 2025-07-16 ENCOUNTER — HOSPITAL ENCOUNTER (OUTPATIENT)
Dept: BONE DENSITY | Age: 65
Discharge: HOME | End: 2025-07-16
Attending: FAMILY MEDICINE
Payer: MEDICARE

## 2025-07-16 DIAGNOSIS — Z78.0 POST-MENOPAUSAL: ICD-10-CM

## 2025-07-16 DIAGNOSIS — Z12.31 SCREENING MAMMOGRAM FOR BREAST CANCER: ICD-10-CM

## 2025-07-16 PROCEDURE — 77063 BREAST TOMOSYNTHESIS BI: CPT | Performed by: FAMILY MEDICINE

## 2025-07-16 PROCEDURE — 77080 DXA BONE DENSITY AXIAL: CPT | Performed by: FAMILY MEDICINE

## 2025-07-16 PROCEDURE — 77067 SCR MAMMO BI INCL CAD: CPT | Performed by: FAMILY MEDICINE

## 2025-07-16 NOTE — TELEPHONE ENCOUNTER
Patient calling to request refill, only has enough for today, [facility] [place]     Current Outpatient Medications   Medication Sig Dispense Refill    DULoxetine 30 MG Oral Cap DR Particles Take 1 capsule (30 mg total) by mouth daily. 90 capsule 1

## 2025-07-16 NOTE — TELEPHONE ENCOUNTER
Outpatient Medication Detail     Disp Refills Start End    DULoxetine 30 MG Oral Cap DR Particles 90 capsule 1 4/17/2025 --    Sig - Route: Take 1 capsule (30 mg total) by mouth daily. - Oral    Sent to pharmacy as: DULoxetine HCl 30 MG Oral Capsule Delayed Release Particles (Cymbalta)    Notes to Pharmacy: DX Code Needed: M19.90    E-Prescribing Status: Receipt confirmed by pharmacy (4/17/2025  5:12 PM CDT)    No prior authorization was found for this prescription.    Found prior authorization for another prescription for the same medication: Closed - Other      Associated Diagnoses    Essential hypertension  - Primary      Osteoarthritis, unspecified osteoarthritis type, unspecified site      Moderate episode of recurrent major depressive disorder (HCC)      Hyperlipidemia, unspecified hyperlipidemia type        Pharmacy    [id number] [place], [place] - [address] [place] [address] AT [facility] FROM [address], [phone], [phone]

## 2025-07-22 RX ORDER — OMEPRAZOLE 40 MG/1
CAPSULE, DELAYED RELEASE ORAL
Qty: 90 CAPSULE | Refills: 3 | Status: SHIPPED | OUTPATIENT
Start: 2025-07-22

## 2025-07-22 NOTE — TELEPHONE ENCOUNTER
Refill Per Protocol     Requested Prescriptions   Pending Prescriptions Disp Refills    Omeprazole 40 MG Oral Capsule Delayed Release 90 capsule 3     Sig: TOME CATHY CAPSULA TODOS LOS REAL       Gastrointestional Medication Protocol Passed - 7/22/2025  3:46 PM        Passed - In person appointment or virtual visit in the past 12 mos or appointment in next 3 mos     Recent Outpatient Visits              3 [facility]    [facility]    9 months ago Moderate episode of recurrent major depressive disorder (HCC)    [facility], Sayda Hernandez MD    Office Visit    10 months ago Facet syndrome, lumbar    [facility] Behar, Alex, MD    Office Visit    1 year ago Lower back pain    [facility] [place], PT    Office Visit    1 year ago Lower back pain    [facility] [place]    Office Visit          Future Appointments         Provider Department Appt Notes    In 3 months Sayda Smith MD [facility]                     Passed - Medication is active on med list

## 2025-07-22 NOTE — TELEPHONE ENCOUNTER
Patient called requesting a refill on the following medication:    OMEPRAZOLE 40 MG Oral Capsule Delayed Release     Per patient she has two pills left. Please send refill to the following pharmacy:    [facility]    [address] [place] [address]

## 2025-07-31 DIAGNOSIS — I10 ESSENTIAL HYPERTENSION: ICD-10-CM

## 2025-08-04 RX ORDER — TELMISARTAN 40 MG/1
40 TABLET ORAL DAILY
Qty: 90 TABLET | Refills: 3 | Status: SHIPPED | OUTPATIENT
Start: 2025-08-04

## (undated) DIAGNOSIS — M19.90 OSTEOARTHRITIS, UNSPECIFIED OSTEOARTHRITIS TYPE, UNSPECIFIED SITE: ICD-10-CM

## (undated) DIAGNOSIS — E78.00 PURE HYPERCHOLESTEROLEMIA: ICD-10-CM

## (undated) DIAGNOSIS — M48.061 SPINAL STENOSIS OF LUMBAR REGION WITHOUT NEUROGENIC CLAUDICATION: ICD-10-CM

## (undated) DIAGNOSIS — I10 ESSENTIAL HYPERTENSION: ICD-10-CM

## (undated) DEVICE — Device: Brand: DEFENDO AIR/WATER/SUCTION AND BIOPSY VALVE

## (undated) DEVICE — NEEDLE HPO 18GA 1.5IN ECLPS

## (undated) DEVICE — PAD THRP WRPON FM BCK XL UNV

## (undated) DEVICE — PROXIMATE RH ROTATING HEAD SKIN STAPLERS (35 WIDE) CONTAINS 35 STAINLESS STEEL STAPLES: Brand: PROXIMATE

## (undated) DEVICE — TUBING IRR 16FT CNT WV 3 ASCP

## (undated) DEVICE — SUTURE ETHILON 3-0 669H

## (undated) DEVICE — DRAPE SRG 70X60IN SPLT U IMPRV

## (undated) DEVICE — CHLORAPREP 26ML APPLICATOR

## (undated) DEVICE — INTENDED FOR TISSUE SEPARATION, AND OTHER PROCEDURES THAT REQUIRE A SHARP SURGICAL BLADE TO PUNCTURE OR CUT.: Brand: BARD-PARKER ® STAINLESS STEEL BLADES

## (undated) DEVICE — SOL NACL IRRIG 0.9% 1000ML BTL

## (undated) DEVICE — ENSEAL X1 TISSUE SEALER, CURVED JAW, 25 CM SHAFT LENGTH: Brand: ENSEAL

## (undated) DEVICE — SUT PDS II 4-0 SH Z315H

## (undated) DEVICE — SUT SILK 2-0 SA85H

## (undated) DEVICE — 3M™ IOBAN™ 2 ANTIMICROBIAL INCISE DRAPE 6650EZ: Brand: IOBAN™ 2

## (undated) DEVICE — DRAPE SHEET LAPCHOLE 124X100X7

## (undated) DEVICE — SUT PDS II 0 CT-1 Z340H

## (undated) DEVICE — KIT ENDO ORCAPOD 160/180/190

## (undated) DEVICE — STANDARD HYPODERMIC NEEDLE,POLYPROPYLENE HUB: Brand: MONOJECT

## (undated) DEVICE — DRAPE SHEET LARGE 76X55

## (undated) DEVICE — CANNULA 8.5MM TWIST AR-6530

## (undated) DEVICE — A P RESECTION: Brand: MEDLINE INDUSTRIES, INC.

## (undated) DEVICE — SUT SILK 0 A306H

## (undated) DEVICE — ENDOSCOPY PACK - LOWER: Brand: MEDLINE INDUSTRIES, INC.

## (undated) DEVICE — BURR SHVR COOLCUT 13CM 4MM 8

## (undated) DEVICE — NON-ADHERENT DRESSING: Brand: TELFA

## (undated) DEVICE — SHOULDER ARTHROSCOPY: Brand: MEDLINE INDUSTRIES, INC.

## (undated) DEVICE — SUT SILK 3-0 SH K832H

## (undated) DEVICE — AMBIENT SUPER TURBOVAC 90 IFS: Brand: COBLATION

## (undated) DEVICE — GAMMEX® PI HYBRID SIZE 7.5, STERILE POWDER-FREE SURGICAL GLOVE, POLYISOPRENE AND NEOPRENE BLEND: Brand: GAMMEX

## (undated) DEVICE — BLADE SHVR COOLCUT 13CM 4MM

## (undated) DEVICE — 60 ML SYRINGE REGULAR TIP: Brand: MONOJECT

## (undated) DEVICE — MEDI-VAC NON-CONDUCTIVE SUCTION TUBING: Brand: CARDINAL HEALTH

## (undated) DEVICE — PROXIMATE RELOADABLE LINEAR CUTTER WITH SAFETY LOCK-OUT.  55MM LINEAR CUTTER.: Brand: PROXIMATE

## (undated) DEVICE — Device: Brand: DUAL NARE NASAL CANNULAE FEMALE LUER CON 7FT O2 TUBE

## (undated) DEVICE — KIT CLEAN ENDOKIT 1.1OZ GOWNX2

## (undated) DEVICE — THREADED CLEAR CANNULA WITH OBTURATOR 7MM X 75MM

## (undated) DEVICE — SOL  .9 3000ML

## (undated) DEVICE — SNARE ENDOSCOPIC 10MM ROUND

## (undated) DEVICE — COVER SGL STRL LGHT HNDL BLU

## (undated) DEVICE — EXPRESSEW III SUTURE NEEDLE FOR USE WITH EXPRESSEW II OR III SUTURE PASSER: Brand: EXPRESSEW

## (undated) DEVICE — PROXIMATE LINEAR CUTTER RELOAD (STNADARD) , BLUE, 55MM: Brand: PROXIMATE

## (undated) DEVICE — 3 ML SYRINGE LUER-LOCK TIP: Brand: MONOJECT

## (undated) DEVICE — SNARE OPTMZ PLPCTM TRP

## (undated) DEVICE — SUT SILK 3-0 SA64H

## (undated) DEVICE — SHOULDER P.A.D II: Brand: DEROYAL

## (undated) DEVICE — DYNACORD

## (undated) DEVICE — ENCORE® LATEX ACCLAIM SIZE 8, STERILE LATEX POWDER-FREE SURGICAL GLOVE: Brand: ENCORE

## (undated) DEVICE — JELLY LUBRICANT SURGILUBE 2OZ

## (undated) DEVICE — THREADED CLEAR CANNULA WITH OBTURATOR 5.5MM X 75MM

## (undated) DEVICE — SUT PDS II 0 TP-1 Z991G

## (undated) DEVICE — DISSECTOR ESCP COOLCUT 4MM CRV

## (undated) DEVICE — MEGADYNE E-Z CLEAN BLADE 2.75"

## (undated) DEVICE — 3M™ MEDITPORE™ SOFT CLOTH TAPE 6 IN X 10 YD 12 ROLLS/CASE 2966: Brand: 3M™ MEDIPORE™

## (undated) DEVICE — POLAR CARE CUBE COOLING UNIT

## (undated) DEVICE — CONTAINER GENT-L-KARE 4OZ GRAY

## (undated) DEVICE — KIT SRG NYL ROPE S HK TRC

## (undated) DEVICE — FORCEPS BX L240CM DIA2.4MM L NDL RAD JAW 4

## (undated) DEVICE — MEDI-VAC NON-CONDUCTIVE SUCTION TUBING 6MM X 1.8M (6FT.) L: Brand: CARDINAL HEALTH

## (undated) DEVICE — 60 ML SYRINGE LUER-LOCK TIP: Brand: MONOJECT

## (undated) DEVICE — FORCEP RADIAL JAW 4

## (undated) DEVICE — 3M™ STERI-DRAPE™ INSTRUMENT POUCH 1018: Brand: STERI-DRAPE™

## (undated) DEVICE — ABDOMINAL BINDER: Brand: DEROYAL

## (undated) NOTE — MR AVS SNAPSHOT
[address]  [phone]  [phone]               Thank you for choosing us for your health care visit with Yusef Rich PT.   We are glad to serve you and happy to provide you wit responsible for giving you a prescription for oral medication which you would fill at your local pharmacy. Please follow your doctor's instructions when taking oral sedation.   If you will be taking oral sedation, you must bring a  who will drive you

## (undated) NOTE — LETTER
5/31/2018              [address]        [place]         Dear [place],    This letter is to inform you that our office has made several attempts to reach you by phone without success.   We were attempting to co

## (undated) NOTE — MR AVS SNAPSHOT
[facility]  [address], [address] [facility]  [address] [facility] [phone]               Thank you for choosing us for your health care visit with Yue Austin MD.  We are glad to serve you and happy to provide you with this summary of your Comment:  sacrococcygeal joint and coccygeal joint steroid injections under fluoroscopy      Referral Orders      Normal Orders This Visit    PHYSICAL THERAPY - INTERNAL [[id number] CUSTOM]  Order #:  [id number]         Note to Managed Care Patients:  This is SPECIALTY (OTHER) - EXTERNAL [634205 CPT(R)]  Order #:  [id number]         **REFERRAL REQUEST**    Your physician has referred you to a specialist.  Your physician or the clinic staff will provide you with the phone number you should call to schedule your As of October 6th 2014, the Drug Enforcement Agency [facility]) is reclassifying all hydrocodone combination medications from Schedule III to Schedule II. This includes medications such as Norco, Vicodin, Lortab, Zohydro, and Vicoprofen.     What this means for y Allergies as of Apr 10, 2017     Fish-Derived Products Unknown    Pollen     Seasonal                 Today's Vital Signs     BP Pulse Height Weight BMI    144/86 mmHg 80 59\" 136 lb 27.45 kg/m2         Current Medications          This list is accurate as

## (undated) NOTE — LETTER
1/9/2018              [address]        [place]         Dear [place],      It was a pleasure to see you at our [place] , [address], [address] [facility] office.   Your mammogram is normal. Plan for repeat in 1

## (undated) NOTE — LETTER
08/26/21        [address] [facility]  [address]      Dear [place],    [facility] records indicate that you have outstanding lab work and or testing that was ordered for you and has not yet been completed:  Orders Placed This Encount

## (undated) NOTE — Clinical Note
[facility] SURGERY SCHEDULING FORM   [address] [place] [address] [facility]   [phone] (scheduling phone) [phone] (scheduling fax)     PATIENT INFORMATION   Patient Name:    [place]   :     Completed by:    Lionel Ochoa      Date:    4/12/2017    [facility] will follow its Pre-Admission Assessment and Screening Policy for pre-admission testing.   Physicians that require   additional testing must order this directly and have results faxed to [facility] at 3

## (undated) NOTE — ED AVS SNAPSHOT
[facility] Emergency Department  [place] [address]   [address] [place] [address]  Phone:  [phone]  Fax:  [phone] [facility]   MRN: [id number]    Department:  [facility] Emergency Department   Date of Visit:  7/8/20 and Class Registration line at [phone] or find a doctor online by visiting [email or web address].    IF THERE IS ANY CHANGE OR WORSENING OF YOUR CONDITION, CALL YOUR PRIMARY CARE PHYSICIAN AT ONCE OR RETURN IMMEDIATELY TO [address] [place] [address].     If

## (undated) NOTE — MR AVS SNAPSHOT
[facility] [id number]               Thank you for choosing us for your health care visit with Kumar Dickens MD.  We are glad to serve you and happy to provide you with this summary of your visit.   Please h [email or web address]

## (undated) NOTE — LETTER
[facility]  Administration of Anesthesia  1.  Adis Whiteside, or _________________________________ acting on her behalf, (Patient) (Dependent/Representative) request to receive anesthesia for my pending procedure/operation/treat infections, high spinal block, spinal bleeding, seizure, cardiac arrest and death. 7. AWARENESS: I understand that it is possible (but unlikely) to have explicit memory of events from the operating room while under general anesthesia.   8. ELECTROCONVULSIV unconscious pt /Relationship    My signature below affirms that prior to the time of the procedure, I have explained to the patient and/or his/her guardian, the risks and benefits of undergoing anesthesia, as well as any reasonable alternatives.     _______

## (undated) NOTE — Clinical Note
Spoke with pt today for TCM--sent TE to office staff for appt clarification and f/u, as FYI/TCM protocol. CCM referral placed, thank you.

## (undated) NOTE — MR AVS SNAPSHOT
[facility]  [address], [address] [place] [address]  [address] [facility] [phone]               Thank you for choosing us for your health care visit with Jemima King MD.  We are glad to serve you and happy to provide you with this summary of your Assoc Dx:  Coccydynia [M53.3], Pelvic pain [R10.2]                 Scheduling Instructions     Wednesday June 07, 2017     Imaging:  MRI PELVIS (SOFT TISSUE) (W+WO) (CPT=72197)    Instructions:  IMPORTANT    Your physician has ordered a radiology test sesar · Allow 2-3 business days for refills; controlled substances may take longer. · Contact our office 5 days prior to running out of medication or submit request through the “request refill” option in your [employer] account.   · Refills are not addressed on wee Glucose Blood Strp   Test blood sugar twice daily. Commonly known as:  TAMICA CONTOUR TEST           Losartan Potassium 50 MG Tabs   TAKE 1 TABLET (50 MG TOTAL) BY MOUTH ONCE DAILY.    Commonly known as:  COZAAR           Meloxicam 15 MG Tabs   TAKE 1 TAB Visit [facility] online at  [email or web address]

## (undated) NOTE — Clinical Note
Sent as FYKAILEY--spoke with daughter Rice Parents for TCM--declines full TCM assessment, declines TCM/HFU, as she thinks pt sees another [place] provider, but does not know who--resolving episode.  Thank you

## (undated) NOTE — MR AVS SNAPSHOT
[address]  [phone]  [phone]               Thank you for choosing us for your health care visit with Maurilio Melendez PT.   We are glad to serve you and happy to provide you wit May 22, 2017  4:45 PM   [facility] Pelvic Floor Visit with Robinson Chavis PT, [facility] OF THE [facility] PELVIC  [address] [facility] ([address] [facility])    [address]  [address] [phone]

## (undated) NOTE — MR AVS SNAPSHOT
[address]  [phone]  [phone]               Thank you for choosing us for your health care visit with Jas Caldwell PT.   We are glad to serve you and happy to provide you wit May 31, 2017  4:45 PM   [facility] Pelvic Floor Visit with Maile Gillespie, PT, [facility] OF THE [facility] PELVIC  [address] [facility] ([address] [place] [address])    [address]  [place] [phone]

## (undated) NOTE — LETTER
09/02/20        [address] [facility]  [address]      Dear [place],    [facility] records indicate that you have outstanding lab work and or testing that was ordered for you and has not yet been completed:  Orders Placed This Encount

## (undated) NOTE — MR AVS SNAPSHOT
[address]  [phone]  [phone]               Thank you for choosing us for your health care visit with Robinson Comer PT.   We are glad to serve you and happy to provide you wit Jun 13, 2017  4:30 PM   [facility] Pelvic Floor Visit with Melva Sanchez PT, [facility] OF THE [facility] PELVIC  [address] [facility] ([facility])    [address]  [facility] [address]   [phone]

## (undated) NOTE — LETTER
[facility], [place]  Authorization for Invasive Procedures  1.  I hereby authorize Dr. Romeo Gardner*** , my physician and whomever may be designated as the doctor's assistant, to perform the following operation and/or procedure: Sharlette Nyhan performed for the purposes of advancing medicine, science, and/or education, provided my identity is not revealed. If the procedure has been videotaped, the physician/surgeon will obtain the original videotape.  The hospital will not be responsible for stor My signature below affirms that prior to the time of the procedure, I have explained to the patient and/or her legal representative, the risks and benefits involved in the proposed treatment and any reasonable alternative to the proposed treatment.  I have

## (undated) NOTE — MR AVS SNAPSHOT
[address]  [phone]  [phone]               Thank you for choosing us for your health care visit with Giovanni Holden PT.   We are glad to serve you and happy to provide you wit [facility] Pelvic Floor Visit with Ector Domínguez PT, [facility] OF THE [facility] PELVIC RM [address] ([address])    [address]  [address]  [facility] [address]   [phone]            Jun 21, 2017  4:30 PM   E

## (undated) NOTE — LETTER
APDEMETRIAT ANESTHESIOLOGISTS  Administration of Anesthesia  1.  Lanier Gowers, or _________________________________ acting on her behalf, (Patient) (Dependent/Representative) request to receive anesthesia for my pending procedure/operation/treat infections, high spinal block, spinal bleeding, seizure, cardiac arrest and death. 7. AWARENESS: I understand that it is possible (but unlikely) to have explicit memory of events from the operating room while under general anesthesia.   8. ELECTROCONVULSIV unconscious pt /Relationship    My signature below affirms that prior to the time of the procedure, I have explained to the patient and/or his/her guardian, the risks and benefits of undergoing anesthesia, as well as any reasonable alternatives.     _______

## (undated) NOTE — LETTER
[address] [facility] [address] [facility], [place]  Authorization for Surgical Operation and Procedure                                                                                           I hereby authorize Tia Boateng MD, my physician and his/her assistants (if applicable), which may include medical students, residents, and/or fellows, to perform the following surgical operation/ procedure and administer such anesthesia as may be determined necessary by my physician: Operation/Procedure name (s) COLONOSCOPY on [address] [place] [address] Road   2. I recognize that during the surgical operation/procedure, unforeseen conditions may necessitate additional or different procedures than those listed above. I, therefore, further authorize and request that the above-named surgeon, assistants, or designees perform such procedures as are, in their judgment, necessary and desirable. 3.   My surgeon/physician has discussed prior to my surgery the potential benefits, risks and side effects of this procedure; the likelihood of achieving goals; and potential problems that might occur during recuperation. They also discussed reasonable alternatives to the procedure, including risks, benefits, and side effects related to the alternatives and risks related to not receiving this procedure. I have had all my questions answered and I acknowledge that no guarantee has been made as to the result that may be obtained. 4.   Should the need arise during my operation/procedure, which includes change of level of care prior to discharge, I also consent to the administration of blood and/or blood products. Further, I understand that despite careful testing and screening of blood or blood products by collecting agencies, I may still be subject to ill effects as a result of receiving a blood transfusion and/or blood products.   The following are some, but not all, of the potential risks that can occur: fever and allergic reactions, hemolytic reactions, transmission of diseases such as Hepatitis, AIDS and Cytomegalovirus (CMV) and fluid overload. In the event that I wish to have an autologous transfusion of my own blood, or a directed donor transfusion, I will discuss this with my physician. Check only if Refusing Blood or Blood Products  I understand refusal of blood or blood products as deemed necessary by my physician may have serious consequences to my condition to include possible death. I hereby assume responsibility for my refusal and release the hospital, its personnel, and my physicians from any responsibility for the consequences of my refusal.    o  Refuse   5. I authorize the use of any specimen, organs, tissues, body parts or foreign objects that may be removed from my body during the operation/procedure for diagnosis, research or teaching purposes and their subsequent disposal by hospital authorities. I also authorize the release of specimen test results and/or written reports to my treating physician on the hospital medical staff or other referring or consulting physicians involved in my care, at the discretion of the Pathologist or my treating physician. 6.   I consent to the photographing or videotaping of the operations or procedures to be performed, including appropriate portions of my body for medical, scientific, or educational purposes, provided my identity is not revealed by the pictures or by descriptive texts accompanying them. If the procedure has been photographed/videotaped, the surgeon will obtain the original picture, image, videotape or CD. The hospital will not be responsible for storage, release or maintenance of the picture, image, tape or CD.    7.   I consent to the presence of a  or observers in the operating room as deemed necessary by my physician or their designees.     8.   I recognize that in the event my procedure results in extended X-Ray/fluoroscopy time, I may develop a skin reaction. 9. If I have a Do Not Attempt Resuscitation (DNAR) order in place, that status will be suspended while in the operating room, procedural suite, and during the recovery period unless otherwise explicitly stated by me (or a person authorized to consent on my behalf). The surgeon or my attending physician will determine when the applicable recovery period ends for purposes of reinstating the DNAR order. 10. Patients having a sterilization procedure: I understand that if the procedure is successful the results will be permanent and it will therefore be impossible for me to inseminate, conceive, or bear children. I also understand that the procedure is intended to result in sterility, although the result has not been guaranteed. 11. I acknowledge that my physician has explained sedation/analgesia administration to me including the risk and benefits I consent to the administration of sedation/analgesia as may be necessary or desirable in the judgment of my physician. I CERTIFY THAT I HAVE READ AND FULLY UNDERSTAND THE ABOVE CONSENT TO OPERATION and/or OTHER PROCEDURE.     _________________________________________ _________________________________     ___________________________________  Signature of Patient     Signature of Responsible Person                   Printed Name of Responsible Person                              _________________________________________ ______________________________        ___________________________________  Signature of Witness         Date  Time         Relationship to Patient    STATEMENT OF PHYSICIAN My signature below affirms that prior to the time of the procedure; I have explained to the patient and/or his/her legal representative, the risks and benefits involved in the proposed treatment and any reasonable alternative to the proposed treatment.  I have also explained the risks and benefits involved in refusal of the proposed treatment and alternatives to the proposed treatment and have answered the patient's questions.  If I have a significant financial interest in a co-management agreement or a significant financial interest in any product or implant, or other significant relationship used in this procedure/surgery, I have disclosed this and had a discussion with my patient.     _______________________________________________________________ _____________________________  (Signature of Physician)                                                                                         (Date)                                   (Time)  Patient Name: [place]    : 1960   Printed: 10/19/2023      Medical Record #: [id number]                                              Page 1 of 1

## (undated) NOTE — LETTER
4/23/2018    [address]        [place]            Dear Noel Neal,      Our records indicate that you are due for an appointment for a Colonoscopy on or about May 2018 with Devi Woodruff MD.    Janae

## (undated) NOTE — LETTER
8/29/2018              [address]        [facility]         Dear [place],    I wanted to get back to you with your colonoscopy results. You had colon polyps removed which were benign.   I would advise anahi key

## (undated) NOTE — LETTER
October 10, 2023    [address]      Dear [place]: The following are the results of your recent tests. Please review the list of test results. Your result is the value on the left; we have also supplied the range of normal (low and high) values. Results for orders placed or performed in visit on 41/58/92   Comp Metabolic Panel (14)   Result Value Ref Range    Glucose 103 (H) 70 - 99 mg/dL    Sodium 140 136 - 145 mmol/L    Potassium 3.8 3.5 - 5.1 mmol/L    Chloride 107 98 - 112 mmol/L    CO2 27.0 21.0 - 32.0 mmol/L    Anion Gap 6 0 - 18 mmol/L    BUN 7 7 - 18 mg/dL    Creatinine 0.72 0.55 - 1.02 mg/dL    Calcium, Total 9.0 8.5 - 10.1 mg/dL    Calculated Osmolality 288 275 - 295 mOsm/kg    eGFR-Cr 93 >=60 mL/min/1.73m2    AST 16 15 - 37 U/L    ALT 19 13 - 56 U/L    Alkaline Phosphatase 48 (L) 50 - 130 U/L    Bilirubin, Total 0.6 0.1 - 2.0 mg/dL    Total Protein 7.3 6.4 - 8.2 g/dL    Albumin 3.9 3.4 - 5.0 g/dL    Globulin  3.4 2.8 - 4.4 g/dL    A/G Ratio 1.1 1.0 - 2.0    Patient Fasting for CMP? Yes    Hemoglobin A1C   Result Value Ref Range    HgbA1C 6.3 (H) <5.7 %    Estimated Average Glucose 134 (H) 68 - 126 mg/dL   Lipid Panel   Result Value Ref Range    Cholesterol, Total 194 <200 mg/dL    HDL Cholesterol 89 (H) 40 - 59 mg/dL    Triglycerides 91 30 - 149 mg/dL    LDL Cholesterol 89 <100 mg/dL    VLDL 15 0 - 30 mg/dL    Non HDL Chol 105 <130 mg/dL    Patient Fasting for Lipid?  Yes    TSH W Reflex To Free T4   Result Value Ref Range    TSH 1.020 0.358 - 3.740 mIU/mL   Microalb/Creat Ratio, Random Urine   Result Value Ref Range    Microalbumin, Urine 1.13 mg/dL    Creatinine Ur Random 213.00 mg/dL    Malb/Cre Calc 5.3 <=30.0 ug/mg   CBC W/ DIFFERENTIAL   Result Value Ref Range    WBC 3.7 (L) 4.0 - 11.0 x10(3) uL    RBC 4.19 3.80 - 5.30 x10(6)uL    HGB 12.6 12.0 - 16.0 g/dL    HCT 36.7 35.0 - 48.0 %    .0 150.0 - 450.0 10(3)uL    MCV 87.6 80.0 - 100.0 fL MCH 30.1 26.0 - 34.0 pg    MCHC 34.3 31.0 - 37.0 g/dL    RDW 13.2 %    Neutrophil Absolute Prelim 1.94 1.50 - 7.70 x10 (3) uL    Neutrophil Absolute 1.94 1.50 - 7.70 x10(3) uL    Lymphocyte Absolute 1.31 1.00 - 4.00 x10(3) uL    Monocyte Absolute 0.33 0.10 - 1.00 x10(3) uL    Eosinophil Absolute 0.07 0.00 - 0.70 x10(3) uL    Basophil Absolute 0.06 0.00 - 0.20 x10(3) uL    Immature Granulocyte Absolute 0.01 0.00 - 1.00 x10(3) uL    Neutrophil % 52.1 %    Lymphocyte % 35.2 %    Monocyte % 8.9 %    Eosinophil % 1.9 %    Basophil % 1.6 %    Immature Granulocyte % 0.3 %

## (undated) NOTE — LETTER
AUTHORIZATION FOR SURGICAL OPERATION OR OTHER PROCEDURE    1.  I hereby authorize Dr. Rona Limon, and [facility] staff assigned to my case to perform the following operation and/or procedure at the [facility]:    _______________________ Time:  ________ A. M.  P.M.        Patient Name:  ______________________________________________________  (please print)      Patient signature:  ___________________________________________________             Relationship to Patient:

## (undated) NOTE — LETTER
[address]  Authorization for Surgical Operation and Procedure                                                                                           1. I hereby authorize * Surgery not found *, my physician and his/her assistants (if applicable), which may include medical students, residents, and/or fellows, to perform the following surgical operation/ procedure and administer such anesthesia as may be determined necessary by my physician: Operation/Procedure name (s)  on [facility]   2. I recognize that during the surgical operation/procedure, unforeseen conditions may necessitate additional or different procedures than those listed above. I, therefore, further authorize and request that the above-named surgeon, assistants, or designees perform such procedures as are, in their judgment, necessary and desirable. 3.   My surgeon/physician has discussed prior to my surgery the potential benefits, risks and side effects of this procedure; the likelihood of achieving goals; and potential problems that might occur during recuperation. They also discussed reasonable alternatives to the procedure, including risks, benefits, and side effects related to the alternatives and risks related to not receiving this procedure. I have had all my questions answered and I acknowledge that no guarantee has been made as to the result that may be obtained. 4.   Should the need arise during my operation/procedure, which includes change of level of care prior to discharge, I also consent to the administration of blood and/or blood products. Further, I understand that despite careful testing and screening of blood or blood products by collecting agencies, I may still be subject to ill effects as a result of receiving a blood transfusion and/or blood products.   The following are some, but not all, of the potential risks that can occur: fever and allergic reactions, hemolytic reactions, transmission of diseases such as Hepatitis, AIDS and Cytomegalovirus (CMV) and fluid overload. In the event that I wish to have an autologous transfusion of my own blood, or a directed donor transfusion, I will discuss this with my physician. Check only if Refusing Blood or Blood Products  I understand refusal of blood or blood products as deemed necessary by my physician may have serious consequences to my condition to include possible death. I hereby assume responsibility for my refusal and release the hospital, its personnel, and my physicians from any responsibility for the consequences of my refusal.    o  Refuse   5. I authorize the use of any specimen, organs, tissues, body parts or foreign objects that may be removed from my body during the operation/procedure for diagnosis, research or teaching purposes and their subsequent disposal by hospital authorities. I also authorize the release of specimen test results and/or written reports to my treating physician on the hospital medical staff or other referring or consulting physicians involved in my care, at the discretion of the Pathologist or my treating physician. 6.   I consent to the photographing or videotaping of the operations or procedures to be performed, including appropriate portions of my body for medical, scientific, or educational purposes, provided my identity is not revealed by the pictures or by descriptive texts accompanying them. If the procedure has been photographed/videotaped, the surgeon will obtain the original picture, image, videotape or CD. The hospital will not be responsible for storage, release or maintenance of the picture, image, tape or CD.    7.   I consent to the presence of a  or observers in the operating room as deemed necessary by my physician or their designees.     8.   I recognize that in the event my procedure results in extended X-Ray/fluoroscopy time, I may develop a skin reaction. 9. If I have a Do Not Attempt Resuscitation (DNAR) order in place, that status will be suspended while in the operating room, procedural suite, and during the recovery period unless otherwise explicitly stated by me (or a person authorized to consent on my behalf). The surgeon or my attending physician will determine when the applicable recovery period ends for purposes of reinstating the DNAR order. 10. Patients having a sterilization procedure: I understand that if the procedure is successful the results will be permanent and it will therefore be impossible for me to inseminate, conceive, or bear children. I also understand that the procedure is intended to result in sterility, although the result has not been guaranteed. 11. I acknowledge that my physician has explained sedation/analgesia administration to me including the risk and benefits I consent to the administration of sedation/analgesia as may be necessary or desirable in the judgment of my physician. I CERTIFY THAT I HAVE READ AND FULLY UNDERSTAND THE ABOVE CONSENT TO OPERATION and/or OTHER PROCEDURE.     _________________________________________ _________________________________     ___________________________________  Signature of Patient     Signature of Responsible Person                   Printed Name of Responsible Person                              _________________________________________ ______________________________        ___________________________________  Signature of Witness         Date  Time         Relationship to Patient    STATEMENT OF PHYSICIAN My signature below affirms that prior to the time of the procedure; I have explained to the patient and/or his/her legal representative, the risks and benefits involved in the proposed treatment and any reasonable alternative to the proposed treatment.  I have also explained the risks and benefits involved in refusal of the proposed treatment and alternatives to the proposed treatment and have answered the patient's questions.  If I have a significant financial interest in a co-management agreement or a significant financial interest in any product or implant, or other significant relationship used in this procedure/surgery, I have disclosed this and had a discussion with my patient.     _______________________________________________________________ _____________________________  (Signature of Physician)                                                                                         (Date)                                   (Time)  Patient Name: [place]    : 1960   Printed: 2023      Medical Record #: [id number]                                              Page 1 of 1

## (undated) NOTE — MR AVS SNAPSHOT
[address] [place] [address]  [address]  [phone]               Thank you for choosing us for your health care visit with Tracy Gonzalez MD.  We are glad to serve you and happy to provide you with this summary of yo Lipid Panel    Complete by:  Jun 02, 2017 (Approximate)    Assoc Dx:  Hyperlipidemia LDL goal <100 [E78.5]           Comp Metabolic Panel (14)    Complete by:  Jun 02, 2017 (Approximate)    Assoc Dx:  Hyperlipidemia LDL goal <100 [E78.5]           Yvonneo Fish-Derived Products Unknown    Pollen     Seasonal                 Today's Vital Signs     BP Pulse Temp Weight          143/92 mmHg 64 98.7 °F (37.1 °C) (Oral) 134 lb 3.2 oz (60.873 kg)           Current Medications          This list is accurate as of You can get these medications from any pharmacy     Bring a paper prescription for each of these medications    - TraMADol HCl 50 MG Tabs  - Zolpidem Tartrate 10 MG Tabs            Health Goals discussed Today        Last Edited       Therapy Goals 4/27/20

## (undated) NOTE — MR AVS SNAPSHOT
[facility]  [address] [place] [address], [address] [place] [address] [place]               Thank you for choosing us for your health care visit with Pushpa Taylor MD.  We are glad to serve you and happy to provide you with this summary Assoc Dx:  Sacral pain [M53.3], Chronic midline low back pain without sciatica [M54.5, G89.29]          Reason for Today's Visit     Fibromyalgia Syndrome     Hip Pain     Back Pain           Medical Issues Discussed Today     Sacral pain    -  Primary TAKE 1 TABLET BY MOUTH NIGHTLY. Commonly known as:  ZOCOR           TraMADol HCl 50 MG Tabs   Take 1 tablet (50 mg total) by mouth every 8 (eight) hours as needed for Pain. What changed:  See the new instructions.    Commonly known as:  Mynor Serna Make half your plate fruits and vegetables Highly refined, white starches including white bread, rice and pasta   Eat plenty of protein, keep the fat content low Sugars:  sodas and sports drinks, candies and desserts   Eat plenty of low-fat dairy products

## (undated) NOTE — LETTER
July 3, 2018     [facility]      Dear [place]:    Below are the results from your recent visit:  Blood test showed low levels of vitamin D.  A prescription was sent to his pharmacy to take one capsule or t